# Patient Record
Sex: FEMALE | Race: BLACK OR AFRICAN AMERICAN | NOT HISPANIC OR LATINO | Employment: UNEMPLOYED | URBAN - METROPOLITAN AREA
[De-identification: names, ages, dates, MRNs, and addresses within clinical notes are randomized per-mention and may not be internally consistent; named-entity substitution may affect disease eponyms.]

---

## 2018-10-14 ENCOUNTER — HOSPITAL ENCOUNTER (EMERGENCY)
Facility: OTHER | Age: 38
Discharge: LEFT BEFORE DISCHARGE | End: 2018-10-14
Attending: EMERGENCY MEDICINE
Payer: MEDICAID

## 2018-10-14 VITALS
DIASTOLIC BLOOD PRESSURE: 71 MMHG | TEMPERATURE: 99 F | HEIGHT: 65 IN | SYSTOLIC BLOOD PRESSURE: 100 MMHG | RESPIRATION RATE: 18 BRPM | HEART RATE: 108 BPM | OXYGEN SATURATION: 99 % | WEIGHT: 168 LBS | BODY MASS INDEX: 27.99 KG/M2

## 2018-10-14 DIAGNOSIS — K92.1 BLOODY STOOL: ICD-10-CM

## 2018-10-14 DIAGNOSIS — R07.9 CHEST PAIN: Primary | ICD-10-CM

## 2018-10-14 DIAGNOSIS — R06.02 SHORTNESS OF BREATH: ICD-10-CM

## 2018-10-14 LAB
B-HCG UR QL: NEGATIVE
CTP QC/QA: YES

## 2018-10-14 PROCEDURE — 93010 ELECTROCARDIOGRAM REPORT: CPT | Mod: ,,, | Performed by: INTERNAL MEDICINE

## 2018-10-14 PROCEDURE — 93005 ELECTROCARDIOGRAM TRACING: CPT

## 2018-10-14 PROCEDURE — 81025 URINE PREGNANCY TEST: CPT | Performed by: EMERGENCY MEDICINE

## 2018-10-14 PROCEDURE — 99283 EMERGENCY DEPT VISIT LOW MDM: CPT | Mod: 25

## 2018-10-14 NOTE — ED TRIAGE NOTES
"+ intermittent mid sternal reproducible chest pains described as "pressure" rated 5/10 on pain scale x three years. Pt states pain is radiating to left arm and under left breast. + increased OSb upon rest and exertion. + intermittent dizziness reported. Denies N/V/D, fever or chills. Denies abdominal pains at this time.   "

## 2018-10-14 NOTE — ED NOTES
"Two patient identifiers have been checked and are correct.      Appearance: Pt awake, alert & oriented to person, place & time. Pt in no acute distress at present time. Pt is clean and well groomed with clothes appropriately fastened.   Skin: Skin warm, dry & intact. Color consistent with ethnicity. Mucous membranes moist. No breakdown or brusing noted.   Musculoskeletal: Patient moving all extremities well, no obvious swelling or deformities noted.   Respiratory: Respirations spontaneous, even, and non-labored. Visible chest rise noted. Airway is open and patent. No accessory muscle use noted.   Neurologic: Sensation is intact. Speech is clear and appropriate. Eyes open spontaneously, behavior appropriate to situation, follows commands, facial expression symmetrical, bilateral hand grasp equal and even, purposeful motor response noted.  Cardiac: All peripheral pulses present. No Bilateral lower extremity edema. Cap refill is <3 seconds. + mid sternal reproducible chest pains described as "pressure" rated 6/10 on pains.   Abdomen: Abdomen soft, non-tender to palpation.   : Pt reports no dysuria or hematuria.           "
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
## 2018-10-14 NOTE — ED NOTES
GI cocktail tubed to pharmacy. Unable to return to [facility] internal bin.

## 2020-04-27 ENCOUNTER — HOSPITAL ENCOUNTER (EMERGENCY)
Facility: HOSPITAL | Age: 40
Discharge: HOME | End: 2020-04-27
Attending: INTERNAL MEDICINE
Payer: MEDICAID

## 2020-04-27 VITALS
WEIGHT: 170 LBS | SYSTOLIC BLOOD PRESSURE: 105 MMHG | HEIGHT: 65 IN | OXYGEN SATURATION: 99 % | DIASTOLIC BLOOD PRESSURE: 69 MMHG | TEMPERATURE: 99 F | HEART RATE: 89 BPM | RESPIRATION RATE: 18 BRPM | BODY MASS INDEX: 28.32 KG/M2

## 2020-04-27 DIAGNOSIS — O26.892 ABDOMINAL PAIN DURING PREGNANCY IN SECOND TRIMESTER: Primary | ICD-10-CM

## 2020-04-27 DIAGNOSIS — R10.9 ABDOMINAL PAIN DURING PREGNANCY IN SECOND TRIMESTER: Primary | ICD-10-CM

## 2020-04-27 DIAGNOSIS — Z3A.11 PREGNANCY WITH 11 COMPLETED WEEKS GESTATION: ICD-10-CM

## 2020-04-27 LAB
ALBUMIN SERPL-MCNC: 2.6 G/DL (ref 3.3–5.5)
ALP SERPL-CCNC: 140 U/L (ref 42–141)
B-HCG UR QL: POSITIVE
B-HCG UR QL: POSITIVE
BILIRUB SERPL-MCNC: 0.6 MG/DL (ref 0.2–1.6)
BILIRUBIN, POC UA: NEGATIVE
BLOOD, POC UA: NEGATIVE
BUN SERPL-MCNC: 9 MG/DL (ref 7–22)
CALCIUM SERPL-MCNC: 8.7 MG/DL (ref 8–10.3)
CHLORIDE SERPL-SCNC: 109 MMOL/L (ref 98–108)
CLARITY, POC UA: ABNORMAL
COLOR, POC UA: YELLOW
CREAT SERPL-MCNC: 0.8 MG/DL (ref 0.6–1.2)
CTP QC/QA: YES
CTP QC/QA: YES
GLUCOSE SERPL-MCNC: 90 MG/DL (ref 73–118)
GLUCOSE, POC UA: NEGATIVE
KETONES, POC UA: NEGATIVE
LEUKOCYTE EST, POC UA: NEGATIVE
NITRITE, POC UA: NEGATIVE
PH UR STRIP: 8.5 [PH]
POC ALT (SGPT): 130 U/L (ref 10–47)
POC AST (SGOT): 76 U/L (ref 11–38)
POC BETA-HCG (QUANT): >2000 IU/L
POC PTINR: 0.9 (ref 0.9–1.2)
POC PTWBT: 11.3 SEC (ref 9.7–14.3)
POC TCO2: 26 MMOL/L (ref 18–33)
POTASSIUM BLD-SCNC: 4 MMOL/L (ref 3.6–5.1)
PROTEIN, POC UA: NEGATIVE
PROTEIN, POC: 6.7 G/DL (ref 6.4–8.1)
SAMPLE: NORMAL
SAMPLE: NORMAL
SODIUM BLD-SCNC: 142 MMOL/L (ref 128–145)
SPECIFIC GRAVITY, POC UA: 1.02
UROBILINOGEN, POC UA: 2 E.U./DL

## 2020-04-27 PROCEDURE — 87491 CHLMYD TRACH DNA AMP PROBE: CPT

## 2020-04-27 PROCEDURE — 99284 EMERGENCY DEPT VISIT MOD MDM: CPT | Mod: 25,ER

## 2020-04-27 PROCEDURE — 85025 COMPLETE CBC W/AUTO DIFF WBC: CPT | Mod: ER

## 2020-04-27 PROCEDURE — 63600175 PHARM REV CODE 636 W HCPCS: Mod: ER | Performed by: NURSE PRACTITIONER

## 2020-04-27 PROCEDURE — 85610 PROTHROMBIN TIME: CPT | Mod: ER

## 2020-04-27 PROCEDURE — 96372 THER/PROPH/DIAG INJ SC/IM: CPT | Mod: ER

## 2020-04-27 PROCEDURE — 80053 COMPREHEN METABOLIC PANEL: CPT | Mod: ER

## 2020-04-27 PROCEDURE — 81025 URINE PREGNANCY TEST: CPT | Mod: ER | Performed by: EMERGENCY MEDICINE

## 2020-04-27 PROCEDURE — 81025 URINE PREGNANCY TEST: CPT | Mod: ER | Performed by: INTERNAL MEDICINE

## 2020-04-27 PROCEDURE — 81003 URINALYSIS AUTO W/O SCOPE: CPT | Mod: ER

## 2020-04-27 PROCEDURE — 63700000 PHARM REV CODE 250 ALT 637 W/O HCPCS: Mod: ER | Performed by: NURSE PRACTITIONER

## 2020-04-27 PROCEDURE — 84702 CHORIONIC GONADOTROPIN TEST: CPT | Mod: ER

## 2020-04-27 RX ORDER — AZITHROMYCIN 250 MG/1
1000 TABLET, FILM COATED ORAL
Status: COMPLETED | OUTPATIENT
Start: 2020-04-27 | End: 2020-04-27

## 2020-04-27 RX ORDER — CEFTRIAXONE 250 MG/1
250 INJECTION, POWDER, FOR SOLUTION INTRAMUSCULAR; INTRAVENOUS
Status: COMPLETED | OUTPATIENT
Start: 2020-04-27 | End: 2020-04-27

## 2020-04-27 RX ADMIN — AZITHROMYCIN MONOHYDRATE 1000 MG: 250 TABLET ORAL at 04:04

## 2020-04-27 RX ADMIN — CEFTRIAXONE SODIUM 250 MG: 250 INJECTION, POWDER, FOR SOLUTION INTRAMUSCULAR; INTRAVENOUS at 04:04
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
## 2020-04-27 NOTE — ED NOTES
Patient tolerated ceftriaxone injection well. After 15 minutes of observation patient was discharged.
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
## 2020-04-28 LAB
C TRACH DNA SPEC QL NAA+PROBE: NOT DETECTED
N GONORRHOEA DNA SPEC QL NAA+PROBE: NOT DETECTED

## 2020-05-08 ENCOUNTER — TELEPHONE (OUTPATIENT)
Dept: OBSTETRICS AND GYNECOLOGY | Facility: CLINIC | Age: 40
End: 2020-05-08

## 2020-05-08 DIAGNOSIS — Z36.3 ENCOUNTER FOR ANTENATAL SCREENING FOR MALFORMATION USING ULTRASOUND: Primary | ICD-10-CM

## 2020-06-11 ENCOUNTER — HOSPITAL ENCOUNTER (EMERGENCY)
Facility: HOSPITAL | Age: 40
Discharge: HOME | End: 2020-06-11
Attending: INTERNAL MEDICINE
Payer: MEDICAID

## 2020-06-11 VITALS
TEMPERATURE: 98 F | OXYGEN SATURATION: 100 % | SYSTOLIC BLOOD PRESSURE: 117 MMHG | DIASTOLIC BLOOD PRESSURE: 72 MMHG | BODY MASS INDEX: 31.65 KG/M2 | WEIGHT: 190 LBS | RESPIRATION RATE: 16 BRPM | HEART RATE: 87 BPM | HEIGHT: 65 IN

## 2020-06-11 DIAGNOSIS — Z34.90 PREGNANCY, UNSPECIFIED GESTATIONAL AGE: ICD-10-CM

## 2020-06-11 DIAGNOSIS — R60.0 BILATERAL LOWER EXTREMITY EDEMA: Primary | ICD-10-CM

## 2020-06-11 PROCEDURE — 99282 EMERGENCY DEPT VISIT SF MDM: CPT | Mod: ER
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
## 2020-06-23 ENCOUNTER — HOSPITAL ENCOUNTER (EMERGENCY)
Facility: OTHER | Age: 40
Discharge: HOME | End: 2020-06-23
Attending: EMERGENCY MEDICINE
Payer: MEDICAID

## 2020-06-23 VITALS
SYSTOLIC BLOOD PRESSURE: 130 MMHG | BODY MASS INDEX: 32.49 KG/M2 | WEIGHT: 195 LBS | DIASTOLIC BLOOD PRESSURE: 73 MMHG | HEIGHT: 65 IN | HEART RATE: 62 BPM | TEMPERATURE: 99 F | OXYGEN SATURATION: 98 % | RESPIRATION RATE: 15 BRPM

## 2020-06-23 DIAGNOSIS — R60.9 SWELLING: ICD-10-CM

## 2020-06-23 DIAGNOSIS — R53.1 WEAKNESS: ICD-10-CM

## 2020-06-23 DIAGNOSIS — R60.0 PERIPHERAL EDEMA: Primary | ICD-10-CM

## 2020-06-23 LAB
ALBUMIN SERPL BCP-MCNC: 3.1 G/DL (ref 3.5–5.2)
ALP SERPL-CCNC: 117 U/L (ref 55–135)
ALT SERPL W/O P-5'-P-CCNC: 19 U/L (ref 10–44)
ANION GAP SERPL CALC-SCNC: 10 MMOL/L (ref 8–16)
ANISOCYTOSIS BLD QL SMEAR: SLIGHT
AST SERPL-CCNC: 36 U/L (ref 10–40)
B-HCG UR QL: NEGATIVE
BASOPHILS # BLD AUTO: ABNORMAL K/UL (ref 0–0.2)
BASOPHILS NFR BLD: 0 % (ref 0–1.9)
BILIRUB SERPL-MCNC: 0.2 MG/DL (ref 0.1–1)
BUN SERPL-MCNC: 9 MG/DL (ref 6–20)
CALCIUM SERPL-MCNC: 9.2 MG/DL (ref 8.7–10.5)
CHLORIDE SERPL-SCNC: 108 MMOL/L (ref 95–110)
CO2 SERPL-SCNC: 22 MMOL/L (ref 23–29)
CREAT SERPL-MCNC: 0.9 MG/DL (ref 0.5–1.4)
CTP QC/QA: YES
DIFFERENTIAL METHOD: ABNORMAL
EOSINOPHIL # BLD AUTO: ABNORMAL K/UL (ref 0–0.5)
EOSINOPHIL NFR BLD: 6 % (ref 0–8)
ERYTHROCYTE [DISTWIDTH] IN BLOOD BY AUTOMATED COUNT: 14.6 % (ref 11.5–14.5)
EST. GFR  (AFRICAN AMERICAN): >60 ML/MIN/1.73 M^2
EST. GFR  (NON AFRICAN AMERICAN): >60 ML/MIN/1.73 M^2
GLUCOSE SERPL-MCNC: 95 MG/DL (ref 70–110)
HCT VFR BLD AUTO: 33.3 % (ref 37–48.5)
HGB BLD-MCNC: 10.5 G/DL (ref 12–16)
HYPOCHROMIA BLD QL SMEAR: ABNORMAL
IMM GRANULOCYTES # BLD AUTO: ABNORMAL K/UL (ref 0–0.04)
IMM GRANULOCYTES NFR BLD AUTO: ABNORMAL % (ref 0–0.5)
LYMPHOCYTES # BLD AUTO: ABNORMAL K/UL (ref 1–4.8)
LYMPHOCYTES NFR BLD: 44 % (ref 18–48)
MCH RBC QN AUTO: 27.9 PG (ref 27–31)
MCHC RBC AUTO-ENTMCNC: 31.5 G/DL (ref 32–36)
MCV RBC AUTO: 89 FL (ref 82–98)
MONOCYTES # BLD AUTO: ABNORMAL K/UL (ref 0.3–1)
MONOCYTES NFR BLD: 5 % (ref 4–15)
NEUTROPHILS NFR BLD: 45 % (ref 38–73)
NRBC BLD-RTO: 0 /100 WBC
PLATELET # BLD AUTO: 422 K/UL (ref 150–350)
PLATELET BLD QL SMEAR: ABNORMAL
PMV BLD AUTO: 8.7 FL (ref 9.2–12.9)
POLYCHROMASIA BLD QL SMEAR: ABNORMAL
POTASSIUM SERPL-SCNC: 4.3 MMOL/L (ref 3.5–5.1)
PROT SERPL-MCNC: 8.4 G/DL (ref 6–8.4)
RBC # BLD AUTO: 3.76 M/UL (ref 4–5.4)
SODIUM SERPL-SCNC: 140 MMOL/L (ref 136–145)
WBC # BLD AUTO: 6.74 K/UL (ref 3.9–12.7)

## 2020-06-23 PROCEDURE — 85027 COMPLETE CBC AUTOMATED: CPT

## 2020-06-23 PROCEDURE — 93010 EKG 12-LEAD: ICD-10-PCS | Mod: ,,, | Performed by: INTERNAL MEDICINE

## 2020-06-23 PROCEDURE — 93005 ELECTROCARDIOGRAM TRACING: CPT

## 2020-06-23 PROCEDURE — 93010 ELECTROCARDIOGRAM REPORT: CPT | Mod: ,,, | Performed by: INTERNAL MEDICINE

## 2020-06-23 PROCEDURE — 99285 EMERGENCY DEPT VISIT HI MDM: CPT | Mod: 25

## 2020-06-23 PROCEDURE — 80053 COMPREHEN METABOLIC PANEL: CPT

## 2020-06-23 PROCEDURE — 81025 URINE PREGNANCY TEST: CPT | Performed by: EMERGENCY MEDICINE

## 2020-06-23 PROCEDURE — 85007 BL SMEAR W/DIFF WBC COUNT: CPT
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
## 2020-06-24 NOTE — ED TRIAGE NOTES
"Pt reports to ED with c/o BLE swelling since Thursday, worsening in the last 2 days. Pt reports darker color change in the past 2 days. Pt states ambulation is painful and her feet are "tight." Pt rates pain 10/10.     Pt currently taking methadone for heroin detox. Pt began treatment May 26th. Pt reports last heroin use today, PTA.   "

## 2021-04-26 ENCOUNTER — PATIENT MESSAGE (OUTPATIENT)
Dept: RESEARCH | Facility: HOSPITAL | Age: 41
End: 2021-04-26

## 2021-08-15 ENCOUNTER — HOSPITAL ENCOUNTER (EMERGENCY)
Facility: OTHER | Age: 41
Discharge: LEFT BEFORE DISCHARGE | End: 2021-08-15
Payer: MEDICAID

## 2021-08-15 VITALS
OXYGEN SATURATION: 97 % | HEART RATE: 110 BPM | DIASTOLIC BLOOD PRESSURE: 94 MMHG | SYSTOLIC BLOOD PRESSURE: 123 MMHG | RESPIRATION RATE: 18 BRPM | TEMPERATURE: 98 F

## 2021-08-15 LAB
CTP QC/QA: YES
SARS-COV-2 RDRP RESP QL NAA+PROBE: NEGATIVE

## 2021-08-15 PROCEDURE — 99900041 HC LEFT WITHOUT BEING SEEN- EMERGENCY

## 2021-08-15 PROCEDURE — U0002 COVID-19 LAB TEST NON-CDC: HCPCS | Performed by: EMERGENCY MEDICINE

## 2022-09-06 ENCOUNTER — PATIENT MESSAGE (OUTPATIENT)
Dept: OBSTETRICS AND GYNECOLOGY | Facility: CLINIC | Age: 42
End: 2022-09-06
Payer: MEDICAID

## 2024-08-22 ENCOUNTER — HOSPITAL ENCOUNTER (OUTPATIENT)
Facility: OTHER | Age: 44
Discharge: HOME | End: 2024-08-23
Attending: EMERGENCY MEDICINE | Admitting: EMERGENCY MEDICINE
Payer: MEDICAID

## 2024-08-22 DIAGNOSIS — T50.905A ADVERSE DRUG INTERACTION WITH PRESCRIPTION MEDICATION: ICD-10-CM

## 2024-08-22 DIAGNOSIS — R00.2 PALPITATIONS: ICD-10-CM

## 2024-08-22 DIAGNOSIS — R11.2 INTRACTABLE NAUSEA AND VOMITING: Primary | ICD-10-CM

## 2024-08-22 LAB
ALBUMIN SERPL BCP-MCNC: 3.5 G/DL (ref 3.5–5.2)
ALP SERPL-CCNC: 108 U/L (ref 55–135)
ALT SERPL W/O P-5'-P-CCNC: 16 U/L (ref 10–44)
ANION GAP SERPL CALC-SCNC: 12 MMOL/L (ref 8–16)
AST SERPL-CCNC: 27 U/L (ref 10–40)
BACTERIA #/AREA URNS HPF: ABNORMAL /HPF
BASOPHILS # BLD AUTO: 0.04 K/UL (ref 0–0.2)
BASOPHILS NFR BLD: 0.4 % (ref 0–1.9)
BILIRUB SERPL-MCNC: 0.7 MG/DL (ref 0.1–1)
BILIRUB UR QL STRIP: NEGATIVE
BUN SERPL-MCNC: 15 MG/DL (ref 6–20)
CALCIUM SERPL-MCNC: 9.5 MG/DL (ref 8.7–10.5)
CHLORIDE SERPL-SCNC: 104 MMOL/L (ref 95–110)
CLARITY UR: CLEAR
CO2 SERPL-SCNC: 22 MMOL/L (ref 23–29)
COLOR UR: YELLOW
CREAT SERPL-MCNC: 0.9 MG/DL (ref 0.5–1.4)
DIFFERENTIAL METHOD BLD: ABNORMAL
EOSINOPHIL # BLD AUTO: 0 K/UL (ref 0–0.5)
EOSINOPHIL NFR BLD: 0.2 % (ref 0–8)
ERYTHROCYTE [DISTWIDTH] IN BLOOD BY AUTOMATED COUNT: 14.3 % (ref 11.5–14.5)
EST. GFR  (NO RACE VARIABLE): >60 ML/MIN/1.73 M^2
GLUCOSE SERPL-MCNC: 98 MG/DL (ref 70–110)
GLUCOSE UR QL STRIP: NEGATIVE
HCT VFR BLD AUTO: 37.2 % (ref 37–48.5)
HGB BLD-MCNC: 11.9 G/DL (ref 12–16)
HGB UR QL STRIP: ABNORMAL
HYALINE CASTS #/AREA URNS LPF: 0 /LPF
IMM GRANULOCYTES # BLD AUTO: 0.02 K/UL (ref 0–0.04)
IMM GRANULOCYTES NFR BLD AUTO: 0.2 % (ref 0–0.5)
KETONES UR QL STRIP: ABNORMAL
LEUKOCYTE ESTERASE UR QL STRIP: NEGATIVE
LIPASE SERPL-CCNC: 11 U/L (ref 4–60)
LYMPHOCYTES # BLD AUTO: 1.7 K/UL (ref 1–4.8)
LYMPHOCYTES NFR BLD: 18.1 % (ref 18–48)
MCH RBC QN AUTO: 27.2 PG (ref 27–31)
MCHC RBC AUTO-ENTMCNC: 32 G/DL (ref 32–36)
MCV RBC AUTO: 85 FL (ref 82–98)
MICROSCOPIC COMMENT: ABNORMAL
MONOCYTES # BLD AUTO: 0.5 K/UL (ref 0.3–1)
MONOCYTES NFR BLD: 5.3 % (ref 4–15)
NEUTROPHILS # BLD AUTO: 7.1 K/UL (ref 1.8–7.7)
NEUTROPHILS NFR BLD: 75.8 % (ref 38–73)
NITRITE UR QL STRIP: NEGATIVE
NRBC BLD-RTO: 0 /100 WBC
PH UR STRIP: 6 [PH] (ref 5–8)
PLATELET # BLD AUTO: 349 K/UL (ref 150–450)
PMV BLD AUTO: 8.9 FL (ref 9.2–12.9)
POTASSIUM SERPL-SCNC: 4 MMOL/L (ref 3.5–5.1)
PROT SERPL-MCNC: 9.4 G/DL (ref 6–8.4)
PROT UR QL STRIP: ABNORMAL
RBC # BLD AUTO: 4.38 M/UL (ref 4–5.4)
RBC #/AREA URNS HPF: 6 /HPF (ref 0–4)
SODIUM SERPL-SCNC: 138 MMOL/L (ref 136–145)
SP GR UR STRIP: >1.03 (ref 1–1.03)
SQUAMOUS #/AREA URNS HPF: 4 /HPF
TROPONIN I SERPL DL<=0.01 NG/ML-MCNC: 0.01 NG/ML (ref 0–0.03)
URN SPEC COLLECT METH UR: ABNORMAL
UROBILINOGEN UR STRIP-ACNC: ABNORMAL EU/DL
WBC # BLD AUTO: 9.36 K/UL (ref 3.9–12.7)
WBC #/AREA URNS HPF: 3 /HPF (ref 0–5)

## 2024-08-22 PROCEDURE — 63600175 PHARM REV CODE 636 W HCPCS: Performed by: NURSE PRACTITIONER

## 2024-08-22 PROCEDURE — 80053 COMPREHEN METABOLIC PANEL: CPT | Performed by: NURSE PRACTITIONER

## 2024-08-22 PROCEDURE — 36415 COLL VENOUS BLD VENIPUNCTURE: CPT | Performed by: NURSE PRACTITIONER

## 2024-08-22 PROCEDURE — 93005 ELECTROCARDIOGRAM TRACING: CPT

## 2024-08-22 PROCEDURE — 84484 ASSAY OF TROPONIN QUANT: CPT | Performed by: NURSE PRACTITIONER

## 2024-08-22 PROCEDURE — 96374 THER/PROPH/DIAG INJ IV PUSH: CPT

## 2024-08-22 PROCEDURE — 81000 URINALYSIS NONAUTO W/SCOPE: CPT | Performed by: NURSE PRACTITIONER

## 2024-08-22 PROCEDURE — 96361 HYDRATE IV INFUSION ADD-ON: CPT

## 2024-08-22 PROCEDURE — 85025 COMPLETE CBC W/AUTO DIFF WBC: CPT | Performed by: NURSE PRACTITIONER

## 2024-08-22 PROCEDURE — 83690 ASSAY OF LIPASE: CPT | Performed by: NURSE PRACTITIONER

## 2024-08-22 PROCEDURE — 99285 EMERGENCY DEPT VISIT HI MDM: CPT | Mod: 25

## 2024-08-22 PROCEDURE — 93010 ELECTROCARDIOGRAM REPORT: CPT | Mod: ,,, | Performed by: INTERNAL MEDICINE

## 2024-08-22 PROCEDURE — 25000003 PHARM REV CODE 250: Performed by: NURSE PRACTITIONER

## 2024-08-22 PROCEDURE — 96376 TX/PRO/DX INJ SAME DRUG ADON: CPT

## 2024-08-22 PROCEDURE — G0378 HOSPITAL OBSERVATION PER HR: HCPCS

## 2024-08-22 PROCEDURE — 96375 TX/PRO/DX INJ NEW DRUG ADDON: CPT

## 2024-08-22 RX ORDER — ALUMINUM HYDROXIDE, MAGNESIUM HYDROXIDE, AND SIMETHICONE 1200; 120; 1200 MG/30ML; MG/30ML; MG/30ML
30 SUSPENSION ORAL ONCE
Status: DISCONTINUED | OUTPATIENT
Start: 2024-08-22 | End: 2024-08-23 | Stop reason: HOSPADM

## 2024-08-22 RX ORDER — METOCLOPRAMIDE HYDROCHLORIDE 5 MG/ML
10 INJECTION INTRAMUSCULAR; INTRAVENOUS EVERY 6 HOURS PRN
Status: DISCONTINUED | OUTPATIENT
Start: 2024-08-22 | End: 2024-08-23 | Stop reason: HOSPADM

## 2024-08-22 RX ORDER — LIDOCAINE HYDROCHLORIDE 20 MG/ML
15 SOLUTION OROPHARYNGEAL ONCE
Status: DISCONTINUED | OUTPATIENT
Start: 2024-08-22 | End: 2024-08-23 | Stop reason: HOSPADM

## 2024-08-22 RX ORDER — METOCLOPRAMIDE HYDROCHLORIDE 5 MG/ML
10 INJECTION INTRAMUSCULAR; INTRAVENOUS
Status: COMPLETED | OUTPATIENT
Start: 2024-08-22 | End: 2024-08-22

## 2024-08-22 RX ORDER — FAMOTIDINE 10 MG/ML
20 INJECTION INTRAVENOUS
Status: COMPLETED | OUTPATIENT
Start: 2024-08-22 | End: 2024-08-22

## 2024-08-22 RX ORDER — SODIUM CHLORIDE, SODIUM LACTATE, POTASSIUM CHLORIDE, CALCIUM CHLORIDE 600; 310; 30; 20 MG/100ML; MG/100ML; MG/100ML; MG/100ML
INJECTION, SOLUTION INTRAVENOUS CONTINUOUS
Status: DISCONTINUED | OUTPATIENT
Start: 2024-08-22 | End: 2024-08-23 | Stop reason: HOSPADM

## 2024-08-22 RX ORDER — LIDOCAINE HYDROCHLORIDE 20 MG/ML
15 SOLUTION OROPHARYNGEAL ONCE
Status: DISCONTINUED | OUTPATIENT
Start: 2024-08-22 | End: 2024-08-22

## 2024-08-22 RX ORDER — ALUMINUM HYDROXIDE, MAGNESIUM HYDROXIDE, AND SIMETHICONE 1200; 120; 1200 MG/30ML; MG/30ML; MG/30ML
30 SUSPENSION ORAL ONCE
Status: DISCONTINUED | OUTPATIENT
Start: 2024-08-22 | End: 2024-08-22

## 2024-08-22 RX ORDER — GABAPENTIN 400 MG/1
800 CAPSULE ORAL
Status: DISCONTINUED | OUTPATIENT
Start: 2024-08-22 | End: 2024-08-22

## 2024-08-22 RX ORDER — DROPERIDOL 2.5 MG/ML
0.62 INJECTION, SOLUTION INTRAMUSCULAR; INTRAVENOUS EVERY 6 HOURS PRN
Status: DISCONTINUED | OUTPATIENT
Start: 2024-08-22 | End: 2024-08-23 | Stop reason: HOSPADM

## 2024-08-22 RX ORDER — SODIUM CHLORIDE 0.9 % (FLUSH) 0.9 %
10 SYRINGE (ML) INJECTION
Status: DISCONTINUED | OUTPATIENT
Start: 2024-08-22 | End: 2024-08-23 | Stop reason: HOSPADM

## 2024-08-22 RX ORDER — FAMOTIDINE 10 MG/ML
20 INJECTION INTRAVENOUS DAILY
Status: DISCONTINUED | OUTPATIENT
Start: 2024-08-23 | End: 2024-08-23 | Stop reason: HOSPADM

## 2024-08-22 RX ORDER — TALC
6 POWDER (GRAM) TOPICAL NIGHTLY PRN
Status: DISCONTINUED | OUTPATIENT
Start: 2024-08-22 | End: 2024-08-23 | Stop reason: HOSPADM

## 2024-08-22 RX ORDER — GABAPENTIN 400 MG/1
800 CAPSULE ORAL
Status: COMPLETED | OUTPATIENT
Start: 2024-08-22 | End: 2024-08-22

## 2024-08-22 RX ORDER — GABAPENTIN 400 MG/1
400 CAPSULE ORAL
Status: COMPLETED | OUTPATIENT
Start: 2024-08-22 | End: 2024-08-22

## 2024-08-22 RX ADMIN — FAMOTIDINE 20 MG: 10 INJECTION, SOLUTION INTRAVENOUS at 09:08

## 2024-08-22 RX ADMIN — GABAPENTIN 800 MG: 400 CAPSULE ORAL at 08:08

## 2024-08-22 RX ADMIN — ALUMINUM HYDROXIDE, MAGNESIUM HYDROXIDE, AND SIMETHICONE 30 ML: 1200; 120; 1200 SUSPENSION ORAL at 09:08

## 2024-08-22 RX ADMIN — GABAPENTIN 400 MG: 400 CAPSULE ORAL at 11:08

## 2024-08-22 RX ADMIN — SODIUM CHLORIDE, POTASSIUM CHLORIDE, SODIUM LACTATE AND CALCIUM CHLORIDE: 600; 310; 30; 20 INJECTION, SOLUTION INTRAVENOUS at 10:08

## 2024-08-22 RX ADMIN — SODIUM CHLORIDE, POTASSIUM CHLORIDE, SODIUM LACTATE AND CALCIUM CHLORIDE 1000 ML: 600; 310; 30; 20 INJECTION, SOLUTION INTRAVENOUS at 07:08

## 2024-08-22 RX ADMIN — LIDOCAINE HYDROCHLORIDE 15 ML: 20 SOLUTION ORAL at 09:08

## 2024-08-22 RX ADMIN — METOCLOPRAMIDE 10 MG: 5 INJECTION, SOLUTION INTRAMUSCULAR; INTRAVENOUS at 10:08

## 2024-08-22 RX ADMIN — METOCLOPRAMIDE 10 MG: 5 INJECTION, SOLUTION INTRAMUSCULAR; INTRAVENOUS at 07:08

## 2024-08-22 NOTE — ED TRIAGE NOTES
"Leslei Gann, a 44 y.o. female presents to the ED w/ complaint of Emesis. Reports "taking a double dose of Ozempic at 5:00pm on 8/21/24. Constant n/v since taking the double dose." Endorses "chest cramping and palpitations"     Triage note:  Chief Complaint   Patient presents with    Emesis     Constant vomiting and abdominal pain with palpitations since taking double dose of ozempic yesterday     Review of patient's allergies indicates:  No Known Allergies  Past Medical History:   Diagnosis Date    Bilateral ovarian cysts     Helicobacter pylori (H. pylori)     Migraine headache     Polycystic ovary syndrome     Seizures         APPEARANCE: awake and alert in NAD.  SKIN: warm, dry and intact. No breakdown or bruising.  MUSCULOSKELETAL: Patient moving all extremities spontaneously, no obvious swelling or deformities noted. Ambulates independently.  RESPIRATORY: Denies shortness of breath.Respirations unlabored.   CARDIAC: Reports chest cramping/palpitations, 2+ distal pulses; no peripheral edema  ABDOMEN: S/ND/NT, Reports n/v, abd pain.   : voids spontaneously, denies difficulty  Neurologic: AAO x 4; follows commands equal strength in all extremities; denies numbness/tingling. Denies dizziness, MARCELA.    "

## 2024-08-23 VITALS
TEMPERATURE: 99 F | RESPIRATION RATE: 16 BRPM | OXYGEN SATURATION: 100 % | DIASTOLIC BLOOD PRESSURE: 84 MMHG | HEIGHT: 65 IN | HEART RATE: 80 BPM | BODY MASS INDEX: 41.65 KG/M2 | SYSTOLIC BLOOD PRESSURE: 145 MMHG | WEIGHT: 250 LBS

## 2024-08-23 PROBLEM — T50.905A MEDICATION ADVERSE EFFECT: Status: ACTIVE | Noted: 2024-08-23

## 2024-08-23 PROBLEM — R11.2 INTRACTABLE NAUSEA AND VOMITING: Status: ACTIVE | Noted: 2024-08-23

## 2024-08-23 LAB
ALBUMIN SERPL BCP-MCNC: 3.2 G/DL (ref 3.5–5.2)
ALP SERPL-CCNC: 97 U/L (ref 55–135)
ALT SERPL W/O P-5'-P-CCNC: 13 U/L (ref 10–44)
ANION GAP SERPL CALC-SCNC: 11 MMOL/L (ref 8–16)
AST SERPL-CCNC: 21 U/L (ref 10–40)
B-HCG UR QL: NEGATIVE
BASOPHILS # BLD AUTO: 0.04 K/UL (ref 0–0.2)
BASOPHILS NFR BLD: 0.5 % (ref 0–1.9)
BILIRUB SERPL-MCNC: 0.6 MG/DL (ref 0.1–1)
BUN SERPL-MCNC: 14 MG/DL (ref 6–20)
CALCIUM SERPL-MCNC: 9 MG/DL (ref 8.7–10.5)
CHLORIDE SERPL-SCNC: 107 MMOL/L (ref 95–110)
CO2 SERPL-SCNC: 20 MMOL/L (ref 23–29)
CREAT SERPL-MCNC: 0.8 MG/DL (ref 0.5–1.4)
CTP QC/QA: YES
DIFFERENTIAL METHOD BLD: ABNORMAL
EOSINOPHIL # BLD AUTO: 0 K/UL (ref 0–0.5)
EOSINOPHIL NFR BLD: 0.3 % (ref 0–8)
ERYTHROCYTE [DISTWIDTH] IN BLOOD BY AUTOMATED COUNT: 14.3 % (ref 11.5–14.5)
EST. GFR  (NO RACE VARIABLE): >60 ML/MIN/1.73 M^2
GLUCOSE SERPL-MCNC: 81 MG/DL (ref 70–110)
HCT VFR BLD AUTO: 34.9 % (ref 37–48.5)
HGB BLD-MCNC: 11.2 G/DL (ref 12–16)
IMM GRANULOCYTES # BLD AUTO: 0.02 K/UL (ref 0–0.04)
IMM GRANULOCYTES NFR BLD AUTO: 0.3 % (ref 0–0.5)
LYMPHOCYTES # BLD AUTO: 3.4 K/UL (ref 1–4.8)
LYMPHOCYTES NFR BLD: 42.7 % (ref 18–48)
MCH RBC QN AUTO: 27.3 PG (ref 27–31)
MCHC RBC AUTO-ENTMCNC: 32.1 G/DL (ref 32–36)
MCV RBC AUTO: 85 FL (ref 82–98)
MONOCYTES # BLD AUTO: 0.7 K/UL (ref 0.3–1)
MONOCYTES NFR BLD: 9.3 % (ref 4–15)
NEUTROPHILS # BLD AUTO: 3.8 K/UL (ref 1.8–7.7)
NEUTROPHILS NFR BLD: 46.9 % (ref 38–73)
NRBC BLD-RTO: 0 /100 WBC
OHS QRS DURATION: 88 MS
OHS QTC CALCULATION: 420 MS
PLATELET # BLD AUTO: 280 K/UL (ref 150–450)
PMV BLD AUTO: 9.1 FL (ref 9.2–12.9)
POTASSIUM SERPL-SCNC: 3.8 MMOL/L (ref 3.5–5.1)
PROT SERPL-MCNC: 8.2 G/DL (ref 6–8.4)
RBC # BLD AUTO: 4.11 M/UL (ref 4–5.4)
SODIUM SERPL-SCNC: 138 MMOL/L (ref 136–145)
WBC # BLD AUTO: 7.99 K/UL (ref 3.9–12.7)

## 2024-08-23 PROCEDURE — 85025 COMPLETE CBC W/AUTO DIFF WBC: CPT | Performed by: NURSE PRACTITIONER

## 2024-08-23 PROCEDURE — 25000003 PHARM REV CODE 250: Performed by: NURSE PRACTITIONER

## 2024-08-23 PROCEDURE — 96361 HYDRATE IV INFUSION ADD-ON: CPT

## 2024-08-23 PROCEDURE — G0378 HOSPITAL OBSERVATION PER HR: HCPCS

## 2024-08-23 PROCEDURE — 25000003 PHARM REV CODE 250: Performed by: EMERGENCY MEDICINE

## 2024-08-23 PROCEDURE — 81025 URINE PREGNANCY TEST: CPT | Performed by: NURSE PRACTITIONER

## 2024-08-23 PROCEDURE — 96376 TX/PRO/DX INJ SAME DRUG ADON: CPT

## 2024-08-23 PROCEDURE — 80053 COMPREHEN METABOLIC PANEL: CPT | Performed by: NURSE PRACTITIONER

## 2024-08-23 PROCEDURE — 25000003 PHARM REV CODE 250: Performed by: INTERNAL MEDICINE

## 2024-08-23 PROCEDURE — 36415 COLL VENOUS BLD VENIPUNCTURE: CPT | Performed by: NURSE PRACTITIONER

## 2024-08-23 RX ORDER — GABAPENTIN 400 MG/1
800 CAPSULE ORAL ONCE
Status: COMPLETED | OUTPATIENT
Start: 2024-08-23 | End: 2024-08-23

## 2024-08-23 RX ORDER — METOCLOPRAMIDE 10 MG/1
10 TABLET ORAL EVERY 6 HOURS
Qty: 30 TABLET | Refills: 0 | Status: SHIPPED | OUTPATIENT
Start: 2024-08-23

## 2024-08-23 RX ORDER — DICYCLOMINE HYDROCHLORIDE 20 MG/1
20 TABLET ORAL 2 TIMES DAILY
Qty: 20 TABLET | Refills: 0 | Status: SHIPPED | OUTPATIENT
Start: 2024-08-23 | End: 2024-09-22

## 2024-08-23 RX ORDER — FAMOTIDINE 20 MG/1
20 TABLET, FILM COATED ORAL 2 TIMES DAILY
COMMUNITY

## 2024-08-23 RX ORDER — GABAPENTIN 400 MG/1
400 CAPSULE ORAL
Status: COMPLETED | OUTPATIENT
Start: 2024-08-23 | End: 2024-08-23

## 2024-08-23 RX ORDER — GABAPENTIN 800 MG/1
800 TABLET ORAL 3 TIMES DAILY
COMMUNITY

## 2024-08-23 RX ADMIN — FAMOTIDINE 20 MG: 10 INJECTION, SOLUTION INTRAVENOUS at 10:08

## 2024-08-23 RX ADMIN — GABAPENTIN 400 MG: 400 CAPSULE ORAL at 06:08

## 2024-08-23 RX ADMIN — GABAPENTIN 800 MG: 400 CAPSULE ORAL at 10:08
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
## 2024-08-23 NOTE — ED NOTES
"Pt states 'I want to be off the fluids, so I can get some rest, I had some downstairs my stomach feels full." Educated pt on the need for the ongoing fluids in regards to her symptoms, pt verbalizes "I will be fine." No other complaints at this time.   "
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
## 2024-08-23 NOTE — ED NOTES
"Pt c/o pain being a 7/10. States "my back and legs are in pain, I take gabapentin every 4-6 hours, 800mg." Md made aware.   "

## 2024-08-23 NOTE — ED NOTES
Pt educated on why IV fluids are necessary. Pt refusing IV hydration. Pt tolerating oral fluids and prefers to hydrate orally. No complaints of nausea or vomiting at this time.

## 2024-08-23 NOTE — ED NOTES
Patient actively vomiting, emesis bag provided. Provider notified, no new orders at this time. Will continue to monitor.

## 2024-08-23 NOTE — ED NOTES
Pt arrives to unit via wheelchair with PCT Ben, aixa, resp even and unlabored, nad noted, personal belongings at bedside. Informed pt of ongoing care, hospital policy and procedure. Instructed pt to use call light for all questions and concerns, pt has no complaints at this time. Pt asks about medication, informed pt will continue with medication administration, once symptoms have subsided.  Pt verbalizes understanding.

## 2024-08-23 NOTE — DISCHARGE SUMMARY
ED Observation Unit  Discharge Summary        History of Present Illness:    Leslie Gann is a 44 y.o. female presenting to the emergency department reporting intractable nausea vomiting with upper abdominal pain after she took 2 mg Ozempic injection yesterday.  States she was never use this medication before so she took a dose of her mother's.  Reports she has been unable to tolerate any oral intake since.  Past medical history includes H pylori, migraines, seizures and PCOS.     Observation Course:    IV fluids, antiemetics, PO challenge- pt feeling andrzej and no longer has nausea    Consultants:    N/a    Final Diagnosis:  Generalized abdominal pain, medication adverse affect    Discharge Condition: Good    Disposition: Home or Self Care     Time spent on the discharge of the patient including review of hospital course with the patient. reviewing discharge medications and arranging follow-up care 35 minutes.  Patient was seen and examined on the date of discharge and determined to be suitable for discharge.    Follow Up:  No future appointments.  Follow up with PCP

## 2024-08-23 NOTE — ED NOTES
Patient reports left sided chest pain that radiates to left side, frontal headache 10/10, and mild relief of nausea. Provider notified.
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
## 2025-05-17 ENCOUNTER — HOSPITAL ENCOUNTER (EMERGENCY)
Facility: OTHER | Age: 45
Discharge: HOME | End: 2025-05-18
Attending: EMERGENCY MEDICINE
Payer: MEDICAID

## 2025-05-17 DIAGNOSIS — B02.9 HERPES ZOSTER WITHOUT COMPLICATION: Primary | ICD-10-CM

## 2025-05-17 PROCEDURE — 99284 EMERGENCY DEPT VISIT MOD MDM: CPT

## 2025-05-18 VITALS
SYSTOLIC BLOOD PRESSURE: 150 MMHG | HEIGHT: 65 IN | BODY MASS INDEX: 35.82 KG/M2 | WEIGHT: 215 LBS | RESPIRATION RATE: 20 BRPM | HEART RATE: 110 BPM | DIASTOLIC BLOOD PRESSURE: 90 MMHG | TEMPERATURE: 99 F | OXYGEN SATURATION: 100 %

## 2025-05-18 PROCEDURE — 25000003 PHARM REV CODE 250: Performed by: EMERGENCY MEDICINE

## 2025-05-18 PROCEDURE — 63600175 PHARM REV CODE 636 W HCPCS: Performed by: EMERGENCY MEDICINE

## 2025-05-18 RX ORDER — IBUPROFEN 600 MG/1
600 TABLET, FILM COATED ORAL
Status: COMPLETED | OUTPATIENT
Start: 2025-05-18 | End: 2025-05-18

## 2025-05-18 RX ORDER — IBUPROFEN 600 MG/1
600 TABLET, FILM COATED ORAL EVERY 6 HOURS PRN
Qty: 20 TABLET | Refills: 0 | Status: SHIPPED | OUTPATIENT
Start: 2025-05-18

## 2025-05-18 RX ORDER — HYDROCODONE BITARTRATE AND ACETAMINOPHEN 5; 325 MG/1; MG/1
1 TABLET ORAL EVERY 4 HOURS PRN
Qty: 8 TABLET | Refills: 0 | Status: SHIPPED | OUTPATIENT
Start: 2025-05-18 | End: 2025-05-21

## 2025-05-18 RX ORDER — PREDNISONE 20 MG/1
60 TABLET ORAL DAILY
Qty: 12 TABLET | Refills: 0 | Status: SHIPPED | OUTPATIENT
Start: 2025-05-18 | End: 2025-05-22

## 2025-05-18 RX ORDER — PREDNISONE 20 MG/1
60 TABLET ORAL
Status: COMPLETED | OUTPATIENT
Start: 2025-05-18 | End: 2025-05-18

## 2025-05-18 RX ADMIN — PREDNISONE 60 MG: 20 TABLET ORAL at 12:05

## 2025-05-18 RX ADMIN — IBUPROFEN 600 MG: 600 TABLET, FILM COATED ORAL at 12:05
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
## 2025-05-18 NOTE — ED TRIAGE NOTES
Leslie Gann, an 45 y.o. female presents to the ED via POV  [id number] p/w/d ambulated into the ED at her own accord without issue  C/O a localized rash located underneath the L breast X1 week  Topical ointment utilized but still no relief           (-)cp, sob, abd pain, dizziness, weakness or NVD  (-)neuro deficits

## 2025-05-19 ENCOUNTER — TELEPHONE (OUTPATIENT)
Facility: OTHER | Age: 45
End: 2025-05-19
Payer: MEDICAID

## 2025-05-20 NOTE — PROGRESS NOTES
Patient seen in the ED on 5/17/2025 for shingles.  Patient's call escalated to post ED text tracker.  Patient outreached on two separate occasions but unable to reach.  Encounter closed at this time.

## 2025-05-21 ENCOUNTER — HOSPITAL ENCOUNTER (EMERGENCY)
Facility: OTHER | Age: 45
Discharge: HOME | End: 2025-05-21
Attending: EMERGENCY MEDICINE
Payer: MEDICAID

## 2025-05-21 VITALS
WEIGHT: 210 LBS | HEART RATE: 65 BPM | OXYGEN SATURATION: 100 % | HEIGHT: 65 IN | BODY MASS INDEX: 34.99 KG/M2 | SYSTOLIC BLOOD PRESSURE: 121 MMHG | DIASTOLIC BLOOD PRESSURE: 70 MMHG | RESPIRATION RATE: 18 BRPM | TEMPERATURE: 99 F

## 2025-05-21 DIAGNOSIS — B02.9 HERPES ZOSTER WITHOUT COMPLICATION: Primary | ICD-10-CM

## 2025-05-21 DIAGNOSIS — R07.9 CHEST PAIN: ICD-10-CM

## 2025-05-21 LAB
B-HCG UR QL: NEGATIVE
CTP QC/QA: YES
OHS QRS DURATION: 80 MS
OHS QTC CALCULATION: 418 MS

## 2025-05-21 PROCEDURE — 99284 EMERGENCY DEPT VISIT MOD MDM: CPT | Mod: 25

## 2025-05-21 PROCEDURE — 25000003 PHARM REV CODE 250: Performed by: EMERGENCY MEDICINE

## 2025-05-21 PROCEDURE — 93005 ELECTROCARDIOGRAM TRACING: CPT

## 2025-05-21 PROCEDURE — 63600175 PHARM REV CODE 636 W HCPCS: Performed by: EMERGENCY MEDICINE

## 2025-05-21 PROCEDURE — 96372 THER/PROPH/DIAG INJ SC/IM: CPT | Performed by: EMERGENCY MEDICINE

## 2025-05-21 PROCEDURE — 93010 ELECTROCARDIOGRAM REPORT: CPT | Mod: ,,, | Performed by: INTERNAL MEDICINE

## 2025-05-21 PROCEDURE — 81025 URINE PREGNANCY TEST: CPT | Performed by: EMERGENCY MEDICINE

## 2025-05-21 RX ORDER — GABAPENTIN 300 MG/1
300 CAPSULE ORAL ONCE
Status: DISCONTINUED | OUTPATIENT
Start: 2025-05-21 | End: 2025-05-21 | Stop reason: HOSPADM

## 2025-05-21 RX ORDER — LIDOCAINE AND PRILOCAINE 25; 25 MG/G; MG/G
CREAM TOPICAL
Qty: 30 G | Refills: 0 | Status: SHIPPED | OUTPATIENT
Start: 2025-05-21

## 2025-05-21 RX ORDER — VALACYCLOVIR HYDROCHLORIDE 500 MG/1
1000 TABLET, FILM COATED ORAL ONCE
Status: COMPLETED | OUTPATIENT
Start: 2025-05-21 | End: 2025-05-21

## 2025-05-21 RX ORDER — LIDOCAINE AND PRILOCAINE 25; 25 MG/G; MG/G
CREAM TOPICAL
Status: COMPLETED | OUTPATIENT
Start: 2025-05-21 | End: 2025-05-21

## 2025-05-21 RX ORDER — HYDROCODONE BITARTRATE AND ACETAMINOPHEN 10; 325 MG/1; MG/1
1 TABLET ORAL EVERY 6 HOURS PRN
Qty: 12 TABLET | Refills: 0 | Status: SHIPPED | OUTPATIENT
Start: 2025-05-21

## 2025-05-21 RX ORDER — VALACYCLOVIR HYDROCHLORIDE 1 G/1
1000 TABLET, FILM COATED ORAL 3 TIMES DAILY
Qty: 21 TABLET | Refills: 0 | Status: SHIPPED | OUTPATIENT
Start: 2025-05-21 | End: 2025-05-28

## 2025-05-21 RX ORDER — HYDROCODONE BITARTRATE AND ACETAMINOPHEN 10; 325 MG/1; MG/1
1 TABLET ORAL
Refills: 0 | Status: COMPLETED | OUTPATIENT
Start: 2025-05-21 | End: 2025-05-21

## 2025-05-21 RX ORDER — GABAPENTIN 300 MG/1
300 CAPSULE ORAL ONCE
Status: DISCONTINUED | OUTPATIENT
Start: 2025-05-21 | End: 2025-05-21

## 2025-05-21 RX ORDER — VALACYCLOVIR HYDROCHLORIDE 500 MG/1
1000 TABLET, FILM COATED ORAL ONCE
Status: DISCONTINUED | OUTPATIENT
Start: 2025-05-21 | End: 2025-05-21

## 2025-05-21 RX ORDER — HYDROMORPHONE HYDROCHLORIDE 1 MG/ML
1 INJECTION, SOLUTION INTRAMUSCULAR; INTRAVENOUS; SUBCUTANEOUS
Refills: 0 | Status: COMPLETED | OUTPATIENT
Start: 2025-05-21 | End: 2025-05-21

## 2025-05-21 RX ORDER — LIDOCAINE 50 MG/G
1 PATCH TOPICAL
Status: DISCONTINUED | OUTPATIENT
Start: 2025-05-21 | End: 2025-05-21

## 2025-05-21 RX ADMIN — VALACYCLOVIR HYDROCHLORIDE 1000 MG: 500 TABLET, FILM COATED ORAL at 09:05

## 2025-05-21 RX ADMIN — LIDOCAINE AND PRILOCAINE: 25; 25 CREAM TOPICAL at 08:05

## 2025-05-21 RX ADMIN — HYDROCODONE BITARTRATE AND ACETAMINOPHEN 1 TABLET: 10; 325 TABLET ORAL at 10:05

## 2025-05-21 RX ADMIN — HYDROMORPHONE HYDROCHLORIDE 1 MG: 1 INJECTION, SOLUTION INTRAMUSCULAR; INTRAVENOUS; SUBCUTANEOUS at 07:05
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
## 2025-06-13 ENCOUNTER — HOSPITAL ENCOUNTER (INPATIENT)
Facility: OTHER | Age: 45
LOS: 6 days | Discharge: HOME | DRG: 872 | End: 2025-06-20
Admitting: HOSPITALIST
Payer: COMMERCIAL

## 2025-06-13 DIAGNOSIS — L02.415 ABSCESS OF RIGHT LOWER EXTREMITY: ICD-10-CM

## 2025-06-13 DIAGNOSIS — M00.062 STAPHYLOCOCCAL ARTHRITIS OF LEFT KNEE: ICD-10-CM

## 2025-06-13 DIAGNOSIS — A41.9 SEPSIS DUE TO CELLULITIS: ICD-10-CM

## 2025-06-13 DIAGNOSIS — R78.81 BACTEREMIA DUE TO METHICILLIN RESISTANT STAPHYLOCOCCUS AUREUS: Primary | ICD-10-CM

## 2025-06-13 DIAGNOSIS — L03.90 SEPSIS DUE TO CELLULITIS: ICD-10-CM

## 2025-06-13 DIAGNOSIS — L03.116 CELLULITIS OF LEFT LOWER EXTREMITY: ICD-10-CM

## 2025-06-13 DIAGNOSIS — B95.62 BACTEREMIA DUE TO METHICILLIN RESISTANT STAPHYLOCOCCUS AUREUS: Primary | ICD-10-CM

## 2025-06-13 DIAGNOSIS — A41.9 SEPSIS, DUE TO UNSPECIFIED ORGANISM, UNSPECIFIED WHETHER ACUTE ORGAN DYSFUNCTION PRESENT: ICD-10-CM

## 2025-06-13 DIAGNOSIS — Z13.6 SCREENING FOR CARDIOVASCULAR CONDITION: ICD-10-CM

## 2025-06-13 DIAGNOSIS — A41.01: ICD-10-CM

## 2025-06-13 DIAGNOSIS — R65.20: ICD-10-CM

## 2025-06-13 DIAGNOSIS — R78.81 BACTEREMIA: ICD-10-CM

## 2025-06-13 DIAGNOSIS — M00.9 PYOGENIC ARTHRITIS OF RIGHT KNEE JOINT, DUE TO UNSPECIFIED ORGANISM: ICD-10-CM

## 2025-06-13 LAB
ABSOLUTE EOSINOPHIL (OHS): 0.02 K/UL
ABSOLUTE MONOCYTE (OHS): 0.78 K/UL (ref 0.3–1)
ABSOLUTE NEUTROPHIL COUNT (OHS): 4.9 K/UL (ref 1.8–7.7)
ALBUMIN SERPL BCP-MCNC: 3 G/DL (ref 3.5–5.2)
ALP SERPL-CCNC: 157 UNIT/L (ref 40–150)
ALT SERPL W/O P-5'-P-CCNC: 21 UNIT/L (ref 10–44)
AMPHET UR QL SCN: NEGATIVE
ANION GAP (OHS): 14 MMOL/L (ref 8–16)
AST SERPL-CCNC: 28 UNIT/L (ref 11–45)
B-HCG UR QL: NEGATIVE
BARBITURATE SCN PRESENT UR: NEGATIVE
BASOPHILS # BLD AUTO: 0.04 K/UL
BASOPHILS NFR BLD AUTO: 0.5 %
BENZODIAZ UR QL SCN: NEGATIVE
BILIRUB SERPL-MCNC: 0.3 MG/DL (ref 0.1–1)
BILIRUB UR QL STRIP.AUTO: NEGATIVE
BUN SERPL-MCNC: 9 MG/DL (ref 6–20)
CALCIUM SERPL-MCNC: 9 MG/DL (ref 8.7–10.5)
CANNABINOIDS UR QL SCN: ABNORMAL
CHLORIDE SERPL-SCNC: 105 MMOL/L (ref 95–110)
CLARITY UR: CLEAR
CO2 SERPL-SCNC: 17 MMOL/L (ref 23–29)
COCAINE UR QL SCN: ABNORMAL
COLOR UR AUTO: YELLOW
CREAT SERPL-MCNC: 0.8 MG/DL (ref 0.5–1.4)
CREAT UR-MCNC: 67.4 MG/DL (ref 15–325)
CRP SERPL-MCNC: 131.1 MG/L
CTP QC/QA: YES
ERYTHROCYTE [DISTWIDTH] IN BLOOD BY AUTOMATED COUNT: 15.5 % (ref 11.5–14.5)
GFR SERPLBLD CREATININE-BSD FMLA CKD-EPI: >60 ML/MIN/1.73/M2
GLUCOSE SERPL-MCNC: 88 MG/DL (ref 70–110)
GLUCOSE UR QL STRIP: NEGATIVE
HCG INTACT+B SERPL-ACNC: <1.2 MIU/ML
HCT VFR BLD AUTO: 39.9 % (ref 37–48.5)
HGB BLD-MCNC: 12.4 GM/DL (ref 12–16)
HGB UR QL STRIP: NEGATIVE
HOLD SPECIMEN: NORMAL
IMM GRANULOCYTES # BLD AUTO: 0.02 K/UL (ref 0–0.04)
IMM GRANULOCYTES NFR BLD AUTO: 0.2 % (ref 0–0.5)
KETONES UR QL STRIP: NEGATIVE
LACTATE SERPL-SCNC: 0.9 MMOL/L (ref 0.5–2.2)
LDH SERPL L TO P-CCNC: 0.88 MMOL/L (ref 0.5–2.2)
LEUKOCYTE ESTERASE UR QL STRIP: NEGATIVE
LYMPHOCYTES # BLD AUTO: 2.45 K/UL (ref 1–4.8)
MAGNESIUM SERPL-MCNC: 2.1 MG/DL (ref 1.6–2.6)
MCH RBC QN AUTO: 26.9 PG (ref 27–31)
MCHC RBC AUTO-ENTMCNC: 31.1 G/DL (ref 32–36)
MCV RBC AUTO: 87 FL (ref 82–98)
METHADONE UR QL SCN: NEGATIVE
NITRITE UR QL STRIP: NEGATIVE
NUCLEATED RBC (/100WBC) (OHS): 0 /100 WBC
OPIATES UR QL SCN: ABNORMAL
PCP UR QL: NEGATIVE
PH UR STRIP: 8 [PH]
PLATELET # BLD AUTO: 378 K/UL (ref 150–450)
PMV BLD AUTO: 8.7 FL (ref 9.2–12.9)
POTASSIUM SERPL-SCNC: 3.9 MMOL/L (ref 3.5–5.1)
PROCALCITONIN SERPL-MCNC: 0.09 NG/ML
PROT SERPL-MCNC: 9.6 GM/DL (ref 6–8.4)
PROT UR QL STRIP: NEGATIVE
RBC # BLD AUTO: 4.61 M/UL (ref 4–5.4)
RELATIVE EOSINOPHIL (OHS): 0.2 %
RELATIVE LYMPHOCYTE (OHS): 29.8 % (ref 18–48)
RELATIVE MONOCYTE (OHS): 9.5 % (ref 4–15)
RELATIVE NEUTROPHIL (OHS): 59.8 % (ref 38–73)
SAMPLE: NORMAL
SODIUM SERPL-SCNC: 136 MMOL/L (ref 136–145)
SP GR UR STRIP: 1.01
UROBILINOGEN UR STRIP-ACNC: NEGATIVE EU/DL
WBC # BLD AUTO: 8.21 K/UL (ref 3.9–12.7)

## 2025-06-13 PROCEDURE — 63600175 PHARM REV CODE 636 W HCPCS: Performed by: NURSE PRACTITIONER

## 2025-06-13 PROCEDURE — 90715 TDAP VACCINE 7 YRS/> IM: CPT | Performed by: NURSE PRACTITIONER

## 2025-06-13 PROCEDURE — 87040 BLOOD CULTURE FOR BACTERIA: CPT | Performed by: NURSE PRACTITIONER

## 2025-06-13 PROCEDURE — 81025 URINE PREGNANCY TEST: CPT | Performed by: NURSE PRACTITIONER

## 2025-06-13 PROCEDURE — 96375 TX/PRO/DX INJ NEW DRUG ADDON: CPT

## 2025-06-13 PROCEDURE — 80053 COMPREHEN METABOLIC PANEL: CPT | Performed by: NURSE PRACTITIONER

## 2025-06-13 PROCEDURE — 80307 DRUG TEST PRSMV CHEM ANLYZR: CPT | Performed by: NURSE PRACTITIONER

## 2025-06-13 PROCEDURE — 25000003 PHARM REV CODE 250: Performed by: NURSE PRACTITIONER

## 2025-06-13 PROCEDURE — 90471 IMMUNIZATION ADMIN: CPT | Performed by: NURSE PRACTITIONER

## 2025-06-13 PROCEDURE — 93010 ELECTROCARDIOGRAM REPORT: CPT | Mod: ,,, | Performed by: INTERNAL MEDICINE

## 2025-06-13 PROCEDURE — 85025 COMPLETE CBC W/AUTO DIFF WBC: CPT | Performed by: NURSE PRACTITIONER

## 2025-06-13 PROCEDURE — 81003 URINALYSIS AUTO W/O SCOPE: CPT | Performed by: NURSE PRACTITIONER

## 2025-06-13 PROCEDURE — 84145 PROCALCITONIN (PCT): CPT | Performed by: NURSE PRACTITIONER

## 2025-06-13 PROCEDURE — 36415 COLL VENOUS BLD VENIPUNCTURE: CPT | Performed by: NURSE PRACTITIONER

## 2025-06-13 PROCEDURE — 25500020 PHARM REV CODE 255

## 2025-06-13 PROCEDURE — 86140 C-REACTIVE PROTEIN: CPT

## 2025-06-13 PROCEDURE — 99285 EMERGENCY DEPT VISIT HI MDM: CPT | Mod: 25

## 2025-06-13 PROCEDURE — 63600175 PHARM REV CODE 636 W HCPCS: Mod: JZ,TB | Performed by: NURSE PRACTITIONER

## 2025-06-13 PROCEDURE — 96365 THER/PROPH/DIAG IV INF INIT: CPT

## 2025-06-13 PROCEDURE — 84702 CHORIONIC GONADOTROPIN TEST: CPT | Performed by: NURSE PRACTITIONER

## 2025-06-13 PROCEDURE — 93005 ELECTROCARDIOGRAM TRACING: CPT

## 2025-06-13 PROCEDURE — 96372 THER/PROPH/DIAG INJ SC/IM: CPT | Mod: 59 | Performed by: NURSE PRACTITIONER

## 2025-06-13 PROCEDURE — 87150 DNA/RNA AMPLIFIED PROBE: CPT | Performed by: NURSE PRACTITIONER

## 2025-06-13 PROCEDURE — 83605 ASSAY OF LACTIC ACID: CPT | Performed by: NURSE PRACTITIONER

## 2025-06-13 PROCEDURE — 83735 ASSAY OF MAGNESIUM: CPT | Performed by: NURSE PRACTITIONER

## 2025-06-13 PROCEDURE — 96367 TX/PROPH/DG ADDL SEQ IV INF: CPT

## 2025-06-13 PROCEDURE — 96366 THER/PROPH/DIAG IV INF ADDON: CPT

## 2025-06-13 PROCEDURE — 94761 N-INVAS EAR/PLS OXIMETRY MLT: CPT

## 2025-06-13 RX ORDER — ACETAMINOPHEN 500 MG
1000 TABLET ORAL
Status: COMPLETED | OUTPATIENT
Start: 2025-06-13 | End: 2025-06-13

## 2025-06-13 RX ORDER — VANCOMYCIN 2 GRAM/500 ML IN 0.9 % SODIUM CHLORIDE INTRAVENOUS
2000 ONCE
Status: COMPLETED | OUTPATIENT
Start: 2025-06-13 | End: 2025-06-13

## 2025-06-13 RX ORDER — DIPHENOXYLATE HYDROCHLORIDE AND ATROPINE SULFATE 2.5; .025 MG/1; MG/1
1 TABLET ORAL
Status: COMPLETED | OUTPATIENT
Start: 2025-06-13 | End: 2025-06-14

## 2025-06-13 RX ORDER — IBUPROFEN 600 MG/1
600 TABLET, FILM COATED ORAL
Status: COMPLETED | OUTPATIENT
Start: 2025-06-13 | End: 2025-06-13

## 2025-06-13 RX ORDER — MORPHINE SULFATE 4 MG/ML
4 INJECTION, SOLUTION INTRAMUSCULAR; INTRAVENOUS
Refills: 0 | Status: COMPLETED | OUTPATIENT
Start: 2025-06-13 | End: 2025-06-13

## 2025-06-13 RX ORDER — KETOROLAC TROMETHAMINE 30 MG/ML
15 INJECTION, SOLUTION INTRAMUSCULAR; INTRAVENOUS
Status: COMPLETED | OUTPATIENT
Start: 2025-06-13 | End: 2025-06-13

## 2025-06-13 RX ORDER — ONDANSETRON HYDROCHLORIDE 2 MG/ML
4 INJECTION, SOLUTION INTRAVENOUS
Status: COMPLETED | OUTPATIENT
Start: 2025-06-13 | End: 2025-06-13

## 2025-06-13 RX ADMIN — KETOROLAC TROMETHAMINE 15 MG: 30 INJECTION, SOLUTION INTRAMUSCULAR; INTRAVENOUS at 07:06

## 2025-06-13 RX ADMIN — ONDANSETRON 4 MG: 2 INJECTION INTRAMUSCULAR; INTRAVENOUS at 06:06

## 2025-06-13 RX ADMIN — SODIUM CHLORIDE 1000 ML: 900 INJECTION, SOLUTION INTRAVENOUS at 06:06

## 2025-06-13 RX ADMIN — PIPERACILLIN AND TAZOBACTAM 4.5 G: 4; .5 INJECTION, POWDER, LYOPHILIZED, FOR SOLUTION INTRAVENOUS at 07:06

## 2025-06-13 RX ADMIN — ACETAMINOPHEN 1000 MG: 500 TABLET, FILM COATED ORAL at 07:06

## 2025-06-13 RX ADMIN — CLOSTRIDIUM TETANI TOXOID ANTIGEN (FORMALDEHYDE INACTIVATED), CORYNEBACTERIUM DIPHTHERIAE TOXOID ANTIGEN (FORMALDEHYDE INACTIVATED), BORDETELLA PERTUSSIS TOXOID ANTIGEN (GLUTARALDEHYDE INACTIVATED), BORDETELLA PERTUSSIS FILAMENTOUS HEMAGGLUTININ ANTIGEN (FORMALDEHYDE INACTIVATED), BORDETELLA PERTUSSIS PERTACTIN ANTIGEN, AND BORDETELLA PERTUSSIS FIMBRIAE 2/3 ANTIGEN 0.5 ML: 5; 2; 2.5; 5; 3; 5 INJECTION, SUSPENSION INTRAMUSCULAR at 07:06

## 2025-06-13 RX ADMIN — IBUPROFEN 600 MG: 600 TABLET ORAL at 08:06

## 2025-06-13 RX ADMIN — IOHEXOL 100 ML: 350 INJECTION, SOLUTION INTRAVENOUS at 11:06

## 2025-06-13 RX ADMIN — MORPHINE SULFATE 4 MG: 4 INJECTION INTRAVENOUS at 06:06

## 2025-06-13 RX ADMIN — SODIUM CHLORIDE 2000 MG: 9 INJECTION, SOLUTION INTRAVENOUS at 08:06
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
## 2025-06-13 NOTE — FIRST PROVIDER EVALUATION
Emergency Department TeleTriage Encounter Note      CHIEF COMPLAINT    Chief Complaint   Patient presents with    Abscess     Patient reports an abscess on the left shin. She is not sure if it was an insect bite or ingrown hair. Left shin is red and warm to touch. AAOx3 NADN       VITAL SIGNS   Initial Vitals [06/13/25 1648]   BP Pulse Resp Temp SpO2   (!) 145/96 (!) 127 16 100 °F (37.8 °C) 98 %      MAP       --            ALLERGIES    Review of patient's allergies indicates:  No Known Allergies    PROVIDER TRIAGE NOTE  Patient presents with complaint of multiple abscesses. Denies DM.       ORDERS  Labs Reviewed - No data to display    ED Orders (720h ago, onward)      None              Virtual Visit Note: The provider triage portion of this emergency department evaluation and documentation was performed via [employer], a HIPAA-compliant telemedicine application, in concert with a tele-presenter in the room. A face to face patient evaluation with one of my colleagues will occur once the patient is placed in an emergency department room.      DISCLAIMER: This note was prepared with [employer] voice recognition transcription software. Garbled syntax, mangled pronouns, and other bizarre constructions may be attributed to that software system.

## 2025-06-14 PROBLEM — L02.415 ABSCESS OF RIGHT LOWER EXTREMITY: Status: ACTIVE | Noted: 2025-06-14

## 2025-06-14 PROBLEM — R19.7 DIARRHEA: Status: ACTIVE | Noted: 2025-06-14

## 2025-06-14 PROBLEM — L03.116 CELLULITIS OF LEFT LOWER EXTREMITY: Status: ACTIVE | Noted: 2025-06-14

## 2025-06-14 PROCEDURE — 25000003 PHARM REV CODE 250: Performed by: EMERGENCY MEDICINE

## 2025-06-14 PROCEDURE — 94761 N-INVAS EAR/PLS OXIMETRY MLT: CPT

## 2025-06-14 PROCEDURE — 27000207 HC ISOLATION

## 2025-06-14 PROCEDURE — 3E0234Z INTRODUCTION OF SERUM, TOXOID AND VACCINE INTO MUSCLE, PERCUTANEOUS APPROACH: ICD-10-PCS | Performed by: HOSPITALIST

## 2025-06-14 PROCEDURE — 63600175 PHARM REV CODE 636 W HCPCS: Performed by: EMERGENCY MEDICINE

## 2025-06-14 PROCEDURE — 21400001 HC TELEMETRY ROOM

## 2025-06-14 PROCEDURE — 99223 1ST HOSP IP/OBS HIGH 75: CPT | Mod: ,,, | Performed by: SPECIALIST

## 2025-06-14 PROCEDURE — 63600175 PHARM REV CODE 636 W HCPCS: Performed by: HOSPITALIST

## 2025-06-14 PROCEDURE — 25000003 PHARM REV CODE 250: Performed by: NURSE PRACTITIONER

## 2025-06-14 PROCEDURE — 25000003 PHARM REV CODE 250: Performed by: HOSPITALIST

## 2025-06-14 PROCEDURE — 63600175 PHARM REV CODE 636 W HCPCS: Performed by: NURSE PRACTITIONER

## 2025-06-14 RX ORDER — VALACYCLOVIR HYDROCHLORIDE 500 MG/1
1000 TABLET, FILM COATED ORAL 2 TIMES DAILY
Status: DISCONTINUED | OUTPATIENT
Start: 2025-06-14 | End: 2025-06-20 | Stop reason: HOSPADM

## 2025-06-14 RX ORDER — IBUPROFEN 200 MG
24 TABLET ORAL
Status: DISCONTINUED | OUTPATIENT
Start: 2025-06-14 | End: 2025-06-14

## 2025-06-14 RX ORDER — NALOXONE HCL 0.4 MG/ML
0.02 VIAL (ML) INJECTION
Status: DISCONTINUED | OUTPATIENT
Start: 2025-06-14 | End: 2025-06-20 | Stop reason: HOSPADM

## 2025-06-14 RX ORDER — BUPRENORPHINE HYDROCHLORIDE AND NALOXONE HYDROCHLORIDE DIHYDRATE 8; 2 MG/1; MG/1
8 TABLET SUBLINGUAL
Status: COMPLETED | OUTPATIENT
Start: 2025-06-14 | End: 2025-06-14

## 2025-06-14 RX ORDER — IBUPROFEN 200 MG
16 TABLET ORAL
Status: DISCONTINUED | OUTPATIENT
Start: 2025-06-14 | End: 2025-06-14

## 2025-06-14 RX ORDER — SODIUM CHLORIDE 0.9 % (FLUSH) 0.9 %
10 SYRINGE (ML) INJECTION EVERY 8 HOURS PRN
Status: DISCONTINUED | OUTPATIENT
Start: 2025-06-14 | End: 2025-06-14

## 2025-06-14 RX ORDER — ACETAMINOPHEN 500 MG
1000 TABLET ORAL EVERY 8 HOURS PRN
Status: DISCONTINUED | OUTPATIENT
Start: 2025-06-14 | End: 2025-06-20 | Stop reason: HOSPADM

## 2025-06-14 RX ORDER — MORPHINE SULFATE 4 MG/ML
4 INJECTION, SOLUTION INTRAMUSCULAR; INTRAVENOUS EVERY 4 HOURS PRN
Refills: 0 | Status: DISCONTINUED | OUTPATIENT
Start: 2025-06-14 | End: 2025-06-15

## 2025-06-14 RX ORDER — GABAPENTIN 300 MG/1
300 CAPSULE ORAL 3 TIMES DAILY
Status: DISCONTINUED | OUTPATIENT
Start: 2025-06-14 | End: 2025-06-18

## 2025-06-14 RX ORDER — OXYCODONE HYDROCHLORIDE 10 MG/1
10 TABLET ORAL EVERY 4 HOURS PRN
Refills: 0 | Status: DISCONTINUED | OUTPATIENT
Start: 2025-06-14 | End: 2025-06-20 | Stop reason: HOSPADM

## 2025-06-14 RX ORDER — LIDOCAINE 50 MG/G
1 PATCH TOPICAL DAILY
Status: DISCONTINUED | OUTPATIENT
Start: 2025-06-14 | End: 2025-06-20 | Stop reason: HOSPADM

## 2025-06-14 RX ORDER — GLUCAGON 1 MG
1 KIT INJECTION
Status: DISCONTINUED | OUTPATIENT
Start: 2025-06-14 | End: 2025-06-14

## 2025-06-14 RX ORDER — ONDANSETRON HYDROCHLORIDE 2 MG/ML
4 INJECTION, SOLUTION INTRAVENOUS EVERY 8 HOURS PRN
Status: DISCONTINUED | OUTPATIENT
Start: 2025-06-14 | End: 2025-06-20 | Stop reason: HOSPADM

## 2025-06-14 RX ORDER — SODIUM CHLORIDE, SODIUM LACTATE, POTASSIUM CHLORIDE, CALCIUM CHLORIDE 600; 310; 30; 20 MG/100ML; MG/100ML; MG/100ML; MG/100ML
INJECTION, SOLUTION INTRAVENOUS CONTINUOUS
Status: DISCONTINUED | OUTPATIENT
Start: 2025-06-14 | End: 2025-06-17

## 2025-06-14 RX ORDER — ACETAMINOPHEN 325 MG/1
650 TABLET ORAL EVERY 4 HOURS PRN
Status: DISCONTINUED | OUTPATIENT
Start: 2025-06-14 | End: 2025-06-14

## 2025-06-14 RX ADMIN — GABAPENTIN 300 MG: 300 CAPSULE ORAL at 08:06

## 2025-06-14 RX ADMIN — PIPERACILLIN SODIUM AND TAZOBACTAM SODIUM 4.5 G: 4; .5 INJECTION, POWDER, FOR SOLUTION INTRAVENOUS at 08:06

## 2025-06-14 RX ADMIN — OXYCODONE HYDROCHLORIDE 10 MG: 10 TABLET ORAL at 08:06

## 2025-06-14 RX ADMIN — PIPERACILLIN SODIUM AND TAZOBACTAM SODIUM 4.5 G: 4; .5 INJECTION, POWDER, FOR SOLUTION INTRAVENOUS at 02:06

## 2025-06-14 RX ADMIN — OXYCODONE HYDROCHLORIDE 10 MG: 10 TABLET ORAL at 03:06

## 2025-06-14 RX ADMIN — MORPHINE SULFATE 4 MG: 4 INJECTION INTRAVENOUS at 10:06

## 2025-06-14 RX ADMIN — GABAPENTIN 300 MG: 300 CAPSULE ORAL at 10:06

## 2025-06-14 RX ADMIN — ACETAMINOPHEN 1000 MG: 500 TABLET, FILM COATED ORAL at 09:06

## 2025-06-14 RX ADMIN — BUPRENORPHINE HYDROCHLORIDE AND NALOXONE HYDROCHLORIDE DIHYDRATE 8 MG: 8; 2 TABLET SUBLINGUAL at 01:06

## 2025-06-14 RX ADMIN — GABAPENTIN 300 MG: 300 CAPSULE ORAL at 02:06

## 2025-06-14 RX ADMIN — ACETAMINOPHEN 1000 MG: 500 TABLET, FILM COATED ORAL at 01:06

## 2025-06-14 RX ADMIN — DIPHENOXYLATE HYDROCHLORIDE AND ATROPINE SULFATE 1 TABLET: 2.5; .025 TABLET ORAL at 12:06

## 2025-06-14 RX ADMIN — VALACYCLOVIR HYDROCHLORIDE 1000 MG: 500 TABLET, FILM COATED ORAL at 08:06

## 2025-06-14 RX ADMIN — PIPERACILLIN SODIUM AND TAZOBACTAM SODIUM 4.5 G: 4; .5 INJECTION, POWDER, FOR SOLUTION INTRAVENOUS at 11:06

## 2025-06-14 RX ADMIN — GABAPENTIN 300 MG: 300 CAPSULE ORAL at 03:06

## 2025-06-14 RX ADMIN — VANCOMYCIN HYDROCHLORIDE 1750 MG: 1.75 INJECTION, POWDER, LYOPHILIZED, FOR SOLUTION INTRAVENOUS at 11:06

## 2025-06-14 RX ADMIN — SODIUM CHLORIDE, POTASSIUM CHLORIDE, SODIUM LACTATE AND CALCIUM CHLORIDE: 600; 310; 30; 20 INJECTION, SOLUTION INTRAVENOUS at 03:06

## 2025-06-14 RX ADMIN — OXYCODONE HYDROCHLORIDE 10 MG: 10 TABLET ORAL at 10:06

## 2025-06-14 RX ADMIN — VANCOMYCIN HYDROCHLORIDE 1750 MG: 1.75 INJECTION, POWDER, LYOPHILIZED, FOR SOLUTION INTRAVENOUS at 10:06

## 2025-06-14 RX ADMIN — MORPHINE SULFATE 4 MG: 4 INJECTION INTRAVENOUS at 06:06

## 2025-06-14 NOTE — PROGRESS NOTES
Received consult today for knee effusion.  The notes suggest no concern for septic arthritis.  Will see in AM and discuss aspiration if needed.

## 2025-06-14 NOTE — ASSESSMENT & PLAN NOTE
CT- 1.8 cm low density nonenhancing lesion medial to the right femoral medial condyle. Findings may represent small subcutaneous abscess from dermal injury or insect bite. Focal hematoma is felt to be much less likely. This is not felt to be tumor.  Areas of subcutaneous edema in both lower extremities and scattered areas of subcutaneous air. Findings raise the question of injections versus trauma.    Vanc/Zosyn  IVF  Morphine  Consider General Surgery consult
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
## 2025-06-14 NOTE — HPI
"Per Rakesh Morrell, MARIN:    "The patient is a 45 y.o. female with medical history of IV drug abuse (last injected 1 hour prior to coming to the ED), recent shingles infection, and leg wounds who presents with bilateral leg pain with associated redness and erythema.  Patient states she does "inject dope and cocaine and has multiple abscesses due to injection sites.  Patient states increased pain in her right knee with difficulty ambulating.  Patient endorsing fever and chills.  No nausea or vomiting.  CT positive for small abscess to right femoral medial condyle and various areas of subq air likely due to injection sites.  She will be admitted for further management of her right knee abscess."  "
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
## 2025-06-14 NOTE — PROGRESS NOTES
"Pharmacokinetic Initial Assessment: IV Vancomycin    Assessment/Plan:    Initiate intravenous vancomycin with loading dose of 2000 mg once followed by a maintenance dose of vancomycin 1750mg IV every 12 hours  Desired empiric serum trough concentration is 10 to 20 mcg/mL  Draw vancomycin trough level 60 min prior to fourth dose on 6/15 at approximately 0800  Pharmacy will continue to follow and monitor vancomycin.      Please contact pharmacy at extension [phone] with any questions regarding this assessment.     Thank you for the consult,   Mikaela Sheffield       Patient brief summary:  Leslie Gann is a 45 y.o. female initiated on antimicrobial therapy with IV Vancomycin for treatment of suspected skin & soft tissue infection    Drug Allergies:   Review of patient's allergies indicates:  No Known Allergies    Actual Body Weight:   97.5 kg    Renal Function:   Estimated Creatinine Clearance: 101.5 mL/min (based on SCr of 0.8 mg/dL).,     Dialysis Method (if applicable):  N/A    CBC (last 72 hours):  Recent Labs   Lab Result Units 06/13/25  1746   WBC K/uL 8.21   HGB gm/dL 12.4   HCT % 39.9   Platelet Count K/uL 378   Lymph % % 29.8   Mono % % 9.5   Eos % % 0.2   Basophil % % 0.5       Metabolic Panel (last 72 hours):  Recent Labs   Lab Result Units 06/13/25  1746 06/13/25  1908   Sodium mmol/L 136  --    Potassium mmol/L 3.9  --    Chloride mmol/L 105  --    CO2 mmol/L 17*  --    Glucose mg/dL 88  --    Glucose, UA   --  Negative   BUN mg/dL 9  --    Creatinine mg/dL 0.8  --    Urine Creatinine mg/dL  --  67.4   Albumin g/dL 3.0*  --    Bilirubin Total mg/dL 0.3  --    ALP unit/L 157*  --    AST unit/L 28  --    ALT unit/L 21  --    Magnesium  mg/dL 2.1  --        Drug levels (last 3 results):  No results for input(s): "VANCOMYCINRA", "VANCORANDOM", "VANCOMYCINPE", "VANCOPEAK", "VANCOMYCINTR", "VANCOTROUGH" in the last 72 hours.    Microbiologic Results:  Microbiology Results (last 7 days)       Procedure " Component Value Units Date/Time    Stool culture [[id number]]     Order Status: Sent Specimen: Stool     Clostridium difficile EIA [[id number]]     Order Status: Sent Specimen: Stool     Blood culture x two cultures. Draw prior to antibiotics. [[id number]] Collected: 06/13/25 1834    Order Status: Sent Specimen: Blood from Peripheral, Upper Arm, Right Updated: 06/13/25 1838    Blood culture x two cultures. Draw prior to antibiotics. [[id number]] Collected: 06/13/25 1748    Order Status: Sent Specimen: Blood from Peripheral, Upper Arm, Left Updated: 06/13/25 1802

## 2025-06-14 NOTE — PROGRESS NOTES
Notified on call provider Porsche FUNES of patient refusal of any further lab sticks. Phlebotomy had 2 unsuccessful attempts trying to obtain labs from patient once she made it to the floor. No new orders given. Education provided to patient. Will continue to monitor closely.

## 2025-06-15 ENCOUNTER — ANESTHESIA EVENT (OUTPATIENT)
Dept: SURGERY | Facility: OTHER | Age: 45
End: 2025-06-15
Payer: COMMERCIAL

## 2025-06-15 ENCOUNTER — ANESTHESIA (OUTPATIENT)
Dept: SURGERY | Facility: OTHER | Age: 45
End: 2025-06-15
Payer: MEDICAID

## 2025-06-15 PROBLEM — B95.62 BACTEREMIA DUE TO METHICILLIN RESISTANT STAPHYLOCOCCUS AUREUS: Status: ACTIVE | Noted: 2025-06-15

## 2025-06-15 PROBLEM — M00.062 STAPHYLOCOCCAL ARTHRITIS OF LEFT KNEE: Status: ACTIVE | Noted: 2025-06-14

## 2025-06-15 PROBLEM — L02.415 ABSCESS OF RIGHT LOWER EXTREMITY: Status: RESOLVED | Noted: 2025-06-14 | Resolved: 2025-06-15

## 2025-06-15 PROBLEM — F19.10: Status: ACTIVE | Noted: 2025-06-15

## 2025-06-15 PROBLEM — A49.02 METHICILLIN RESISTANT STAPHYLOCOCCUS AUREUS INFECTION: Status: ACTIVE | Noted: 2025-06-15

## 2025-06-15 PROBLEM — R78.81 BACTEREMIA DUE TO METHICILLIN RESISTANT STAPHYLOCOCCUS AUREUS: Status: ACTIVE | Noted: 2025-06-15

## 2025-06-15 PROBLEM — F11.90 OPIOID USE DISORDER: Status: ACTIVE | Noted: 2025-06-15

## 2025-06-15 PROBLEM — A41.9 SEPSIS: Status: ACTIVE | Noted: 2025-06-15

## 2025-06-15 LAB
ABSOLUTE EOSINOPHIL (OHS): 0.27 K/UL
ABSOLUTE MONOCYTE (OHS): 1.06 K/UL (ref 0.3–1)
ABSOLUTE NEUTROPHIL COUNT (OHS): 3.33 K/UL (ref 1.8–7.7)
ANION GAP (OHS): 9 MMOL/L (ref 8–16)
APPEARANCE FLD: NORMAL
BASOPHILS # BLD AUTO: 0.03 K/UL
BASOPHILS NFR BLD AUTO: 0.3 %
BUN SERPL-MCNC: 8 MG/DL (ref 6–20)
CALCIUM SERPL-MCNC: 8.8 MG/DL (ref 8.7–10.5)
CHLORIDE SERPL-SCNC: 109 MMOL/L (ref 95–110)
CO2 SERPL-SCNC: 22 MMOL/L (ref 23–29)
COLOR FLD: YELLOW
CREAT SERPL-MCNC: 0.9 MG/DL (ref 0.5–1.4)
ERYTHROCYTE [DISTWIDTH] IN BLOOD BY AUTOMATED COUNT: 15.5 % (ref 11.5–14.5)
GFR SERPLBLD CREATININE-BSD FMLA CKD-EPI: >60 ML/MIN/1.73/M2
GLUCOSE SERPL-MCNC: 116 MG/DL (ref 70–110)
HCT VFR BLD AUTO: 29.9 % (ref 37–48.5)
HGB BLD-MCNC: 9.7 GM/DL (ref 12–16)
IMM GRANULOCYTES # BLD AUTO: 0.02 K/UL (ref 0–0.04)
IMM GRANULOCYTES NFR BLD AUTO: 0.2 % (ref 0–0.5)
LYMPHOCYTES # BLD AUTO: 4 K/UL (ref 1–4.8)
LYMPHOCYTES NFR FLD MANUAL: 2 %
MAGNESIUM SERPL-MCNC: 2.1 MG/DL (ref 1.6–2.6)
MCH RBC QN AUTO: 27.2 PG (ref 27–31)
MCHC RBC AUTO-ENTMCNC: 32.4 G/DL (ref 32–36)
MCV RBC AUTO: 84 FL (ref 82–98)
MONOS+MACROS NFR FLD MANUAL: 3 %
MRSA ID BY PCR (OHS): POSITIVE
NEUTROPHILS NFR FLD MANUAL: 95 %
NUCLEATED RBC (/100WBC) (OHS): 0 /100 WBC
PHOSPHATE SERPL-MCNC: 3.2 MG/DL (ref 2.7–4.5)
PLATELET # BLD AUTO: 367 K/UL (ref 150–450)
PMV BLD AUTO: 8.6 FL (ref 9.2–12.9)
POTASSIUM SERPL-SCNC: 4.1 MMOL/L (ref 3.5–5.1)
RBC # BLD AUTO: 3.56 M/UL (ref 4–5.4)
RELATIVE EOSINOPHIL (OHS): 3.1 %
RELATIVE LYMPHOCYTE (OHS): 45.9 % (ref 18–48)
RELATIVE MONOCYTE (OHS): 12.2 % (ref 4–15)
RELATIVE NEUTROPHIL (OHS): 38.3 % (ref 38–73)
SODIUM SERPL-SCNC: 140 MMOL/L (ref 136–145)
STAPH AUREUS ID BY PCR (OHS): POSITIVE
WBC # BLD AUTO: 8.71 K/UL (ref 3.9–12.7)
WBC # FLD: NORMAL /CU MM

## 2025-06-15 PROCEDURE — 89051 BODY FLUID CELL COUNT: CPT | Performed by: HOSPITALIST

## 2025-06-15 PROCEDURE — 25000003 PHARM REV CODE 250: Performed by: HOSPITALIST

## 2025-06-15 PROCEDURE — 3E1U38Z IRRIGATION OF JOINTS USING IRRIGATING SUBSTANCE, PERCUTANEOUS APPROACH: ICD-10-PCS | Performed by: ORTHOPAEDIC SURGERY

## 2025-06-15 PROCEDURE — 87075 CULTR BACTERIA EXCEPT BLOOD: CPT | Performed by: HOSPITALIST

## 2025-06-15 PROCEDURE — 94761 N-INVAS EAR/PLS OXIMETRY MLT: CPT

## 2025-06-15 PROCEDURE — 37000009 HC ANESTHESIA EA ADD 15 MINS: Performed by: ORTHOPAEDIC SURGERY

## 2025-06-15 PROCEDURE — 83735 ASSAY OF MAGNESIUM: CPT | Performed by: HOSPITALIST

## 2025-06-15 PROCEDURE — 63600175 PHARM REV CODE 636 W HCPCS: Performed by: STUDENT IN AN ORGANIZED HEALTH CARE EDUCATION/TRAINING PROGRAM

## 2025-06-15 PROCEDURE — 87186 SC STD MICRODIL/AGAR DIL: CPT | Performed by: HOSPITALIST

## 2025-06-15 PROCEDURE — C1729 CATH, DRAINAGE: HCPCS | Performed by: ORTHOPAEDIC SURGERY

## 2025-06-15 PROCEDURE — D9220A PRA ANESTHESIA: Mod: CRNA,,, | Performed by: STUDENT IN AN ORGANIZED HEALTH CARE EDUCATION/TRAINING PROGRAM

## 2025-06-15 PROCEDURE — D9220A PRA ANESTHESIA: Mod: ANES,,, | Performed by: ANESTHESIOLOGY

## 2025-06-15 PROCEDURE — 37000008 HC ANESTHESIA 1ST 15 MINUTES: Performed by: ORTHOPAEDIC SURGERY

## 2025-06-15 PROCEDURE — 71000033 HC RECOVERY, INTIAL HOUR: Performed by: ORTHOPAEDIC SURGERY

## 2025-06-15 PROCEDURE — 87205 SMEAR GRAM STAIN: CPT | Performed by: ORTHOPAEDIC SURGERY

## 2025-06-15 PROCEDURE — 36000707: Performed by: ORTHOPAEDIC SURGERY

## 2025-06-15 PROCEDURE — 21400001 HC TELEMETRY ROOM

## 2025-06-15 PROCEDURE — 84100 ASSAY OF PHOSPHORUS: CPT | Performed by: HOSPITALIST

## 2025-06-15 PROCEDURE — 27201423 OPTIME MED/SURG SUP & DEVICES STERILE SUPPLY: Performed by: ORTHOPAEDIC SURGERY

## 2025-06-15 PROCEDURE — 36000706: Performed by: ORTHOPAEDIC SURGERY

## 2025-06-15 PROCEDURE — 80048 BASIC METABOLIC PNL TOTAL CA: CPT | Performed by: HOSPITALIST

## 2025-06-15 PROCEDURE — 27000207 HC ISOLATION

## 2025-06-15 PROCEDURE — 63600175 PHARM REV CODE 636 W HCPCS: Performed by: HOSPITALIST

## 2025-06-15 PROCEDURE — C1751 CATH, INF, PER/CENT/MIDLINE: HCPCS

## 2025-06-15 PROCEDURE — 36410 VNPNXR 3YR/> PHY/QHP DX/THER: CPT

## 2025-06-15 PROCEDURE — 25000003 PHARM REV CODE 250: Performed by: NURSE PRACTITIONER

## 2025-06-15 PROCEDURE — 63600175 PHARM REV CODE 636 W HCPCS: Performed by: NURSE PRACTITIONER

## 2025-06-15 PROCEDURE — 36415 COLL VENOUS BLD VENIPUNCTURE: CPT | Performed by: HOSPITALIST

## 2025-06-15 PROCEDURE — 25000003 PHARM REV CODE 250: Performed by: STUDENT IN AN ORGANIZED HEALTH CARE EDUCATION/TRAINING PROGRAM

## 2025-06-15 PROCEDURE — 85025 COMPLETE CBC W/AUTO DIFF WBC: CPT | Performed by: HOSPITALIST

## 2025-06-15 PROCEDURE — 0S9D3ZZ DRAINAGE OF LEFT KNEE JOINT, PERCUTANEOUS APPROACH: ICD-10-PCS | Performed by: ORTHOPAEDIC SURGERY

## 2025-06-15 RX ORDER — PROPOFOL 10 MG/ML
INJECTION, EMULSION INTRAVENOUS
Status: DISCONTINUED | OUTPATIENT
Start: 2025-06-15 | End: 2025-06-15

## 2025-06-15 RX ORDER — GLUCAGON 1 MG
1 KIT INJECTION
Status: DISCONTINUED | OUTPATIENT
Start: 2025-06-15 | End: 2025-06-16

## 2025-06-15 RX ORDER — SODIUM CHLORIDE 0.9 % (FLUSH) 0.9 %
3 SYRINGE (ML) INJECTION
Status: DISCONTINUED | OUTPATIENT
Start: 2025-06-15 | End: 2025-06-18

## 2025-06-15 RX ORDER — FENTANYL CITRATE 50 UG/ML
INJECTION, SOLUTION INTRAMUSCULAR; INTRAVENOUS
Status: DISCONTINUED | OUTPATIENT
Start: 2025-06-15 | End: 2025-06-15

## 2025-06-15 RX ORDER — HYDROMORPHONE HYDROCHLORIDE 2 MG/ML
INJECTION, SOLUTION INTRAMUSCULAR; INTRAVENOUS; SUBCUTANEOUS
Status: DISCONTINUED | OUTPATIENT
Start: 2025-06-15 | End: 2025-06-15

## 2025-06-15 RX ORDER — MORPHINE SULFATE 2 MG/ML
6 INJECTION, SOLUTION INTRAMUSCULAR; INTRAVENOUS EVERY 4 HOURS PRN
Status: DISCONTINUED | OUTPATIENT
Start: 2025-06-15 | End: 2025-06-20 | Stop reason: HOSPADM

## 2025-06-15 RX ORDER — KETOROLAC TROMETHAMINE 30 MG/ML
INJECTION, SOLUTION INTRAMUSCULAR; INTRAVENOUS
Status: DISCONTINUED | OUTPATIENT
Start: 2025-06-15 | End: 2025-06-15

## 2025-06-15 RX ORDER — MORPHINE SULFATE 2 MG/ML
6 INJECTION, SOLUTION INTRAMUSCULAR; INTRAVENOUS ONCE
Status: COMPLETED | OUTPATIENT
Start: 2025-06-15 | End: 2025-06-15

## 2025-06-15 RX ORDER — PROCHLORPERAZINE EDISYLATE 5 MG/ML
5 INJECTION INTRAMUSCULAR; INTRAVENOUS EVERY 30 MIN PRN
Status: DISCONTINUED | OUTPATIENT
Start: 2025-06-15 | End: 2025-06-18

## 2025-06-15 RX ORDER — LIDOCAINE HYDROCHLORIDE 20 MG/ML
INJECTION INTRAVENOUS
Status: DISCONTINUED | OUTPATIENT
Start: 2025-06-15 | End: 2025-06-15

## 2025-06-15 RX ORDER — DEXAMETHASONE SODIUM PHOSPHATE 4 MG/ML
INJECTION, SOLUTION INTRA-ARTICULAR; INTRALESIONAL; INTRAMUSCULAR; INTRAVENOUS; SOFT TISSUE
Status: DISCONTINUED | OUTPATIENT
Start: 2025-06-15 | End: 2025-06-15

## 2025-06-15 RX ORDER — ONDANSETRON HYDROCHLORIDE 2 MG/ML
INJECTION, SOLUTION INTRAVENOUS
Status: DISCONTINUED | OUTPATIENT
Start: 2025-06-15 | End: 2025-06-15

## 2025-06-15 RX ORDER — CEFAZOLIN SODIUM 1 G/3ML
INJECTION, POWDER, FOR SOLUTION INTRAMUSCULAR; INTRAVENOUS
Status: DISCONTINUED | OUTPATIENT
Start: 2025-06-15 | End: 2025-06-15

## 2025-06-15 RX ORDER — SODIUM CHLORIDE 0.9 % (FLUSH) 0.9 %
10 SYRINGE (ML) INJECTION EVERY 12 HOURS PRN
Status: DISCONTINUED | OUTPATIENT
Start: 2025-06-15 | End: 2025-06-20 | Stop reason: HOSPADM

## 2025-06-15 RX ORDER — HYDROMORPHONE HYDROCHLORIDE 2 MG/ML
0.4 INJECTION, SOLUTION INTRAMUSCULAR; INTRAVENOUS; SUBCUTANEOUS EVERY 5 MIN PRN
Status: DISCONTINUED | OUTPATIENT
Start: 2025-06-15 | End: 2025-06-16

## 2025-06-15 RX ORDER — MUPIROCIN 20 MG/G
OINTMENT TOPICAL 2 TIMES DAILY
Status: DISPENSED | OUTPATIENT
Start: 2025-06-15 | End: 2025-06-20

## 2025-06-15 RX ORDER — OXYCODONE HYDROCHLORIDE 5 MG/1
5 TABLET ORAL
Status: DISCONTINUED | OUTPATIENT
Start: 2025-06-15 | End: 2025-06-16

## 2025-06-15 RX ADMIN — CEFAZOLIN 2 G: 330 INJECTION, POWDER, FOR SOLUTION INTRAMUSCULAR; INTRAVENOUS at 03:06

## 2025-06-15 RX ADMIN — MORPHINE SULFATE 6 MG: 2 INJECTION, SOLUTION INTRAMUSCULAR; INTRAVENOUS at 11:06

## 2025-06-15 RX ADMIN — VALACYCLOVIR HYDROCHLORIDE 1000 MG: 500 TABLET, FILM COATED ORAL at 08:06

## 2025-06-15 RX ADMIN — FENTANYL CITRATE 100 MCG: 50 INJECTION, SOLUTION INTRAMUSCULAR; INTRAVENOUS at 03:06

## 2025-06-15 RX ADMIN — HYDROMORPHONE HYDROCHLORIDE 0.4 MG: 2 INJECTION INTRAMUSCULAR; INTRAVENOUS; SUBCUTANEOUS at 03:06

## 2025-06-15 RX ADMIN — ONDANSETRON HYDROCHLORIDE 4 MG: 2 INJECTION INTRAMUSCULAR; INTRAVENOUS at 03:06

## 2025-06-15 RX ADMIN — GABAPENTIN 300 MG: 300 CAPSULE ORAL at 08:06

## 2025-06-15 RX ADMIN — MORPHINE SULFATE 6 MG: 2 INJECTION, SOLUTION INTRAMUSCULAR; INTRAVENOUS at 05:06

## 2025-06-15 RX ADMIN — GABAPENTIN 300 MG: 300 CAPSULE ORAL at 09:06

## 2025-06-15 RX ADMIN — LIDOCAINE HYDROCHLORIDE 80 MG: 20 INJECTION, SOLUTION INTRAVENOUS at 03:06

## 2025-06-15 RX ADMIN — PROPOFOL 50 MG: 10 INJECTION, EMULSION INTRAVENOUS at 03:06

## 2025-06-15 RX ADMIN — MUPIROCIN: 20 OINTMENT TOPICAL at 08:06

## 2025-06-15 RX ADMIN — VALACYCLOVIR HYDROCHLORIDE 1000 MG: 500 TABLET, FILM COATED ORAL at 09:06

## 2025-06-15 RX ADMIN — DEXAMETHASONE SODIUM PHOSPHATE 8 MG: 4 INJECTION, SOLUTION INTRAMUSCULAR; INTRAVENOUS at 03:06

## 2025-06-15 RX ADMIN — VANCOMYCIN HYDROCHLORIDE 1750 MG: 1.75 INJECTION, POWDER, LYOPHILIZED, FOR SOLUTION INTRAVENOUS at 11:06

## 2025-06-15 RX ADMIN — MORPHINE SULFATE 6 MG: 2 INJECTION, SOLUTION INTRAMUSCULAR; INTRAVENOUS at 07:06

## 2025-06-15 RX ADMIN — OXYCODONE HYDROCHLORIDE 10 MG: 10 TABLET ORAL at 02:06

## 2025-06-15 RX ADMIN — MORPHINE SULFATE 4 MG: 4 INJECTION INTRAVENOUS at 02:06

## 2025-06-15 RX ADMIN — HYDROMORPHONE HYDROCHLORIDE 0.5 MG: 2 INJECTION INTRAMUSCULAR; INTRAVENOUS; SUBCUTANEOUS at 04:06

## 2025-06-15 RX ADMIN — ACETAMINOPHEN 1000 MG: 500 TABLET, FILM COATED ORAL at 04:06

## 2025-06-15 RX ADMIN — MUPIROCIN: 20 OINTMENT TOPICAL at 09:06

## 2025-06-15 RX ADMIN — SODIUM CHLORIDE: 0.9 INJECTION, SOLUTION INTRAVENOUS at 03:06

## 2025-06-15 RX ADMIN — PROPOFOL 200 MG: 10 INJECTION, EMULSION INTRAVENOUS at 03:06

## 2025-06-15 RX ADMIN — MORPHINE SULFATE 4 MG: 4 INJECTION INTRAVENOUS at 06:06

## 2025-06-15 RX ADMIN — HYDROMORPHONE HYDROCHLORIDE 0.6 MG: 2 INJECTION INTRAMUSCULAR; INTRAVENOUS; SUBCUTANEOUS at 03:06

## 2025-06-15 RX ADMIN — MORPHINE SULFATE 6 MG: 2 INJECTION, SOLUTION INTRAMUSCULAR; INTRAVENOUS at 02:06

## 2025-06-15 RX ADMIN — ACETAMINOPHEN 1000 MG: 500 TABLET, FILM COATED ORAL at 12:06

## 2025-06-15 RX ADMIN — KETOROLAC TROMETHAMINE 30 MG: 30 INJECTION, SOLUTION INTRAMUSCULAR; INTRAVENOUS at 03:06

## 2025-06-15 NOTE — CONSULTS
"  "The patient is a 45 y.o. female with medical history of IV drug abuse (last injected 1 hour prior to coming to the ED on day of admission), recent shingles infection, and leg wounds who presents with bilateral leg pain with associated redness and erythema.  Patient states she does "inject dope and cocaine and has multiple abscesses due to injection sites.  Patient states increased pain in her right knee with difficulty ambulating.  Patient endorsing fever and chills.  No nausea or vomiting.  CT positive for small abscess to right femoral medial condyle and various areas of subq air likely due to injection sites.  She will be admitted for further management of her right knee abscess."     Overview/Hospital Course:  Patient is a 45 year woman with and intravenous drug use presented with diffuse body pain involving multiple areas of redness erythema along with left knee pain.     Patient with evidence of sepsis secondary to skin soft tissue infection and also now concern for left knee infection as left knee more painful and swollen.  Blood cultures positive for Staphylococcus aureus (PCR testing consistent with MRSA).     NPO.  Adjusted pain medication for better pain control.  Orthopedic surgery consulted for evaluation aspiration and joint/washout.     Interval History:  Patient continuous spikes fevers.  Left knee more swollen/tender.    --    Patient has swollen and painful left knee.  Fevers.  +bacteremia    Vitals:    06/15/25 0359 06/15/25 0500 06/15/25 0621 06/15/25 0741   BP: 126/80      BP Location: Left arm      Patient Position: Lying      Pulse: 102      Resp: 16  17 18   Temp: 100.3 °F (37.9 °C)      TempSrc: Oral      SpO2: 98% 98%     Weight:       Height:            LLE - +effusion to the left knee; painful ROM; +warmth      A/p  likely septic left knee.   I steriley prepped and aspirated about 60cc of cloudy fluid from the left knee.  Will send for analysis.  Keep NPO.  Will try to do I&D later " today.  Written informed consent obtained from patient.

## 2025-06-15 NOTE — TRANSFER OF CARE
"Anesthesia Transfer of Care Note    Patient: Leslie Gann    Procedure(s) Performed: Procedure(s) (LRB):  IRRIGATION AND DEBRIDEMENT left knee (Left)    Patient location: PACU    Anesthesia Type: general    Transport from OR: Transported from OR on room air with adequate spontaneous ventilation    Post pain: adequate analgesia    Post assessment: no apparent anesthetic complications and tolerated procedure well    Post vital signs: stable    Level of consciousness: awake and alert    Nausea/Vomiting: no nausea/vomiting    Complications: none    Transfer of care protocol was followed      Last vitals: Visit Vitals  BP (!) 165/91 (BP Location: Left forearm, Patient Position: Lying)   Pulse 98   Temp 36.5 °C (97.7 °F) (Skin)   Resp 18   Ht 5' 5" (1.651 m)   Wt 97.5 kg (214 lb 15.2 oz)   LMP  (LMP Unknown)   SpO2 100%   Breastfeeding No   BMI 35.77 kg/m²     "

## 2025-06-15 NOTE — ASSESSMENT & PLAN NOTE
Possibly related to opioid withdrawal.  No diarrhea here since admission.  Discontinue C. diff testing if no diarrhea for 48 hours.

## 2025-06-15 NOTE — OP NOTE
[facility] Operative Note    Leslie Gann    06/15/2025    Preoperative diagnosis:  septic arthritis left knee    Postoperative diagnosis:  Same    Procedure:  Irrigation and debridement/arthrotomy of the left knee (CPT 99348)    Surgeon: Juan Alberto Ascencio     Assistants: None    Anesthesia: General endotracheal anesthesia    Estimated blood loss:  minimal    Complications:  None    History:  45 year old female with history of MRSA bacteremia, pain and effusion to the left knee.  Aspirate showed cloudy fluid.  Has pain and swelling with fevers.  Therefore, we have recommended I&D of the left knee.  Risks, options, benefits of surgery were explained to the patient.  She stated understanding and wishes to proceed  The risks include:  bleeding, infection, blood clot, injury to nerve or blood vessel, pain, need for further surgery.    Operative Course:  The patient was identified in preop holding area.  The left knee was marked as correct.  The patient was taken to the operating room, after adequate anesthesia, antibiotics were infused.  The left leg was sterilely prepped and draped in usual sterile fashion.  Made a small longitudinal incision just medial to the kneecap.  We performed a medial parapatellar arthrotomy.  We evacuated abundant fluid.  This was sent for cultures.  At this point I irrigated the knee with 6 L of normal saline.  I then closed the wound with number 1 Vicryl, skin with 2-0 Vicryl and staples.  A sterile bandage was applied.    The patient was awakened and taken to the recovery room stable condition.  Instrument, needle, sponge counts were correct.  There were no complications.

## 2025-06-15 NOTE — SUBJECTIVE & OBJECTIVE
Interval History:  Patient continuous spikes fevers.  Left knee more swollen/tender.    Review of Systems   Constitutional:  Positive for fever. Negative for chills.   Respiratory:  Negative for shortness of breath.    Cardiovascular:  Negative for chest pain.   Gastrointestinal:  Negative for abdominal pain, nausea and vomiting.   Genitourinary:  Negative for dysuria and frequency.   Musculoskeletal:  Positive for joint swelling.     Objective:     Vital Signs (Most Recent):  Temp: 100.3 °F (37.9 °C) (06/15/25 0359)  Pulse: 102 (06/15/25 0359)  Resp: 17 (06/15/25 0621)  BP: 126/80 (06/15/25 0359)  SpO2: 98 % (06/15/25 0500) Vital Signs (24h Range):  Temp:  [98 °F (36.7 °C)-100.6 °F (38.1 °C)] 100.3 °F (37.9 °C)  Pulse:  [] 102  Resp:  [16-19] 17  SpO2:  [96 %-100 %] 98 %  BP: (120-163)/() 126/80     Weight: 97.5 kg (214 lb 15.2 oz)  Body mass index is 35.77 kg/m².    Intake/Output Summary (Last 24 hours) at 6/15/2025 0724  Last data filed at 6/15/2025 0359  Gross per 24 hour   Intake 400 ml   Output 700 ml   Net -300 ml         Physical Exam  Constitutional:       General: She is not in acute distress.     Appearance: She is ill-appearing.   HENT:      Head: Atraumatic.   Eyes:      Conjunctiva/sclera: Conjunctivae normal.   Cardiovascular:      Rate and Rhythm: Normal rate and regular rhythm.      Heart sounds: Normal heart sounds. No murmur heard.  Pulmonary:      Effort: Pulmonary effort is normal.      Breath sounds: Normal breath sounds. No wheezing.   Abdominal:      General: Bowel sounds are normal. There is no distension.      Palpations: Abdomen is soft.      Tenderness: There is no abdominal tenderness.   Musculoskeletal:         General: Swelling and tenderness present. No deformity. Normal range of motion.      Cervical back: Neck supple.      Comments: Left knee more tender and swollen today, decreased range of motion due to pain   Skin:     Comments: Multiple small areas of induration on  her skin   Neurological:      Mental Status: She is alert and oriented to person, place, and time.               Significant Labs: All pertinent labs within the past 24 hours have been reviewed.    Significant Imaging: I have reviewed all pertinent imaging results/findings within the past 24 hours.

## 2025-06-15 NOTE — ANESTHESIA POSTPROCEDURE EVALUATION
Anesthesia Post Evaluation    Patient: Leslie Gann    Procedure(s) Performed: Procedure(s) (LRB):  IRRIGATION AND DEBRIDEMENT left knee (Left)    Final Anesthesia Type: general      Patient location during evaluation: PACU  Patient participation: Yes- Able to Participate  Level of consciousness: awake and alert  Post-procedure vital signs: reviewed and stable  Pain management: adequate  Airway patency: patent    PONV status at discharge: No PONV  Anesthetic complications: no      Cardiovascular status: blood pressure returned to baseline  Respiratory status: unassisted  Hydration status: euvolemic  Follow-up not needed.              Vitals Value Taken Time   /91 06/15/25 16:10   Temp 36.5 °C (97.7 °F) 06/15/25 16:10   Pulse 98 06/15/25 16:10   Resp 18 06/15/25 16:10   SpO2 100 % 06/15/25 16:10         No case tracking events are documented in the log.      Pain/Louie Score: Pain Rating Prior to Med Admin: 10 (6/15/2025  2:33 PM)  Pain Rating Post Med Admin: 7 (6/15/2025  3:03 PM)

## 2025-06-15 NOTE — ANESTHESIA PREPROCEDURE EVALUATION
06/15/2025  Leslie Gann is a 45 y.o., female.      Pre-op Assessment    I have reviewed the Patient Summary Reports.     I have reviewed the Nursing Notes. I have reviewed the NPO Status.   I have reviewed the Medications.     Review of Systems  Anesthesia Hx:  No problems with previous Anesthesia             Denies Family Hx of Anesthesia complications.    Denies Personal Hx of Anesthesia complications.                    Social:  Non-Smoker Cocaine and herione abuse IV        Hematology/Oncology:    Oncology Normal    -- Anemia:                                  EENT/Dental:  EENT/Dental Normal           Cardiovascular:  Cardiovascular Normal Exercise tolerance: good                                             Pulmonary:  Pulmonary Normal                       Renal/:  Renal/ Normal                 Musculoskeletal:  Musculoskeletal Normal                Neurological:      Headaches Seizures                                Endocrine:  Endocrine Normal            Dermatological:  Skin Normal    Psych:  Psychiatric Normal                       Anesthesia Plan  Type of Anesthesia, risks & benefits discussed:    Anesthesia Type: Gen Supraglottic Airway  Intra-op Monitoring Plan: Standard ASA Monitors  Post Op Pain Control Plan: multimodal analgesia  Induction:  IV  Airway Plan: Video and Direct  Informed Consent: Informed consent signed with the Patient and all parties understand the risks and agree with anesthesia plan.  All questions answered.   ASA Score: 3 Emergent  Day of Surgery Review of History & Physical: H&P Update referred to the surgeon/provider.    Ready For Surgery From Anesthesia Perspective.     .

## 2025-06-15 NOTE — HOSPITAL COURSE
Patient is a 45 year woman with intravenous drug use admitted to the hospital with sepsis secondary to skin soft tissue infections and septic arthritis of her left knee with MRSA bacteremia.  Patient underwent washout of the left knee in the operating room performed by Dr. Juan Alberto Ascencio on 6/15/2025.  Patient cleared her bacteremia.  Transesophageal echocardiogram negative for endocarditis.  MRSA bacteremia was treated with intravenous vancomycin.  Infectious Disease consulted and recommended patient complete treatment with intravenous dalbavancin.  Patient received a dose of dalbavancin in the hospital today followed by additional outpatient doses to be administered at an outpatient infusion center every 2 weeks for 2 additional infusions.  Repeat labs ordered along with close outpatient follow up with [facility] with Dr. Nancy Dalton and Orthopedic Surgery clinic with Dr. Juan Alberto Ascencio advised.  Patient states she is committed to abstaining further intravenous drug use.  Substance abuse treatment recommended.  Referral for addiction Psychiatry placed for substance abuse treatment.  Midline discontinued prior to discharge.     88M CLL (not on treatment), AAA s/p repair, R kidney removal 2/2 kidney CA  p/w weakness, fatigue and fall x 4 days found to have UTI

## 2025-06-15 NOTE — ASSESSMENT & PLAN NOTE
Patient persistent fevers and tachycardia consistent with sepsis with evidence skin soft tissue infection related to intravenous drug use and also with worsening swelling and pain of her left knee concerning for septic arthritis.  Blood cultures positive for Gram-positive cocci resembling Staphylococcus aureus.  PCR testing consistent with MRSA.  Continue vancomycin.  NPO pending evaluation for surgical washout by Orthopedic Surgery.  Morning labs not done as ordered.  Spoke with nursing to ensure orders are followed.

## 2025-06-15 NOTE — PROGRESS NOTES
"[facility] Surg [address] Medicine  Progress Note    Patient Name: Leslie Gann  MRN: [id number]  Patient Class: IP- Inpatient   Admission Date: 6/13/2025  Length of Stay: 1 days  Attending Physician: Trey Sexton MD  Primary Care Provider: Danny Lucio MD        Subjective     Principal Problem:Staphylococcal arthritis of left knee        HPI:  Per Rakesh Morrell, NP:    "The patient is a 45 y.o. female with medical history of IV drug abuse (last injected 1 hour prior to coming to the ED), recent shingles infection, and leg wounds who presents with bilateral leg pain with associated redness and erythema.  Patient states she does "inject dope and cocaine and has multiple abscesses due to injection sites.  Patient states increased pain in her right knee with difficulty ambulating.  Patient endorsing fever and chills.  No nausea or vomiting.  CT positive for small abscess to right femoral medial condyle and various areas of subq air likely due to injection sites.  She will be admitted for further management of her right knee abscess."    Overview/Hospital Course:  Patient is a 45 year woman with and intravenous drug use presented with diffuse body pain involving multiple areas of redness erythema along with left knee pain.    Patient with evidence of sepsis secondary to skin soft tissue infection and also now concern for left knee infection as left knee more painful and swollen.  Blood cultures positive for Staphylococcus aureus (PCR testing consistent with MRSA).    NPO.  Adjusted pain medication for better pain control.  Orthopedic surgery consulted for evaluation aspiration and joint/washout.    Interval History:  Patient continuous spikes fevers.  Left knee more swollen/tender.    Review of Systems   Constitutional:  Positive for fever. Negative for chills.   Respiratory:  Negative for shortness of breath.    Cardiovascular:  Negative for chest pain.   Gastrointestinal:  " Negative for abdominal pain, nausea and vomiting.   Genitourinary:  Negative for dysuria and frequency.   Musculoskeletal:  Positive for joint swelling.     Objective:     Vital Signs (Most Recent):  Temp: 100.3 °F (37.9 °C) (06/15/25 0359)  Pulse: 102 (06/15/25 0359)  Resp: 17 (06/15/25 0621)  BP: 126/80 (06/15/25 0359)  SpO2: 98 % (06/15/25 0500) Vital Signs (24h Range):  Temp:  [98 °F (36.7 °C)-100.6 °F (38.1 °C)] 100.3 °F (37.9 °C)  Pulse:  [] 102  Resp:  [16-19] 17  SpO2:  [96 %-100 %] 98 %  BP: (120-163)/() 126/80     Weight: 97.5 kg (214 lb 15.2 oz)  Body mass index is 35.77 kg/m².    Intake/Output Summary (Last 24 hours) at 6/15/2025 0724  Last data filed at 6/15/2025 0359  Gross per 24 hour   Intake 400 ml   Output 700 ml   Net -300 ml         Physical Exam  Constitutional:       General: She is not in acute distress.     Appearance: She is ill-appearing.   HENT:      Head: Atraumatic.   Eyes:      Conjunctiva/sclera: Conjunctivae normal.   Cardiovascular:      Rate and Rhythm: Normal rate and regular rhythm.      Heart sounds: Normal heart sounds. No murmur heard.  Pulmonary:      Effort: Pulmonary effort is normal.      Breath sounds: Normal breath sounds. No wheezing.   Abdominal:      General: Bowel sounds are normal. There is no distension.      Palpations: Abdomen is soft.      Tenderness: There is no abdominal tenderness.   Musculoskeletal:         General: Swelling and tenderness present. No deformity. Normal range of motion.      Cervical back: Neck supple.      Comments: Left knee more tender and swollen today, decreased range of motion due to pain   Skin:     Comments: Multiple small areas of induration on her skin   Neurological:      Mental Status: She is alert and oriented to person, place, and time.               Significant Labs: All pertinent labs within the past 24 hours have been reviewed.    Significant Imaging: I have reviewed all pertinent imaging results/findings within  the past 24 hours.      Assessment & Plan  Staphylococcal arthritis of left knee  Sepsis  Methicillin resistant Staphylococcus aureus infection  Bacteremia due to methicillin resistant Staphylococcus aureus  Intravenous drug abuse, continuous  Opioid use disorder  Patient persistent fevers and tachycardia consistent with sepsis with evidence skin soft tissue infection related to intravenous drug use and also with worsening swelling and pain of her left knee concerning for septic arthritis.  Blood cultures positive for Gram-positive cocci resembling Staphylococcus aureus.  PCR testing consistent with MRSA.  Continue vancomycin.  NPO pending evaluation for surgical washout by Orthopedic Surgery.  Morning labs not done as ordered.  Spoke with nursing to ensure orders are followed.  Diarrhea  Possibly related to opioid withdrawal.  No diarrhea here since admission.  Discontinue C. diff testing if no diarrhea for 48 hours.  DVT prophylaxis.  Embolic Deterrent hose (ADRIAN® hose) and sequential compression devices for now   pending possible surgery.      Trey Sexton MD  Department of Hospital Medicine   [facility] - Med Surg ([address])

## 2025-06-15 NOTE — ANESTHESIA PROCEDURE NOTES
Intubation    Date/Time: 6/15/2025 3:43 PM    Performed by: Karey Johnston CRNA  Authorized by: Callum Aguilar MD    Intubation:     Induction:  Intravenous    Intubated:  Postinduction    Mask Ventilation:  N/a    Attempts:  1    Attempted By:  CRNA    Difficult Airway Encountered?: No      Complications:  None    Airway Device:  Supraglottic airway/LMA    Airway Device Size:  4.0    Style/Cuff Inflation:  Cuffed (inflated to minimal occlusive pressure)    Secured at:  The lips    Placement Verified By:  Capnometry    Complicating Factors:  None    Findings Post-Intubation:  BS equal bilateral and atraumatic/condition of teeth unchanged

## 2025-06-16 PROBLEM — E66.9 OBESITY: Status: ACTIVE | Noted: 2025-06-16

## 2025-06-16 PROBLEM — D64.9 ANEMIA: Status: ACTIVE | Noted: 2025-06-16

## 2025-06-16 LAB
ABSOLUTE EOSINOPHIL (OHS): 0 K/UL
ABSOLUTE MONOCYTE (OHS): 0.28 K/UL (ref 0.3–1)
ABSOLUTE NEUTROPHIL COUNT (OHS): 5.28 K/UL (ref 1.8–7.7)
ANION GAP (OHS): 10 MMOL/L (ref 8–16)
AORTIC SIZE INDEX (SOV): 1.6 CM/M2
AORTIC SIZE INDEX: 1.4 CM/M2
AORTIC VALVE MEAN VELOCITY: 1.24 M/S
ASCENDING AORTA: 2.9 CM
AV INDEX (PROSTH): 0.82
AV LVOT MEAN GRADIENT: 4 MMHG
AV LVOT PEAK GRADIENT: 8 MMHG
AV MEAN GRADIENT: 7 MMHG
AV PEAK GRADIENT: 13 MMHG
AV VALVE AREA BY VELOCITY RATIO: 2.4 CM²
AV VALVE AREA: 2.6 CM²
AV VELOCITY RATIO: 0.78
BASOPHILS # BLD AUTO: 0.01 K/UL
BASOPHILS NFR BLD AUTO: 0.1 %
BSA FOR ECHO PROCEDURE: 2.11 M2
BUN SERPL-MCNC: 16 MG/DL (ref 6–20)
CALCIUM SERPL-MCNC: 9.3 MG/DL (ref 8.7–10.5)
CHLORIDE SERPL-SCNC: 109 MMOL/L (ref 95–110)
CO2 SERPL-SCNC: 18 MMOL/L (ref 23–29)
CREAT SERPL-MCNC: 1 MG/DL (ref 0.5–1.4)
CV ECHO LV RWT: 0.34 CM
DOP CALC AO PEAK VEL: 1.8 M/S
DOP CALC AO VTI: 32.1 CM
DOP CALC LVOT AREA: 3.1 CM2
DOP CALC LVOT DIAMETER: 2 CM
DOP CALC LVOT PEAK VEL: 1.4 M/S
DOP CALC LVOT STROKE VOLUME: 82.9 CM3
DOP CALC RVOT PEAK VEL: 0.7 M/S
DOP CALCLVOT PEAK VEL VTI: 26.4 CM
E WAVE DECELERATION TIME: 182 MSEC
E/A RATIO: 1.39
E/E' RATIO: 9 M/S
ECHO LV POSTERIOR WALL: 0.8 CM (ref 0.6–1.1)
EJECTION FRACTION: 65 %
ERYTHROCYTE [DISTWIDTH] IN BLOOD BY AUTOMATED COUNT: 15.5 % (ref 11.5–14.5)
FRACTIONAL SHORTENING: 42.6 % (ref 28–44)
GFR SERPLBLD CREATININE-BSD FMLA CKD-EPI: >60 ML/MIN/1.73/M2
GLOBAL LONGITUIDAL STRAIN: 18.2 %
GLUCOSE SERPL-MCNC: 161 MG/DL (ref 70–110)
HCT VFR BLD AUTO: 33.4 % (ref 37–48.5)
HGB BLD-MCNC: 10.4 GM/DL (ref 12–16)
IMM GRANULOCYTES # BLD AUTO: 0.02 K/UL (ref 0–0.04)
IMM GRANULOCYTES NFR BLD AUTO: 0.3 % (ref 0–0.5)
INTERVENTRICULAR SEPTUM: 0.9 CM (ref 0.6–1.1)
IVC DIAMETER: 1.5 CM
LA MAJOR: 4.8 CM
LA MINOR: 5.6 CM
LA WIDTH: 3.5 CM
LEFT ATRIUM SIZE: 3.2 CM
LEFT ATRIUM VOLUME INDEX MOD: 25 ML/M2
LEFT ATRIUM VOLUME INDEX: 24 ML/M2
LEFT ATRIUM VOLUME MOD: 51 ML
LEFT ATRIUM VOLUME: 49 CM3
LEFT INTERNAL DIMENSION IN SYSTOLE: 2.7 CM (ref 2.1–4)
LEFT VENTRICLE MASS INDEX: 64.9 G/M2
LEFT VENTRICULAR INTERNAL DIMENSION IN DIASTOLE: 4.7 CM (ref 3.5–6)
LEFT VENTRICULAR MASS: 132.3 G
LV LATERAL E/E' RATIO: 6.9 M/S
LV SEPTAL E/E' RATIO: 13.9 M/S
LVED V (TEICH): 100 ML
LVES V (TEICH): 26 ML
LYMPHOCYTES # BLD AUTO: 2.04 K/UL (ref 1–4.8)
MAGNESIUM SERPL-MCNC: 2.3 MG/DL (ref 1.6–2.6)
MCH RBC QN AUTO: 26.6 PG (ref 27–31)
MCHC RBC AUTO-ENTMCNC: 31.1 G/DL (ref 32–36)
MCV RBC AUTO: 85 FL (ref 82–98)
MV PEAK A VEL: 0.8 M/S
MV PEAK E VEL: 1.11 M/S
NUCLEATED RBC (/100WBC) (OHS): 0 /100 WBC
OHS CV RV/LV RATIO: 0.85 CM
OHS QRS DURATION: 78 MS
OHS QTC CALCULATION: 401 MS
PHOSPHATE SERPL-MCNC: 3.7 MG/DL (ref 2.7–4.5)
PISA TR MAX VEL: 2.7 M/S
PLATELET # BLD AUTO: 417 K/UL (ref 150–450)
PMV BLD AUTO: 8.6 FL (ref 9.2–12.9)
POTASSIUM SERPL-SCNC: 4.7 MMOL/L (ref 3.5–5.1)
PV PEAK GRADIENT: 5 MMHG
PV PEAK VELOCITY: 1.07 M/S
RA MAJOR: 4.5 CM
RA PRESSURE ESTIMATED: 3 MMHG
RA WIDTH: 3.8 CM
RBC # BLD AUTO: 3.91 M/UL (ref 4–5.4)
RELATIVE EOSINOPHIL (OHS): 0 %
RELATIVE LYMPHOCYTE (OHS): 26.7 % (ref 18–48)
RELATIVE MONOCYTE (OHS): 3.7 % (ref 4–15)
RELATIVE NEUTROPHIL (OHS): 69.2 % (ref 38–73)
RIGHT VENTRICLAR OUTFLOW TRACT PEAK GRADIENT: 2.2 MMHG
RIGHT VENTRICLE DIASTOLIC BASEL DIMENSION: 4 CM
RV TB RVSP: 6 MMHG
SINUS: 3.2 CM
SODIUM SERPL-SCNC: 137 MMOL/L (ref 136–145)
STJ: 2.8 CM
TDI LATERAL: 0.16 M/S
TDI SEPTAL: 0.08 M/S
TDI: 0.12 M/S
TRICUSPID ANNULAR PLANE SYSTOLIC EXCURSION: 1.8 CM
TV REST PULMONARY ARTERY PRESSURE: 32 MMHG
VANCOMYCIN TROUGH SERPL-MCNC: 20.5 UG/ML (ref 10–22)
WBC # BLD AUTO: 7.63 K/UL (ref 3.9–12.7)
Z-SCORE OF LEFT VENTRICULAR DIMENSION IN END DIASTOLE: -2.58
Z-SCORE OF LEFT VENTRICULAR DIMENSION IN END SYSTOLE: -2.55

## 2025-06-16 PROCEDURE — 80202 ASSAY OF VANCOMYCIN: CPT | Performed by: HOSPITALIST

## 2025-06-16 PROCEDURE — 80048 BASIC METABOLIC PNL TOTAL CA: CPT | Performed by: HOSPITALIST

## 2025-06-16 PROCEDURE — 21400001 HC TELEMETRY ROOM

## 2025-06-16 PROCEDURE — 63600175 PHARM REV CODE 636 W HCPCS: Performed by: HOSPITALIST

## 2025-06-16 PROCEDURE — 99231 SBSQ HOSP IP/OBS SF/LOW 25: CPT | Mod: ,,, | Performed by: SPECIALIST

## 2025-06-16 PROCEDURE — 25000003 PHARM REV CODE 250: Performed by: HOSPITALIST

## 2025-06-16 PROCEDURE — 84100 ASSAY OF PHOSPHORUS: CPT | Performed by: HOSPITALIST

## 2025-06-16 PROCEDURE — 83735 ASSAY OF MAGNESIUM: CPT | Performed by: HOSPITALIST

## 2025-06-16 PROCEDURE — 25000003 PHARM REV CODE 250: Performed by: NURSE PRACTITIONER

## 2025-06-16 PROCEDURE — 36415 COLL VENOUS BLD VENIPUNCTURE: CPT | Performed by: HOSPITALIST

## 2025-06-16 PROCEDURE — 27000207 HC ISOLATION

## 2025-06-16 PROCEDURE — 85025 COMPLETE CBC W/AUTO DIFF WBC: CPT | Performed by: HOSPITALIST

## 2025-06-16 PROCEDURE — 87040 BLOOD CULTURE FOR BACTERIA: CPT | Performed by: HOSPITALIST

## 2025-06-16 RX ORDER — FLUCONAZOLE 150 MG/1
150 TABLET ORAL ONCE
Status: COMPLETED | OUTPATIENT
Start: 2025-06-16 | End: 2025-06-16

## 2025-06-16 RX ADMIN — MUPIROCIN: 20 OINTMENT TOPICAL at 09:06

## 2025-06-16 RX ADMIN — MORPHINE SULFATE 6 MG: 2 INJECTION, SOLUTION INTRAMUSCULAR; INTRAVENOUS at 03:06

## 2025-06-16 RX ADMIN — MORPHINE SULFATE 6 MG: 2 INJECTION, SOLUTION INTRAMUSCULAR; INTRAVENOUS at 10:06

## 2025-06-16 RX ADMIN — FLUCONAZOLE 150 MG: 150 TABLET ORAL at 02:06

## 2025-06-16 RX ADMIN — GABAPENTIN 300 MG: 300 CAPSULE ORAL at 02:06

## 2025-06-16 RX ADMIN — GABAPENTIN 300 MG: 300 CAPSULE ORAL at 09:06

## 2025-06-16 RX ADMIN — GABAPENTIN 300 MG: 300 CAPSULE ORAL at 08:06

## 2025-06-16 RX ADMIN — MUPIROCIN: 20 OINTMENT TOPICAL at 08:06

## 2025-06-16 RX ADMIN — SODIUM CHLORIDE, POTASSIUM CHLORIDE, SODIUM LACTATE AND CALCIUM CHLORIDE: 600; 310; 30; 20 INJECTION, SOLUTION INTRAVENOUS at 11:06

## 2025-06-16 RX ADMIN — VANCOMYCIN HYDROCHLORIDE 1500 MG: 1.5 INJECTION, POWDER, LYOPHILIZED, FOR SOLUTION INTRAVENOUS at 02:06

## 2025-06-16 RX ADMIN — MORPHINE SULFATE 6 MG: 2 INJECTION, SOLUTION INTRAMUSCULAR; INTRAVENOUS at 11:06

## 2025-06-16 RX ADMIN — OXYCODONE HYDROCHLORIDE 10 MG: 10 TABLET ORAL at 02:06

## 2025-06-16 RX ADMIN — VALACYCLOVIR HYDROCHLORIDE 1000 MG: 500 TABLET, FILM COATED ORAL at 08:06

## 2025-06-16 RX ADMIN — VALACYCLOVIR HYDROCHLORIDE 1000 MG: 500 TABLET, FILM COATED ORAL at 09:06

## 2025-06-16 RX ADMIN — VANCOMYCIN HYDROCHLORIDE 1500 MG: 1.5 INJECTION, POWDER, LYOPHILIZED, FOR SOLUTION INTRAVENOUS at 01:06

## 2025-06-16 NOTE — PROGRESS NOTES
"[facility] Surg [address] Medicine  Progress Note    Patient Name: Leslie Gann  MRN: [id number]  Patient Class: IP- Inpatient   Admission Date: 6/13/2025  Length of Stay: 2 days  Attending Physician: [place] Iyer MD  Primary Care Provider: Danny Lucio MD        Subjective     Principal Problem:Staphylococcal arthritis of left knee        HPI:  Per Rakesh Morrell, NP:    "The patient is a 45 y.o. female with medical history of IV drug abuse (last injected 1 hour prior to coming to the ED), recent shingles infection, and leg wounds who presents with bilateral leg pain with associated redness and erythema.  Patient states she does "inject dope and cocaine and has multiple abscesses due to injection sites.  Patient states increased pain in her right knee with difficulty ambulating.  Patient endorsing fever and chills.  No nausea or vomiting.  CT positive for small abscess to right femoral medial condyle and various areas of subq air likely due to injection sites.  She will be admitted for further management of her right knee abscess."    Overview/Hospital Course:  Patient is a 45 year woman with and intravenous drug use presented with diffuse body pain involving multiple areas of redness erythema along with left knee pain.    Patient with evidence of sepsis secondary to skin soft tissue infection and also now concern for left knee infection as left knee more painful and swollen.  Blood cultures positive for Staphylococcus aureus (PCR testing consistent with MRSA).    NPO.  Adjusted pain medication for better pain control.  Orthopedic surgery consulted for evaluation aspiration and joint/washout.    Interval History:  No acute events overnight.  Afebrile since 6/14 at 8:13 AM.  States she is feeling much better and eager to go home.  Pain is well managed at this time.  Reports desire to do outpatient substance treatment once discharged.    Objective:     Vital Signs (Most " Recent):  Temp: 98 °F (36.7 °C) (06/16/25 1131)  Pulse: 101 (06/16/25 1131)  Resp: 20 (06/16/25 1131)  BP: 108/65 (06/16/25 1131)  SpO2: 97 % (06/16/25 1131) Vital Signs (24h Range):  Temp:  [97.7 °F (36.5 °C)-98.3 °F (36.8 °C)] 98 °F (36.7 °C)  Pulse:  [] 101  Resp:  [14-20] 20  SpO2:  [95 %-100 %] 97 %  BP: (108-165)/(65-96) 108/65     Weight: 97.5 kg (214 lb 15.2 oz)  Body mass index is 35.77 kg/m².    Intake/Output Summary (Last 24 hours) at 6/16/2025 1311  Last data filed at 6/15/2025 2309  Gross per 24 hour   Intake 1750 ml   Output 1950 ml   Net -200 ml         Physical Exam  Constitutional:       General: She is not in acute distress.     Appearance: She is obese. She is ill-appearing. She is not toxic-appearing.   HENT:      Head: Atraumatic.      Mouth/Throat:      Mouth: Mucous membranes are moist.   Eyes:      Extraocular Movements: Extraocular movements intact.   Cardiovascular:      Rate and Rhythm: Regular rhythm. Tachycardia present.      Heart sounds: Normal heart sounds. No murmur heard.  Pulmonary:      Effort: Pulmonary effort is normal.      Breath sounds: Normal breath sounds. No wheezing.   Abdominal:      General: Bowel sounds are normal. There is no distension.      Palpations: Abdomen is soft.      Tenderness: There is no abdominal tenderness.   Musculoskeletal:         General: Swelling and tenderness present. No deformity. Normal range of motion.      Cervical back: Normal range of motion.      Comments: Left knee in post-surgical dressing   Skin:     Comments: Multiple small areas of induration on her skin   Neurological:      Mental Status: She is alert and oriented to person, place, and time. Mental status is at baseline.   Psychiatric:         Mood and Affect: Mood normal.         Behavior: Behavior normal.               Significant Labs: All pertinent labs within the past 24 hours have been reviewed.    Significant Imaging: I have reviewed all pertinent imaging results/findings  within the past 24 hours.      Assessment & Plan  Staphylococcal arthritis of left knee  Sepsis  Methicillin resistant Staphylococcus aureus infection  Bacteremia due to methicillin resistant Staphylococcus aureus  Intravenous drug abuse, continuous  Opioid use disorder  -Admitted to inpatient status  -Presented with fevers and left knee pain.  Noted history of IVDA  -Blood cultures growing MRSA -   -Source likely related there her IV drug use and septic left knee  -Orthopedic surgery consulted and input appreciated.  She was taken for irrigation and debridement / arthrotomy of her left knee on 6/15.  -She is feeling much better today.  Remains afebrile since 6/14 at 8?23 AM.  -Repeat blood cultures today for clearance.  Check TTE.  -Consult infectious disease.  Unclear if she will be candidate for outpatient iv antibiotics?  Continue vancomycin for now.  -Counseled on importance of complete abstinence from illicit drugs.  UDS positive for cocaine, opiates and THC.  Patient wishes to pursue outpatient treatment upon discharge.  Diarrhea  -Reported on admit, but has had no diarrhea here since admission.    Anemia  -Hb 9.7 on admit and now 10.4  -No evidence of bleeding  -Repeat cbc in AM  Obesity  Body mass index is 35.77 kg/m². Morbid obesity complicates all aspects of disease management from diagnostic modalities to treatment. Weight loss encouraged and health benefits explained to patient.  VTE Risk Mitigation (From admission, onward)           Ordered     IP VTE HIGH RISK PATIENT  Once         06/14/25 0224     Place sequential compression device  Until discontinued         06/14/25 0224     Reason for No Pharmacological VTE Prophylaxis  Once        Question:  Reasons:  Answer:  Risk of Bleeding    06/14/25 0224                    Discharge Planning   LIVIER:      Code Status: Full Code   Medical Readiness for Discharge Date:                            [place] Iyer MD  Department of Hospital Medicine   [facility]  Med Surg ([address])

## 2025-06-16 NOTE — PLAN OF CARE
Infectious Diseases Plan of Care Note    Chart reviewed only. Undergoing TTE, declined evaluation during.     Blood cultures with MRSA from 6/13, underwent I&D of knee with orthopedics due to concern for septic joint. Cultures pending. TTE without IE. Repeat blood cultures are pending from 6/16. Will need at least 6 week of IV antibiotics given septic arthritis.    Will need a CITLALY given evidence of disseminated infection   Continue vancomycin  Will follow blood cultures     Nancy Dalton MD  Infectious Diseases Staff

## 2025-06-16 NOTE — PLAN OF CARE
Patient is eager to go home but requires continued medical care       06/16/25 1629   Rounds   Attendance Provider;Nurse    Discharge Plan A Home with family  (pending PT/OT consult)   Why the patient remains in the hospital Requires continued medical care   Transition of Care Barriers Substance Abuse

## 2025-06-16 NOTE — PLAN OF CARE
Plan of care reviewed with patient.  Patient verbalized understanding and had no further questions.  Patient continues to receive IV antibiotics throughout the shift.  Patient's pain controlled with IV analgesics.  Patient continues to refuse telemetry monitor due to pain.  Patient educated on importance of telemetry monitor.  Patient now resting comfortably in bed locked in lowest position, side rails up x3, and call bell in reach.  Will continue to monitor.       Problem: Adult Inpatient Plan of Care  Goal: Plan of Care Review  Outcome: Ongoing  Goal: Patient-Specific Goal (Individualized)  Outcome: Ongoing  Goal: Absence of Hospital-Acquired Illness or Injury  Outcome: Ongoing  Goal: Optimal Comfort and Wellbeing  Outcome: Ongoing  Goal: Readiness for Transition of Care  Outcome: Ongoing     Problem: Infection  Goal: Absence of Infection Signs and Symptoms  Outcome: Ongoing     Problem: Sepsis/Septic Shock  Goal: Optimal Coping  Outcome: Ongoing  Goal: Absence of Bleeding  Outcome: Ongoing  Goal: Blood Glucose Level Within Targeted Range  Outcome: Ongoing  Goal: Absence of Infection Signs and Symptoms  Outcome: Ongoing  Goal: Optimal Nutrition Intake  Outcome: Ongoing     Problem: Wound  Goal: Optimal Coping  Outcome: Ongoing  Goal: Optimal Functional Ability  Outcome: Ongoing  Goal: Absence of Infection Signs and Symptoms  Outcome: Ongoing  Goal: Improved Oral Intake  Outcome: Ongoing  Goal: Optimal Pain Control and Function  Outcome: Ongoing  Goal: Skin Health and Integrity  Outcome: Ongoing  Goal: Optimal Wound Healing  Outcome: Ongoing     Problem: Fall Injury Risk  Goal: Absence of Fall and Fall-Related Injury  Outcome: Ongoing

## 2025-06-16 NOTE — PROGRESS NOTES
House day 4.  Diagnosis-sepsis, IVDA, cellulitis lower extremities, left knee effusion    Subjective   Feeling better  No fever, chills, diaphoresis    PE   Afebrile  Vital signs stable  Lower extremities-cellulitis with some areas of nodularity, no fluctuance    Impression/plan   No obvious need for drainage or debridement identifiable at this time  We will continue to follow

## 2025-06-16 NOTE — PLAN OF CARE
Case Management Assessment     PCP: Cailin Shirley NP  Pharmacy: at bedside    Patient Arrived From: home  Existing Help at Home: family     Barriers to Discharge: none    Discharge Plan:    A. Home with family   B. Home with family      Patient is AAOx3 and independent at baseline. Patient lives with son and parents who are her natural support. PCP is Adali Shirley. Patient will need a lyft home. CM will continue to follow    [facility] ([address])  Initial Discharge Assessment       Primary Care Provider: Danny Lucio MD    Admission Diagnosis: Screening for cardiovascular condition [Z13.6]  Cellulitis of left lower extremity [L03.116]  Sepsis due to cellulitis [L03.90, A41.9]    Admission Date: 6/13/2025  Expected Discharge Date:     Transition of Care Barriers: (P) Substance Abuse    Payor: [facility] CONNECTIONS / [facility] / Product Type: Commercial /     Extended Emergency Contact Information  Primary Emergency Contact: Antoinette Gann  Address: [address] [place] [address]  Home Phone: [phone]  Relation: Mother    Discharge Plan A: (P) Home with family  Discharge Plan B: (P) Home with family      [id number] - [place], [place] - [address] AT [address] [place] [address]  Phone: [phone] Fax: [phone]    [facility] - [place], [place] - [address] [facility]  [address] [facility] [address]  Phone: [phone] Fax: [phone]      Initial Assessment (most recent)       Adult Discharge Assessment - 06/16/25 1539          Discharge Assessment    Assessment Type Discharge Planning Assessment (P)      Confirmed/corrected address, phone number and insurance Yes (P)      Confirmed Demographics Correct on Facesheet (P)      Source of Information patient (P)      Communicated LIVIER with  patient/caregiver Date not available/Unable to determine (P)      People in Home child(jazz), dependent;parent(s) (P)      Name(s) of People in Home Antoinette Gann (Mother)  [phone] (Home Phone) (P)      Facility Arrived From: home (P)      Do you expect to return to your current living situation? Yes (P)      Do you have help at home or someone to help you manage your care at home? Yes (P)      Who are your caregiver(s) and their phone number(s)? Antoinette Gann (Mother)  [phone] (Home Phone) (P)      Prior to hospitilization cognitive status: Alert/Oriented (P)      Current cognitive status: Alert/Oriented (P)      Walking or Climbing Stairs Difficulty no (P)      Dressing/Bathing Difficulty no (P)      Equipment Currently Used at Home none (P)      Readmission within 30 days? No (P)      Patient currently being followed by outpatient case management? No (P)      Do you currently have service(s) that help you manage your care at home? No (P)      Do you take prescription medications? Yes (P)      Do you have prescription coverage? Yes (P)      Do you have any problems affording any of your prescribed medications? No (P)      Is the patient taking medications as prescribed? yes (P)      Who is going to help you get home at discharge? family or will need a lyft (P)      How do you get to doctors appointments? family or friend will provide;car, drives self (P)      Are you on dialysis? No (P)      Do you take coumadin? No (P)      Discharge Plan A Home with family (P)      Discharge Plan B Home with family (P)      DME Needed Upon Discharge  none (P)      Discharge Plan discussed with: Patient (P)      Transition of Care Barriers Substance Abuse (P)         Physical Activity    On average, how many days per week do you engage in moderate to strenuous exercise (like a brisk walk)? 0 days (P)      On average, how many minutes do you engage in exercise at this level? 0 min (P)         Financial Resource Strain     How hard is it for you to pay for the very basics like food, housing, medical care, and heating? Somewhat hard (P)         Housing Stability    In the last 12 months, was there a time when you were not able to pay the mortgage or rent on time? No (P)      At any time in the past 12 months, were you homeless or living in a shelter (including now)? No (P)         Transportation Needs    In the past 12 months, has lack of transportation kept you from medical appointments or from getting medications? No (P)      In the past 12 months, has lack of transportation kept you from meetings, work, or from getting things needed for daily living? No (P)         Food Insecurity    Within the past 12 months, you worried that your food would run out before you got the money to buy more. Sometimes true (P)      Within the past 12 months, the food you bought just didn't last and you didn't have money to get more. Sometimes true (P)         Stress    Do you feel stress - tense, restless, nervous, or anxious, or unable to sleep at night because your mind is troubled all the time - these days? Very much (P)         Social Isolation    How often do you feel lonely or isolated from those around you?  Sometimes (P)         Alcohol Use    Q1: How often do you have a drink containing alcohol? Never (P)      Q2: How many drinks containing alcohol do you have on a typical day when you are drinking? Patient does not drink (P)      Q3: How often do you have six or more drinks on one occasion? Never (P)         Utilities    In the past 12 months has the electric, gas, oil, or water company threatened to shut off services in your home? Yes (P)         Health Literacy    How often do you need to have someone help you when you read instructions, pamphlets, or other written material from your doctor or pharmacy? Never (P)

## 2025-06-16 NOTE — SUBJECTIVE & OBJECTIVE
Interval History:  No acute events overnight.  Afebrile since 6/14 at 8:13 AM.  States she is feeling much better and eager to go home.  Pain is well managed at this time.  Reports desire to do outpatient substance treatment once discharged.    Objective:     Vital Signs (Most Recent):  Temp: 98 °F (36.7 °C) (06/16/25 1131)  Pulse: 101 (06/16/25 1131)  Resp: 20 (06/16/25 1131)  BP: 108/65 (06/16/25 1131)  SpO2: 97 % (06/16/25 1131) Vital Signs (24h Range):  Temp:  [97.7 °F (36.5 °C)-98.3 °F (36.8 °C)] 98 °F (36.7 °C)  Pulse:  [] 101  Resp:  [14-20] 20  SpO2:  [95 %-100 %] 97 %  BP: (108-165)/(65-96) 108/65     Weight: 97.5 kg (214 lb 15.2 oz)  Body mass index is 35.77 kg/m².    Intake/Output Summary (Last 24 hours) at 6/16/2025 1311  Last data filed at 6/15/2025 2309  Gross per 24 hour   Intake 1750 ml   Output 1950 ml   Net -200 ml         Physical Exam  Constitutional:       General: She is not in acute distress.     Appearance: She is obese. She is ill-appearing. She is not toxic-appearing.   HENT:      Head: Atraumatic.      Mouth/Throat:      Mouth: Mucous membranes are moist.   Eyes:      Extraocular Movements: Extraocular movements intact.   Cardiovascular:      Rate and Rhythm: Regular rhythm. Tachycardia present.      Heart sounds: Normal heart sounds. No murmur heard.  Pulmonary:      Effort: Pulmonary effort is normal.      Breath sounds: Normal breath sounds. No wheezing.   Abdominal:      General: Bowel sounds are normal. There is no distension.      Palpations: Abdomen is soft.      Tenderness: There is no abdominal tenderness.   Musculoskeletal:         General: Swelling and tenderness present. No deformity. Normal range of motion.      Cervical back: Normal range of motion.      Comments: Left knee in post-surgical dressing   Skin:     Comments: Multiple small areas of induration on her skin   Neurological:      Mental Status: She is alert and oriented to person, place, and time. Mental status  is at baseline.   Psychiatric:         Mood and Affect: Mood normal.         Behavior: Behavior normal.               Significant Labs: All pertinent labs within the past 24 hours have been reviewed.    Significant Imaging: I have reviewed all pertinent imaging results/findings within the past 24 hours.

## 2025-06-16 NOTE — ASSESSMENT & PLAN NOTE
Body mass index is 35.77 kg/m². Morbid obesity complicates all aspects of disease management from diagnostic modalities to treatment. Weight loss encouraged and health benefits explained to patient.

## 2025-06-16 NOTE — ASSESSMENT & PLAN NOTE
-Admitted to inpatient status  -Presented with fevers and left knee pain.  Noted history of IVDA  -Blood cultures growing MRSA -   -Source likely related there her IV drug use and septic left knee  -Orthopedic surgery consulted and input appreciated.  She was taken for irrigation and debridement / arthrotomy of her left knee on 6/15.  -She is feeling much better today.  Remains afebrile since 6/14 at 8?23 AM.  -Repeat blood cultures today for clearance.  Check TTE.  -Consult infectious disease.  Unclear if she will be candidate for outpatient iv antibiotics?  Continue vancomycin for now.  -Counseled on importance of complete abstinence from illicit drugs.  UDS positive for cocaine, opiates and THC.  Patient wishes to pursue outpatient treatment upon discharge.

## 2025-06-16 NOTE — PROGRESS NOTES
Pharmacokinetic Assessment Follow Up: IV Vancomycin    Vancomycin serum concentration assessment(s):    The trough level was drawn correctly and can be used to guide therapy at this time. The measurement is within the desired definitive target range of 10 to 20 mcg/mL.    Vancomycin Regimen Plan:    Change regimen to Vancomycin 1500 mg IV every 12 hours with next serum trough concentration measured at 1300 prior to 4th dose on 6/17    Drug levels (last 3 results):  Recent Labs   Lab Result Units 06/16/25  0008   Vancomycin Trough ug/ml 20.5       Pharmacy will continue to follow and monitor vancomycin.    Please contact pharmacy at extension [phone] for questions regarding this assessment.    Thank you for the consult,   Mikaela Sheffield       Patient brief summary:  Leslie Gann is a 45 y.o. female initiated on antimicrobial therapy with IV Vancomycin for treatment of Septic Arthritis     The patient's current regimen is 1750 mg q12h    Drug Allergies:   Review of patient's allergies indicates:  No Known Allergies    Actual Body Weight:   97.5 kg    Renal Function:   Estimated Creatinine Clearance: 90.3 mL/min (based on SCr of 0.9 mg/dL).,     Dialysis Method (if applicable):  N/A    CBC (last 72 hours):  Recent Labs   Lab Result Units 06/13/25  1746 06/15/25  0803   WBC K/uL 8.21 8.71   HGB gm/dL 12.4 9.7*   HCT % 39.9 29.9*   Platelet Count K/uL 378 367   Lymph % % 29.8 45.9   Mono % % 9.5 12.2   Eos % % 0.2 3.1   Basophil % % 0.5 0.3       Metabolic Panel (last 72 hours):  Recent Labs   Lab Result Units 06/13/25  1746 06/13/25  1908 06/15/25  0803   Sodium mmol/L 136  --  140   Potassium mmol/L 3.9  --  4.1   Chloride mmol/L 105  --  109   CO2 mmol/L 17*  --  22*   Glucose mg/dL 88  --  116*   Glucose, UA   --  Negative  --    BUN mg/dL 9  --  8   Creatinine mg/dL 0.8  --  0.9   Urine Creatinine mg/dL  --  67.4  --    Albumin g/dL 3.0*  --   --    Bilirubin Total mg/dL 0.3  --   --    ALP unit/L 157*  --    --    AST unit/L 28  --   --    ALT unit/L 21  --   --    Magnesium  mg/dL 2.1  --  2.1   Phosphorus Level mg/dL  --   --  3.2       Vancomycin Administrations:  vancomycin given in the last 96 hours                     vancomycin 1,750 mg in 0.9% NaCl 500 mL IVPB (admixture device) (mg) 1,750 mg New Bag 06/15/25 1130     1,750 mg New Bag 06/14/25 2348     1,750 mg New Bag  1000    vancomycin 2 g in 0.9% sodium chloride 500 mL IVPB (mg) 2,000 mg New Bag 06/13/25 2008                    Microbiologic Results:  Microbiology Results (last 7 days)       Procedure Component Value Units Date/Time    Aerobic culture [[id number]] Collected: 06/15/25 0822    Order Status: Sent Specimen: Aspirate from Knee, Left Updated: 06/15/25 1812    Culture, Body Fluid (Aerobic) w/ GS [[id number]] Collected: 06/15/25 1555    Order Status: Sent Specimen: Body Fluid from Knee, Left Updated: 06/15/25 1626    Aerobic culture [[id number]] Collected: 06/15/25 1555    Order Status: Sent Specimen: Body Fluid from Knee, Left     Blood culture x two cultures. Draw prior to antibiotics. [[id number]]  (Abnormal) Collected: 06/13/25 1748    Order Status: Completed Specimen: Blood from Peripheral, Upper Arm, Left Updated: 06/15/25 1204     Blood Culture Positive - Aerobic/Pediatric Bottle     GRAM STAIN Gram positive cocci in clusters resembling Staph    Narrative:      Aerobic Bottle Positive     Culture, Body Fluid (Aerobic) w/ GS [[id number]]     Order Status: Sent Specimen: Body Fluid from Knee, Left     Culture, Anaerobe [[id number]] Collected: 06/15/25 0822    Order Status: Sent Specimen: Body Fluid from Knee, Left Updated: 06/15/25 0856    MRSA/SA Rapid ID by PCR from Blood culture [[id number]]  (Abnormal) Collected: 06/13/25 1834    Order Status: Completed Specimen: Blood from Peripheral, Upper Arm, Right Updated: 06/15/25 0513     Staph aureus ID by PCR Positive     MRSA ID by PCR Positive    Blood culture x two cultures. Draw prior to  antibiotics. [[id number]]  (Abnormal) Collected: 06/13/25 1834    Order Status: Completed Specimen: Blood from Peripheral, Upper Arm, Right Updated: 06/15/25 0406     Blood Culture No Growth After 6 Hours     GRAM STAIN Gram positive cocci in clusters resembling Staph     Comment: Aerobic Bottle Positive       Narrative:           Stool culture [[id number]]     Order Status: Canceled Specimen: Stool     Clostridium difficile EIA [[id number]]     Order Status: Canceled Specimen: Stool

## 2025-06-17 LAB
ABSOLUTE EOSINOPHIL (OHS): 0.12 K/UL
ABSOLUTE MONOCYTE (OHS): 0.68 K/UL (ref 0.3–1)
ABSOLUTE NEUTROPHIL COUNT (OHS): 4.46 K/UL (ref 1.8–7.7)
ANION GAP (OHS): 13 MMOL/L (ref 8–16)
BACTERIA BLD CULT: ABNORMAL
BACTERIA BLD CULT: ABNORMAL
BASOPHILS # BLD AUTO: 0.05 K/UL
BASOPHILS NFR BLD AUTO: 0.5 %
BUN SERPL-MCNC: 19 MG/DL (ref 6–20)
CALCIUM SERPL-MCNC: 9.6 MG/DL (ref 8.7–10.5)
CHLORIDE SERPL-SCNC: 108 MMOL/L (ref 95–110)
CO2 SERPL-SCNC: 19 MMOL/L (ref 23–29)
CREAT SERPL-MCNC: 1.2 MG/DL (ref 0.5–1.4)
ERYTHROCYTE [DISTWIDTH] IN BLOOD BY AUTOMATED COUNT: 15.7 % (ref 11.5–14.5)
GFR SERPLBLD CREATININE-BSD FMLA CKD-EPI: 57 ML/MIN/1.73/M2
GLUCOSE SERPL-MCNC: 102 MG/DL (ref 70–110)
GRAM STN SPEC: ABNORMAL
GRAM STN SPEC: ABNORMAL
HCT VFR BLD AUTO: 35.6 % (ref 37–48.5)
HGB BLD-MCNC: 11.2 GM/DL (ref 12–16)
IMM GRANULOCYTES # BLD AUTO: 0.03 K/UL (ref 0–0.04)
IMM GRANULOCYTES NFR BLD AUTO: 0.3 % (ref 0–0.5)
LYMPHOCYTES # BLD AUTO: 4.64 K/UL (ref 1–4.8)
MAGNESIUM SERPL-MCNC: 2.2 MG/DL (ref 1.6–2.6)
MCH RBC QN AUTO: 26.9 PG (ref 27–31)
MCHC RBC AUTO-ENTMCNC: 31.5 G/DL (ref 32–36)
MCV RBC AUTO: 86 FL (ref 82–98)
NUCLEATED RBC (/100WBC) (OHS): 0 /100 WBC
PLATELET # BLD AUTO: 461 K/UL (ref 150–450)
PMV BLD AUTO: 8.8 FL (ref 9.2–12.9)
POTASSIUM SERPL-SCNC: 4.1 MMOL/L (ref 3.5–5.1)
RBC # BLD AUTO: 4.16 M/UL (ref 4–5.4)
RELATIVE EOSINOPHIL (OHS): 1.2 %
RELATIVE LYMPHOCYTE (OHS): 46.5 % (ref 18–48)
RELATIVE MONOCYTE (OHS): 6.8 % (ref 4–15)
RELATIVE NEUTROPHIL (OHS): 44.7 % (ref 38–73)
SODIUM SERPL-SCNC: 140 MMOL/L (ref 136–145)
VANCOMYCIN TROUGH SERPL-MCNC: 27.4 UG/ML (ref 10–22)
WBC # BLD AUTO: 9.98 K/UL (ref 3.9–12.7)

## 2025-06-17 PROCEDURE — 25000003 PHARM REV CODE 250: Performed by: HOSPITALIST

## 2025-06-17 PROCEDURE — 27000207 HC ISOLATION

## 2025-06-17 PROCEDURE — 63600175 PHARM REV CODE 636 W HCPCS: Performed by: ORTHOPAEDIC SURGERY

## 2025-06-17 PROCEDURE — 80048 BASIC METABOLIC PNL TOTAL CA: CPT | Performed by: HOSPITALIST

## 2025-06-17 PROCEDURE — 21400001 HC TELEMETRY ROOM

## 2025-06-17 PROCEDURE — 25000003 PHARM REV CODE 250: Performed by: PHYSICIAN ASSISTANT

## 2025-06-17 PROCEDURE — 25000003 PHARM REV CODE 250: Performed by: NURSE PRACTITIONER

## 2025-06-17 PROCEDURE — 99223 1ST HOSP IP/OBS HIGH 75: CPT | Mod: ,,, | Performed by: INTERNAL MEDICINE

## 2025-06-17 PROCEDURE — 85025 COMPLETE CBC W/AUTO DIFF WBC: CPT | Performed by: HOSPITALIST

## 2025-06-17 PROCEDURE — 36415 COLL VENOUS BLD VENIPUNCTURE: CPT | Performed by: HOSPITALIST

## 2025-06-17 PROCEDURE — 25000003 PHARM REV CODE 250: Performed by: ORTHOPAEDIC SURGERY

## 2025-06-17 PROCEDURE — 63600175 PHARM REV CODE 636 W HCPCS: Performed by: HOSPITALIST

## 2025-06-17 PROCEDURE — 80202 ASSAY OF VANCOMYCIN: CPT | Performed by: HOSPITALIST

## 2025-06-17 PROCEDURE — 83735 ASSAY OF MAGNESIUM: CPT | Performed by: HOSPITALIST

## 2025-06-17 RX ORDER — LOPERAMIDE HYDROCHLORIDE 2 MG/1
2 CAPSULE ORAL 4 TIMES DAILY PRN
Status: DISCONTINUED | OUTPATIENT
Start: 2025-06-17 | End: 2025-06-20 | Stop reason: HOSPADM

## 2025-06-17 RX ORDER — L. ACIDOPHILUS/L.BULGARICUS 100MM CELL
1 GRANULES IN PACKET (EA) ORAL 2 TIMES DAILY
Status: DISCONTINUED | OUTPATIENT
Start: 2025-06-17 | End: 2025-06-20 | Stop reason: HOSPADM

## 2025-06-17 RX ORDER — SIMETHICONE 80 MG
1 TABLET,CHEWABLE ORAL 3 TIMES DAILY PRN
Status: DISCONTINUED | OUTPATIENT
Start: 2025-06-17 | End: 2025-06-20 | Stop reason: HOSPADM

## 2025-06-17 RX ADMIN — GABAPENTIN 300 MG: 300 CAPSULE ORAL at 08:06

## 2025-06-17 RX ADMIN — Medication 1 EACH: at 08:06

## 2025-06-17 RX ADMIN — Medication 1 EACH: at 02:06

## 2025-06-17 RX ADMIN — OXYCODONE HYDROCHLORIDE 10 MG: 10 TABLET ORAL at 03:06

## 2025-06-17 RX ADMIN — MORPHINE SULFATE 6 MG: 2 INJECTION, SOLUTION INTRAMUSCULAR; INTRAVENOUS at 08:06

## 2025-06-17 RX ADMIN — MUPIROCIN: 20 OINTMENT TOPICAL at 08:06

## 2025-06-17 RX ADMIN — SIMETHICONE 80 MG: 80 TABLET, CHEWABLE ORAL at 06:06

## 2025-06-17 RX ADMIN — VALACYCLOVIR HYDROCHLORIDE 1000 MG: 500 TABLET, FILM COATED ORAL at 08:06

## 2025-06-17 RX ADMIN — MORPHINE SULFATE 6 MG: 2 INJECTION, SOLUTION INTRAMUSCULAR; INTRAVENOUS at 07:06

## 2025-06-17 RX ADMIN — VANCOMYCIN HYDROCHLORIDE 1500 MG: 1.5 INJECTION, POWDER, LYOPHILIZED, FOR SOLUTION INTRAVENOUS at 01:06

## 2025-06-17 RX ADMIN — GABAPENTIN 300 MG: 300 CAPSULE ORAL at 02:06

## 2025-06-17 RX ADMIN — OXYCODONE HYDROCHLORIDE 10 MG: 10 TABLET ORAL at 12:06

## 2025-06-17 RX ADMIN — OXYCODONE HYDROCHLORIDE 10 MG: 10 TABLET ORAL at 06:06
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
## 2025-06-17 NOTE — SUBJECTIVE & OBJECTIVE
Interval History:  No acute events overnight.  Remains afebrile since 6/14 at 8:13 AM.  Had episode of loose stool last night.  Pain is well managed at this time.  Reports desire to do outpatient substance treatment once discharged.  All questions answered and patient had no further complaints.      Objective:     Vital Signs (Most Recent):  Temp: 98.1 °F (36.7 °C) (06/17/25 0730)  Pulse: 90 (06/17/25 0730)  Resp: 18 (06/17/25 0730)  BP: 137/87 (06/17/25 0730)  SpO2: 100 % (06/17/25 0730) Vital Signs (24h Range):  Temp:  [97.7 °F (36.5 °C)-98.3 °F (36.8 °C)] 98.1 °F (36.7 °C)  Pulse:  [] 90  Resp:  [14-20] 18  SpO2:  [93 %-100 %] 100 %  BP: (108-157)/(65-91) 137/87     Weight: 97.5 kg (214 lb 15.2 oz)  Body mass index is 35.77 kg/m².    Intake/Output Summary (Last 24 hours) at 6/17/2025 1124  Last data filed at 6/17/2025 0324  Gross per 24 hour   Intake 1411.58 ml   Output 0 ml   Net 1411.58 ml         Physical Exam  Constitutional:       General: She is not in acute distress.     Appearance: She is obese. She is not ill-appearing or toxic-appearing.   HENT:      Head: Atraumatic.      Mouth/Throat:      Mouth: Mucous membranes are moist.   Eyes:      Extraocular Movements: Extraocular movements intact.   Cardiovascular:      Rate and Rhythm: Regular rhythm. Tachycardia present.      Heart sounds: Normal heart sounds. No murmur heard.  Pulmonary:      Effort: Pulmonary effort is normal.      Breath sounds: Normal breath sounds. No wheezing.   Abdominal:      General: Bowel sounds are normal. There is no distension.      Palpations: Abdomen is soft.      Tenderness: There is no abdominal tenderness.   Musculoskeletal:         General: Swelling and tenderness present. No deformity. Normal range of motion.      Cervical back: Normal range of motion.      Comments: Left knee in post-surgical dressing   Skin:     Comments: Multiple small areas of induration on her skin   Neurological:      Mental Status: She is  alert and oriented to person, place, and time. Mental status is at baseline.   Psychiatric:         Mood and Affect: Mood normal.         Behavior: Behavior normal.               Significant Labs: All pertinent labs within the past 24 hours have been reviewed.    Significant Imaging: I have reviewed all pertinent imaging results/findings within the past 24 hours.

## 2025-06-17 NOTE — PROGRESS NOTES
"[facility] Surg [address] Medicine  Progress Note    Patient Name: Leslie Gann  MRN: [id number]  Patient Class: IP- Inpatient   Admission Date: 6/13/2025  Length of Stay: 3 days  Attending Physician: North Iyer MD  Primary Care Provider: Cailin Shirley FNP-C        Subjective     Principal Problem:Staphylococcal arthritis of left knee        HPI:  Per Rakesh Morrell, NP:    "The patient is a 45 y.o. female with medical history of IV drug abuse (last injected 1 hour prior to coming to the ED), recent shingles infection, and leg wounds who presents with bilateral leg pain with associated redness and erythema.  Patient states she does "inject dope and cocaine and has multiple abscesses due to injection sites.  Patient states increased pain in her right knee with difficulty ambulating.  Patient endorsing fever and chills.  No nausea or vomiting.  CT positive for small abscess to right femoral medial condyle and various areas of subq air likely due to injection sites.  She will be admitted for further management of her right knee abscess."    Overview/Hospital Course:  Patient is a 45 year woman with and intravenous drug use presented with diffuse body pain involving multiple areas of redness erythema along with left knee pain.    Patient with evidence of sepsis secondary to skin soft tissue infection and also now concern for left knee infection as left knee more painful and swollen.  Blood cultures positive for Staphylococcus aureus (PCR testing consistent with MRSA).    NPO.  Adjusted pain medication for better pain control.  Orthopedic surgery consulted for evaluation aspiration and joint/washout.    Interval History:  No acute events overnight.  Remains afebrile since 6/14 at 8:13 AM.  Had episode of loose stool last night.  Pain is well managed at this time.  Reports desire to do outpatient substance treatment once discharged.  All questions answered and patient had no " further complaints.      Objective:     Vital Signs (Most Recent):  Temp: 98.1 °F (36.7 °C) (06/17/25 0730)  Pulse: 90 (06/17/25 0730)  Resp: 18 (06/17/25 0730)  BP: 137/87 (06/17/25 0730)  SpO2: 100 % (06/17/25 0730) Vital Signs (24h Range):  Temp:  [97.7 °F (36.5 °C)-98.3 °F (36.8 °C)] 98.1 °F (36.7 °C)  Pulse:  [] 90  Resp:  [14-20] 18  SpO2:  [93 %-100 %] 100 %  BP: (108-157)/(65-91) 137/87     Weight: 97.5 kg (214 lb 15.2 oz)  Body mass index is 35.77 kg/m².    Intake/Output Summary (Last 24 hours) at 6/17/2025 1124  Last data filed at 6/17/2025 0324  Gross per 24 hour   Intake 1411.58 ml   Output 0 ml   Net 1411.58 ml         Physical Exam  Constitutional:       General: She is not in acute distress.     Appearance: She is obese. She is not ill-appearing or toxic-appearing.   HENT:      Head: Atraumatic.      Mouth/Throat:      Mouth: Mucous membranes are moist.   Eyes:      Extraocular Movements: Extraocular movements intact.   Cardiovascular:      Rate and Rhythm: Regular rhythm. Tachycardia present.      Heart sounds: Normal heart sounds. No murmur heard.  Pulmonary:      Effort: Pulmonary effort is normal.      Breath sounds: Normal breath sounds. No wheezing.   Abdominal:      General: Bowel sounds are normal. There is no distension.      Palpations: Abdomen is soft.      Tenderness: There is no abdominal tenderness.   Musculoskeletal:         General: Swelling and tenderness present. No deformity. Normal range of motion.      Cervical back: Normal range of motion.      Comments: Left knee in post-surgical dressing   Skin:     Comments: Multiple small areas of induration on her skin   Neurological:      Mental Status: She is alert and oriented to person, place, and time. Mental status is at baseline.   Psychiatric:         Mood and Affect: Mood normal.         Behavior: Behavior normal.               Significant Labs: All pertinent labs within the past 24 hours have been reviewed.    Significant  Imaging: I have reviewed all pertinent imaging results/findings within the past 24 hours.        Assessment & Plan  Staphylococcal arthritis of left knee  Sepsis  Methicillin resistant Staphylococcus aureus infection  Bacteremia due to methicillin resistant Staphylococcus aureus  Intravenous drug abuse, continuous  Opioid use disorder  -Admitted to inpatient status  -Presented with fevers and left knee pain.  Noted history of IVDA  -Blood cultures growing MRSA -   -Source likely related there her IV drug use and septic left knee  -Orthopedic surgery consulted and input appreciated.  She was taken for irrigation and debridement / arthrotomy of her left knee on 6/15.  -Repeat blood cultures 6/16 are negative thus far  -TTE without evidence of valvular vegetations  -ID consulted and input appreciated.  Discussed care with Dr. Dalton today.  She recommends ICTLALY and will consult cardiology.  -She is feeling much better today.  Remains afebrile since 6/14 at 8:23 AM.  -Continue vancomycin for now.  Unclear if she will be candidate for outpatient iv antibiotics?  -Counseled on importance of complete abstinence from illicit drugs.  UDS positive for cocaine, opiates and THC.  Patient wishes to pursue outpatient treatment upon discharge.  Diarrhea  -Reported on admit  -Noted 1 episode last night  -Start probiotic and prn imodium  Anemia  -Hb 9.7 on admit and now 11.2  -No evidence of bleeding  -Repeat cbc in AM  Obesity  Body mass index is 35.77 kg/m². Morbid obesity complicates all aspects of disease management from diagnostic modalities to treatment. Weight loss encouraged and health benefits explained to patient.  VTE Risk Mitigation (From admission, onward)           Ordered     IP VTE HIGH RISK PATIENT  Once         06/14/25 0224     Place sequential compression device  Until discontinued         06/14/25 0224     Reason for No Pharmacological VTE Prophylaxis  Once        Question:  Reasons:  Answer:  Risk of Bleeding     06/14/25 0224                    Discharge Planning   LIVIER: 6/19/2025     Code Status: Full Code   Medical Readiness for Discharge Date:   Discharge Plan A: Home with family (pending PT/OT consult)                        North Iyer MD  Department of Hospital Medicine   [facility] ([address])

## 2025-06-17 NOTE — ASSESSMENT & PLAN NOTE
-Admitted to inpatient status  -Presented with fevers and left knee pain.  Noted history of IVDA  -Blood cultures growing MRSA -   -Source likely related there her IV drug use and septic left knee  -Orthopedic surgery consulted and input appreciated.  She was taken for irrigation and debridement / arthrotomy of her left knee on 6/15.  -Repeat blood cultures 6/16 are negative thus far  -TTE without evidence of valvular vegetations  -ID consulted and input appreciated.  Discussed care with Dr. Dalton today.  She recommends CITLALY and will consult cardiology.  -She is feeling much better today.  Remains afebrile since 6/14 at 8:23 AM.  -Continue vancomycin for now.  Unclear if she will be candidate for outpatient iv antibiotics?  -Counseled on importance of complete abstinence from illicit drugs.  UDS positive for cocaine, opiates and THC.  Patient wishes to pursue outpatient treatment upon discharge.

## 2025-06-17 NOTE — PROGRESS NOTES
Pharmacokinetic Assessment Follow Up: IV Vancomycin    Vancomycin serum concentration assessment(s):    The trough level was drawn correctly and can be used to guide therapy at this time. The measurement is above the desired definitive target range of 10 to 20 mcg/mL.    Vancomycin Regimen Plan:    Discontinue the scheduled vancomycin regimen and re-dose when the random level is less than 20 mcg/mL, next level to be drawn at 0100 on 6/18/24.    Drug levels (last 3 results):  Recent Labs   Lab Result Units 06/16/25  0008 06/17/25  1250   Vancomycin Trough ug/ml 20.5 27.4*       Pharmacy will continue to follow and monitor vancomycin.    Please contact pharmacy at extension [phone] for questions regarding this assessment.    Thank you for the consult,   Antoinette Spivey       Patient brief summary:  Leslie Gann is a 45 y.o. female initiated on antimicrobial therapy with IV Vancomycin for treatment of skin & soft tissue infection    The patient's current regimen is vancomycin IV 1500 mg q12h which resulted in a supratherapeutic trough, see changes above    Drug Allergies:   Review of patient's allergies indicates:  No Known Allergies    Actual Body Weight:   97.5 kg    Renal Function:   Estimated Creatinine Clearance: 67.7 mL/min (based on SCr of 1.2 mg/dL).,     Dialysis Method (if applicable):  N/A    CBC (last 72 hours):  Recent Labs   Lab Result Units 06/15/25  0803 06/16/25  0429 06/17/25  0454   WBC K/uL 8.71 7.63 9.98   HGB gm/dL 9.7* 10.4* 11.2*   HCT % 29.9* 33.4* 35.6*   Platelet Count K/uL 367 417 461*   Lymph % % 45.9 26.7 46.5   Mono % % 12.2 3.7* 6.8   Eos % % 3.1 0.0 1.2   Basophil % % 0.3 0.1 0.5       Metabolic Panel (last 72 hours):  Recent Labs   Lab Result Units 06/15/25  0803 06/16/25  0429 06/17/25  0454   Sodium mmol/L 140 137 140   Potassium mmol/L 4.1 4.7 4.1   Chloride mmol/L 109 109 108   CO2 mmol/L 22* 18* 19*   Glucose mg/dL 116* 161* 102   BUN mg/dL 8 16 19   Creatinine mg/dL 0.9 1.0  1.2   Magnesium  mg/dL 2.1 2.3 2.2   Phosphorus Level mg/dL 3.2 3.7  --        Vancomycin Administrations:  vancomycin given in the last 96 hours                     vancomycin 1,500 mg in 0.9% NaCl 250 mL IVPB (admixture device) (mg) 1,500 mg New Bag 06/17/25 0142     1,500 mg New Bag 06/16/25 1424     1,500 mg New Bag  0109    vancomycin 1,750 mg in 0.9% NaCl 500 mL IVPB (admixture device) (mg) 1,750 mg New Bag 06/15/25 1130     1,750 mg New Bag 06/14/25 2348     1,750 mg New Bag  1000    vancomycin 2 g in 0.9% sodium chloride 500 mL IVPB (mg) 2,000 mg New Bag 06/13/25 2008                    Microbiologic Results:  Microbiology Results (last 7 days)       Procedure Component Value Units Date/Time    Blood culture [[id number]]  (Normal) Collected: 06/16/25 0726    Order Status: Completed Specimen: Blood Updated: 06/17/25 1301     Blood Culture No Growth After 24 Hours    Blood culture [[id number]]  (Normal) Collected: 06/16/25 0726    Order Status: Completed Specimen: Blood Updated: 06/17/25 1202     Blood Culture No Growth After 24 Hours    Blood culture x two cultures. Draw prior to antibiotics. [[id number]]  (Abnormal) Collected: 06/13/25 1748    Order Status: Completed Specimen: Blood from Peripheral, Upper Arm, Left Updated: 06/17/25 1110     Blood Culture Methicillin resistant Staphylococcus aureus     GRAM STAIN Gram positive cocci in clusters resembling Staph     Comment: Aerobic Bottle Positive        Narrative:        For susceptibility see accession number  [id number]      Blood culture x two cultures. Draw prior to antibiotics. [[id number]]  (Abnormal)  (Susceptibility) Collected: 06/13/25 1834    Order Status: Completed Specimen: Blood from Peripheral, Upper Arm, Right Updated: 06/17/25 1106     Blood Culture Methicillin resistant Staphylococcus aureus     GRAM STAIN Gram positive cocci in clusters resembling Staph     Comment: Aerobic Bottle Positive       Narrative:         ID Consult Strongly  Recommended    Culture, Anaerobe [[id number]] Collected: 06/15/25 0822    Order Status: Completed Specimen: Body Fluid from Knee, Left Updated: 06/17/25 0733     Anaerobe Culture Culture In Progress    Aerobic culture [[id number]] Collected: 06/15/25 0822    Order Status: Completed Specimen: Aspirate from Knee, Left Updated: 06/17/25 0733     CULTURE, AEROBIC No Growth To Date    Culture, Body Fluid (Aerobic) w/ GS [[id number]] Collected: 06/15/25 1555    Order Status: Completed Specimen: Body Fluid from Knee, Left Updated: 06/17/25 0733     CULTURE, FLUID No Growth To Date     GRAM STAIN No WBCs      No organisms seen    Aerobic culture [[id number]] Collected: 06/15/25 1555    Order Status: Sent Specimen: Body Fluid from Knee, Left     Culture, Body Fluid (Aerobic) w/ GS [[id number]]     Order Status: Sent Specimen: Body Fluid from Knee, Left     MRSA/SA Rapid ID by PCR from Blood culture [[id number]]  (Abnormal) Collected: 06/13/25 1834    Order Status: Completed Specimen: Blood from Peripheral, Upper Arm, Right Updated: 06/15/25 0513     Staph aureus ID by PCR Positive     MRSA ID by PCR Positive    Stool culture [[id number]]     Order Status: Canceled Specimen: Stool     Clostridium difficile EIA [[id number]]     Order Status: Canceled Specimen: Stool

## 2025-06-17 NOTE — PROGRESS NOTES
Sitting up in chair.  Resting comfortably.  Appropriate pain in left knee    Vitals:    06/17/25 0720 06/17/25 0730 06/17/25 1230 06/17/25 1252   BP:  137/87  133/75   BP Location:  Left arm  Left arm   Patient Position:  Lying  Sitting   Pulse:  90  108   Resp: 16 18 16 18   Temp:  98.1 °F (36.7 °C)  98.2 °F (36.8 °C)   TempSrc:  Oral  Oral   SpO2:  100%  95%   Weight:       Height:            LLE - incision clean and dry; no drainage; no erythema; appropraite swelling      A/p  s/p I&D left knee   Pt/ot  Antibiotics  Staples out in 2 weeks

## 2025-06-18 LAB
ABSOLUTE EOSINOPHIL (OHS): 0.15 K/UL
ABSOLUTE MONOCYTE (OHS): 0.72 K/UL (ref 0.3–1)
ABSOLUTE NEUTROPHIL COUNT (OHS): 4.07 K/UL (ref 1.8–7.7)
ANION GAP (OHS): 13 MMOL/L (ref 8–16)
BASOPHILS # BLD AUTO: 0.04 K/UL
BASOPHILS NFR BLD AUTO: 0.4 %
BSA FOR ECHO PROCEDURE: 2.11 M2
BUN SERPL-MCNC: 15 MG/DL (ref 6–20)
CALCIUM SERPL-MCNC: 9 MG/DL (ref 8.7–10.5)
CHLORIDE SERPL-SCNC: 105 MMOL/L (ref 95–110)
CO2 SERPL-SCNC: 17 MMOL/L (ref 23–29)
CREAT SERPL-MCNC: 0.9 MG/DL (ref 0.5–1.4)
EJECTION FRACTION: 65 %
ERYTHROCYTE [DISTWIDTH] IN BLOOD BY AUTOMATED COUNT: 15.6 % (ref 11.5–14.5)
GFR SERPLBLD CREATININE-BSD FMLA CKD-EPI: >60 ML/MIN/1.73/M2
GLUCOSE SERPL-MCNC: 89 MG/DL (ref 70–110)
HCT VFR BLD AUTO: 30.8 % (ref 37–48.5)
HGB BLD-MCNC: 9.7 GM/DL (ref 12–16)
IMM GRANULOCYTES # BLD AUTO: 0.03 K/UL (ref 0–0.04)
IMM GRANULOCYTES NFR BLD AUTO: 0.3 % (ref 0–0.5)
LYMPHOCYTES # BLD AUTO: 4 K/UL (ref 1–4.8)
MAGNESIUM SERPL-MCNC: 1.8 MG/DL (ref 1.6–2.6)
MCH RBC QN AUTO: 27.1 PG (ref 27–31)
MCHC RBC AUTO-ENTMCNC: 31.5 G/DL (ref 32–36)
MCV RBC AUTO: 86 FL (ref 82–98)
NUCLEATED RBC (/100WBC) (OHS): 0 /100 WBC
PLATELET # BLD AUTO: ABNORMAL 10*3/UL
PLATELET BLD QL SMEAR: ABNORMAL
PMV BLD AUTO: ABNORMAL FL
POTASSIUM SERPL-SCNC: 4.9 MMOL/L (ref 3.5–5.1)
RBC # BLD AUTO: 3.58 M/UL (ref 4–5.4)
RELATIVE EOSINOPHIL (OHS): 1.7 %
RELATIVE LYMPHOCYTE (OHS): 44.4 % (ref 18–48)
RELATIVE MONOCYTE (OHS): 8 % (ref 4–15)
RELATIVE NEUTROPHIL (OHS): 45.2 % (ref 38–73)
SODIUM SERPL-SCNC: 135 MMOL/L (ref 136–145)
VANCOMYCIN SERPL-MCNC: 14.8 UG/ML (ref ?–80)
WBC # BLD AUTO: 9.01 K/UL (ref 3.9–12.7)

## 2025-06-18 PROCEDURE — 25000003 PHARM REV CODE 250: Performed by: INTERNAL MEDICINE

## 2025-06-18 PROCEDURE — 80202 ASSAY OF VANCOMYCIN: CPT | Performed by: HOSPITALIST

## 2025-06-18 PROCEDURE — 27000207 HC ISOLATION

## 2025-06-18 PROCEDURE — 63600175 PHARM REV CODE 636 W HCPCS: Performed by: INTERNAL MEDICINE

## 2025-06-18 PROCEDURE — 83735 ASSAY OF MAGNESIUM: CPT | Performed by: ORTHOPAEDIC SURGERY

## 2025-06-18 PROCEDURE — 82310 ASSAY OF CALCIUM: CPT | Performed by: ORTHOPAEDIC SURGERY

## 2025-06-18 PROCEDURE — 97166 OT EVAL MOD COMPLEX 45 MIN: CPT

## 2025-06-18 PROCEDURE — 97535 SELF CARE MNGMENT TRAINING: CPT

## 2025-06-18 PROCEDURE — 99152 MOD SED SAME PHYS/QHP 5/>YRS: CPT | Performed by: INTERNAL MEDICINE

## 2025-06-18 PROCEDURE — 25000003 PHARM REV CODE 250: Performed by: ORTHOPAEDIC SURGERY

## 2025-06-18 PROCEDURE — 99223 1ST HOSP IP/OBS HIGH 75: CPT | Mod: ,,, | Performed by: STUDENT IN AN ORGANIZED HEALTH CARE EDUCATION/TRAINING PROGRAM

## 2025-06-18 PROCEDURE — 97162 PT EVAL MOD COMPLEX 30 MIN: CPT

## 2025-06-18 PROCEDURE — 85025 COMPLETE CBC W/AUTO DIFF WBC: CPT | Performed by: ORTHOPAEDIC SURGERY

## 2025-06-18 PROCEDURE — 99233 SBSQ HOSP IP/OBS HIGH 50: CPT | Mod: ,,, | Performed by: INTERNAL MEDICINE

## 2025-06-18 PROCEDURE — G0545 PR VISIT INHERENT TO INPT OR OBS CARE, INFECTIOUS DISEASE: HCPCS | Mod: ,,, | Performed by: STUDENT IN AN ORGANIZED HEALTH CARE EDUCATION/TRAINING PROGRAM

## 2025-06-18 PROCEDURE — 36415 COLL VENOUS BLD VENIPUNCTURE: CPT | Performed by: HOSPITALIST

## 2025-06-18 PROCEDURE — 63600175 PHARM REV CODE 636 W HCPCS: Performed by: HOSPITALIST

## 2025-06-18 PROCEDURE — 94761 N-INVAS EAR/PLS OXIMETRY MLT: CPT

## 2025-06-18 PROCEDURE — 27000221 HC OXYGEN, UP TO 24 HOURS

## 2025-06-18 PROCEDURE — 36415 COLL VENOUS BLD VENIPUNCTURE: CPT | Performed by: ORTHOPAEDIC SURGERY

## 2025-06-18 PROCEDURE — 25000003 PHARM REV CODE 250: Performed by: HOSPITALIST

## 2025-06-18 PROCEDURE — 21400001 HC TELEMETRY ROOM

## 2025-06-18 PROCEDURE — 99153 MOD SED SAME PHYS/QHP EA: CPT | Performed by: INTERNAL MEDICINE

## 2025-06-18 RX ORDER — GABAPENTIN 300 MG/1
600 CAPSULE ORAL 3 TIMES DAILY
Status: DISCONTINUED | OUTPATIENT
Start: 2025-06-18 | End: 2025-06-20 | Stop reason: HOSPADM

## 2025-06-18 RX ORDER — MIDAZOLAM HYDROCHLORIDE 1 MG/ML
INJECTION INTRAMUSCULAR; INTRAVENOUS
Status: DISCONTINUED | OUTPATIENT
Start: 2025-06-18 | End: 2025-06-18

## 2025-06-18 RX ORDER — FENTANYL CITRATE 50 UG/ML
INJECTION, SOLUTION INTRAMUSCULAR; INTRAVENOUS
Status: DISCONTINUED | OUTPATIENT
Start: 2025-06-18 | End: 2025-06-18

## 2025-06-18 RX ADMIN — ACETAMINOPHEN 1000 MG: 500 TABLET, FILM COATED ORAL at 08:06

## 2025-06-18 RX ADMIN — GABAPENTIN 300 MG: 300 CAPSULE ORAL at 10:06

## 2025-06-18 RX ADMIN — VANCOMYCIN HYDROCHLORIDE 1000 MG: 1 INJECTION, POWDER, LYOPHILIZED, FOR SOLUTION INTRAVENOUS at 04:06

## 2025-06-18 RX ADMIN — GABAPENTIN 600 MG: 300 CAPSULE ORAL at 03:06

## 2025-06-18 RX ADMIN — OXYCODONE HYDROCHLORIDE 10 MG: 10 TABLET ORAL at 09:06

## 2025-06-18 RX ADMIN — VALACYCLOVIR HYDROCHLORIDE 1000 MG: 500 TABLET, FILM COATED ORAL at 10:06

## 2025-06-18 RX ADMIN — Medication 1 EACH: at 08:06

## 2025-06-18 RX ADMIN — OXYCODONE HYDROCHLORIDE 10 MG: 10 TABLET ORAL at 04:06

## 2025-06-18 RX ADMIN — GABAPENTIN 600 MG: 300 CAPSULE ORAL at 08:06

## 2025-06-18 RX ADMIN — ACETAMINOPHEN 1000 MG: 500 TABLET, FILM COATED ORAL at 10:06

## 2025-06-18 RX ADMIN — VALACYCLOVIR HYDROCHLORIDE 1000 MG: 500 TABLET, FILM COATED ORAL at 08:06

## 2025-06-18 RX ADMIN — VANCOMYCIN HYDROCHLORIDE 1000 MG: 1 INJECTION, POWDER, LYOPHILIZED, FOR SOLUTION INTRAVENOUS at 03:06

## 2025-06-18 NOTE — PLAN OF CARE
Problem: Adult Inpatient Plan of Care  Goal: Plan of Care Review  Outcome: Progressing  Goal: Patient-Specific Goal (Individualized)  Outcome: Progressing  Goal: Absence of Hospital-Acquired Illness or Injury  Outcome: Progressing  Goal: Optimal Comfort and Wellbeing  Outcome: Progressing  Goal: Readiness for Transition of Care  Outcome: Progressing     Problem: Infection  Goal: Absence of Infection Signs and Symptoms  Outcome: Progressing     Problem: Infection  Goal: Absence of Infection Signs and Symptoms  Outcome: Progressing

## 2025-06-18 NOTE — BRIEF OP NOTE
6/18/2025: [facility]: CITLALY: No vegetations seen. All valves unremarkable.    Deandre Reynolds M.D.

## 2025-06-18 NOTE — PROGRESS NOTES
"[facility] Surg [address] Medicine  Progress Note    Patient Name: Leslie Gann  MRN: [id number]  Patient Class: IP- Inpatient   Admission Date: 6/13/2025  Length of Stay: 4 days  Attending Physician: North Iyer MD  Primary Care Provider: Cailin Shirley FNP-C        Subjective     Principal Problem:Staphylococcal arthritis of left knee        HPI:  Per Rakesh Morrell, NP:    "The patient is a 45 y.o. female with medical history of IV drug abuse (last injected 1 hour prior to coming to the ED), recent shingles infection, and leg wounds who presents with bilateral leg pain with associated redness and erythema.  Patient states she does "inject dope and cocaine and has multiple abscesses due to injection sites.  Patient states increased pain in her right knee with difficulty ambulating.  Patient endorsing fever and chills.  No nausea or vomiting.  CT positive for small abscess to right femoral medial condyle and various areas of subq air likely due to injection sites.  She will be admitted for further management of her right knee abscess."    Overview/Hospital Course:  Patient is a 45 year woman with and intravenous drug use presented with diffuse body pain involving multiple areas of redness erythema along with left knee pain.    Patient with evidence of sepsis secondary to skin soft tissue infection and also now concern for left knee infection as left knee more painful and swollen.  Blood cultures positive for Staphylococcus aureus (PCR testing consistent with MRSA).    NPO.  Adjusted pain medication for better pain control.  Orthopedic surgery consulted for evaluation aspiration and joint/washout.    Interval History:  No acute events overnight.  Seen after CITLALY this morning.  In good spirits and hopeful to be able to go home soon.  Discussed that I'm not sure if she will be a candidate for home IV antibiotics - await input from ID.  Pain is well managed at this time.  I have " not witnessed any drug seeking behavior during her hospitalization.  Reports desire to do outpatient substance treatment once discharged.  All questions answered and patient had no further complaints.    Objective:     Vital Signs (Most Recent):  Temp: 99.3 °F (37.4 °C) (06/18/25 0755)  Pulse: 100 (06/18/25 0857)  Resp: 18 (06/18/25 0755)  BP: 124/74 (06/18/25 0857)  SpO2: 100 % (06/18/25 0857) Vital Signs (24h Range):  Temp:  [98.1 °F (36.7 °C)-99.3 °F (37.4 °C)] 99.3 °F (37.4 °C)  Pulse:  [] 100  Resp:  [16-20] 18  SpO2:  [93 %-100 %] 100 %  BP: (112-166)/(72-94) 124/74     Weight: 97.1 kg (214 lb)  Body mass index is 35.61 kg/m².    Intake/Output Summary (Last 24 hours) at 6/18/2025 1007  Last data filed at 6/18/2025 0639  Gross per 24 hour   Intake 180 ml   Output 1 ml   Net 179 ml         Physical Exam  Constitutional:       General: She is not in acute distress.     Appearance: She is obese. She is not ill-appearing or toxic-appearing.   HENT:      Head: Atraumatic.      Mouth/Throat:      Mouth: Mucous membranes are moist.   Eyes:      Extraocular Movements: Extraocular movements intact.   Cardiovascular:      Rate and Rhythm: Regular rhythm. Tachycardia present.      Heart sounds: Normal heart sounds. No murmur heard.  Pulmonary:      Effort: Pulmonary effort is normal.      Breath sounds: Normal breath sounds. No wheezing.   Abdominal:      General: Bowel sounds are normal. There is no distension.      Palpations: Abdomen is soft.      Tenderness: There is no abdominal tenderness.   Musculoskeletal:         General: Swelling and tenderness present. No deformity. Normal range of motion.      Cervical back: Normal range of motion.      Comments: Left knee in post-surgical dressing   Skin:     Comments: Multiple small areas of induration on her skin   Neurological:      Mental Status: She is alert and oriented to person, place, and time. Mental status is at baseline.   Psychiatric:         Mood and  Affect: Mood normal.         Behavior: Behavior normal.               Significant Labs: All pertinent labs within the past 24 hours have been reviewed.    Significant Imaging: I have reviewed all pertinent imaging results/findings within the past 24 hours.      Assessment & Plan  Staphylococcal arthritis of left knee  Sepsis  Methicillin resistant Staphylococcus aureus infection  Bacteremia due to methicillin resistant Staphylococcus aureus  Intravenous drug abuse, continuous  Opioid use disorder  -Admitted to inpatient status  -Presented with fevers and left knee pain.  Noted history of IVDA  -Blood cultures growing MRSA -   -Source likely related there her IV drug use and septic left knee  -Orthopedic surgery consulted and input appreciated.  She was taken for irrigation and debridement / arthrotomy of her left knee on 6/15.  -Repeat blood cultures 6/16 are negative thus far  -TTE and CITLALY without evidence of valvular vegetations  -ID consulted and input appreciated.  Discussed care with Dr. Dalton today.    -She is feeling well and eager to go home.  Remains afebrile since 6/14 at 8:23 AM.  -Continue vancomycin for now.  Unclear if she will be candidate for outpatient iv antibiotics?  Await input from ID for final antibiotic recommendations.  -Counseled on importance of complete abstinence from illicit drugs.  UDS positive for cocaine, opiates and THC.  Patient wishes to pursue outpatient treatment upon discharge.  Diarrhea  -Reported on admit  -Noted 1 episode overnight 6/16-6/17  -Started probiotic and prn imodium  Anemia  -Hb 9.7 on admit and now 9.7  -No evidence of bleeding  -Repeat cbc in AM  Obesity  Body mass index is 35.61 kg/m². Morbid obesity complicates all aspects of disease management from diagnostic modalities to treatment. Weight loss encouraged and health benefits explained to patient.  VTE Risk Mitigation (From admission, onward)           Ordered     IP VTE HIGH RISK PATIENT  Once          06/14/25 0224     Place sequential compression device  Until discontinued         06/14/25 0224     Reason for No Pharmacological VTE Prophylaxis  Once        Question:  Reasons:  Answer:  Risk of Bleeding    06/14/25 0224                    Discharge Planning   LIVIER: 6/19/2025     Code Status: Full Code   Medical Readiness for Discharge Date:   Discharge Plan A: Home with family (pending PT/OT consult)            North Iyer MD  Department of Hospital Medicine   [employer] - [facility] Surg ([address]

## 2025-06-18 NOTE — HPI
Leslie Gnan is a 45 year old woman who injects drugs who was admitted 6/13 due to bilateral leg pain. She injects cocaine and heroin into her legs and has skin wounds at injection sites. She reports she developed worsening L knee pain prompting admission. She had fevers prior to admission, now resolved. She denies any other joint pain. Blood cultures grew MRSA. TTE negative for IE. Underwent arthrocentesis with orthopedics, cell count 36K with 95 segs. Cultures with staph aureus. Underwent debridement with orthopedics on 6/16. Underwent CITLALY, negative for IE. Repeat blood cultures are no growth x 48 hours. She feels strongly about returning home to take care of her 8 year old son with the assistance of her parents. She plans to attend outpatient rehab until her  returns home in September and then she intends to enter inpatient rehab. She is committed to avoiding drugs to prolong her life and be there for her son. We discussed general hesitancy to discharge someone with history of injection drug use with a PICC line. She understands this, but is committed to maintaining sobriety while on IV antibiotics. We discussed that if there is concern for ongoing drug use during her antibiotic course, we will have to remove her PICC line. She agrees to signing a behavioral contract.

## 2025-06-18 NOTE — ASSESSMENT & PLAN NOTE
-Admitted to inpatient status  -Presented with fevers and left knee pain.  Noted history of IVDA  -Blood cultures growing MRSA -   -Source likely related there her IV drug use and septic left knee  -Orthopedic surgery consulted and input appreciated.  She was taken for irrigation and debridement / arthrotomy of her left knee on 6/15.  -Repeat blood cultures 6/16 are negative thus far  -TTE and CITLALY without evidence of valvular vegetations  -ID consulted and input appreciated.  Discussed care with Dr. Dalton today.    -She is feeling well and eager to go home.  Remains afebrile since 6/14 at 8:23 AM.  -Continue vancomycin for now.  Unclear if she will be candidate for outpatient iv antibiotics?  Await input from ID for final antibiotic recommendations.  -Counseled on importance of complete abstinence from illicit drugs.  UDS positive for cocaine, opiates and THC.  Patient wishes to pursue outpatient treatment upon discharge.

## 2025-06-18 NOTE — PT/OT/SLP PROGRESS
Physical Therapy      Patient Name:  Leslie Gann   MRN:  [id number]    Patient not seen today secondary to Off the floor for procedure/surgery. Will follow-up as schedule allows.
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
## 2025-06-18 NOTE — PLAN OF CARE
D/C Rec: Low Intensity Therapy (OPPT for L LE ROM and strengthening when cleared by MD)  DME Rec: Potentially SPC, will trial next visit    Pt evaluated, ambulating around room and completing transfers Mod I without AD or significant L knee pain complaints. However, pt with limited knee flexion ROM and c/o moderate pain with therex. Pt would benefit from continued P.T. services to address post-op mobility deficits and restore PLOF.     Problem: Physical Therapy  Goal: Physical Therapy Goal  Description: P.T goals to be met in 2 weeks as follows:  1. Gait 300' Mod I with/without AD  2. I with L knee ROM HEP  3. Pt will ascend/descend 3 steps Mod I    Outcome: Progressing

## 2025-06-18 NOTE — ASSESSMENT & PLAN NOTE
Body mass index is 35.61 kg/m². Morbid obesity complicates all aspects of disease management from diagnostic modalities to treatment. Weight loss encouraged and health benefits explained to patient.

## 2025-06-18 NOTE — PROGRESS NOTES
[facility] [address]  Cardiology  Progress Note    Patient Name: Leslie Gann  MRN: [id number]  Admission Date: 6/13/2025  Hospital Length of Stay: 4 days  Code Status: Full Code   Attending Physician: [place] MD   Primary Care Physician: Cailin Shirley FNP-ИРИНА  Expected Discharge Date: 6/19/2025  Principal Problem:Staphylococcal arthritis of left knee    Subjective:     Brief HPI:    Leslie Gann is a 45 y.o.female with iv drug use. Lately she has mostly injected drugs into her left leg. She presented with cellulitis of her left leg and septic arthritis of her left knee that required drainage. She had an echocardiogram that revealed unremarkable valves. Asked to perform transesophageal echocardiogram.     Hospital Course:     Antibiotics iv.    Interval History:     6/18/2025: Plan is CITLALY.    Review of Systems   Constitutional: Negative for chills, fever and malaise/fatigue.   HENT:  Negative for nosebleeds.    Eyes:  Negative for vision loss in left eye and vision loss in right eye.   Cardiovascular:  Negative for chest pain, leg swelling, orthopnea, palpitations and paroxysmal nocturnal dyspnea.   Respiratory:  Negative for cough, hemoptysis, shortness of breath, sputum production and wheezing.    Hematologic/Lymphatic: Negative for bleeding problem. Does not bruise/bleed easily.   Skin:  Positive for rash (left leg). Negative for color change.   Musculoskeletal:  Positive for joint pain (left knee). Negative for muscle weakness and myalgias.   Gastrointestinal:  Negative for abdominal pain, heartburn, hematemesis, hematochezia, melena, nausea and vomiting.   Genitourinary:  Negative for hematuria.   Neurological:  Negative for dizziness, focal weakness, headaches, light-headedness, vertigo and weakness.   Psychiatric/Behavioral:  Negative for altered mental status. The patient is not nervous/anxious.    Allergic/Immunologic: Negative for persistent infections.      Objective:     Vital Signs (Most Recent):  Temp: 98.1 °F (36.7 °C) (06/18/25 0511)  Pulse: 102 (06/18/25 0511)  Resp: 18 (06/18/25 0511)  BP: 112/72 (06/18/25 0511)  SpO2: (!) 93 % (06/18/25 0511) Vital Signs (24h Range):  Temp:  [98.1 °F (36.7 °C)-98.7 °F (37.1 °C)] 98.1 °F (36.7 °C)  Pulse:  [] 102  Resp:  [16-20] 18  SpO2:  [93 %-100 %] 93 %  BP: (112-166)/(72-94) 112/72     Weight: 97.5 kg (214 lb 15.2 oz)  Body mass index is 35.77 kg/m².    SpO2: (!) 93 %         Intake/Output Summary (Last 24 hours) at 6/18/2025 0754  Last data filed at 6/18/2025 0639  Gross per 24 hour   Intake 180 ml   Output 1 ml   Net 179 ml       Lines/Drains/Airways       Peripheral Intravenous Line  Duration                  Midline Catheter - Single Lumen 06/15/25 1820 Right basilic vein (medial side of arm) other (see comments) 2 days         Peripheral IV - Single Lumen 06/15/25 1500 22 G Anterior;Right Forearm 2 days                    Physical Exam  Constitutional:       General: She is not in acute distress.     Appearance: Normal appearance. She is well-developed. She is not ill-appearing or toxic-appearing.   Eyes:      Conjunctiva/sclera:      Right eye: Right conjunctiva is not injected. No hemorrhage.     Left eye: Left conjunctiva is not injected. No hemorrhage.  Neck:      Vascular: No JVD.   Cardiovascular:      Rate and Rhythm: Normal rate and regular rhythm.      Heart sounds: S1 normal and S2 normal. No murmur heard.     No gallop.   Pulmonary:      Effort: Pulmonary effort is normal.      Breath sounds: Normal breath sounds.   Chest:      Chest wall: No swelling or tenderness.   Abdominal:      Tenderness: There is no abdominal tenderness.   Musculoskeletal:      Right ankle: No swelling.      Left ankle: No swelling.   Skin:     General: Skin is warm and dry.      Findings: No rash.   Neurological:      General: No focal deficit present.      Mental Status: She is alert and oriented to person, place, and  time. She is not disoriented.   Psychiatric:         Attention and Perception: Attention normal.         Mood and Affect: Mood normal.         Speech: Speech normal.         Behavior: Behavior normal.         Thought Content: Thought content normal.         Cognition and Memory: Cognition and memory normal.         Judgment: Judgment normal.       Current Medications:     gabapentin  300 mg Oral TID    lactobacillus acidophilus & bulgar  1 packet Oral BID    LIDOcaine  1 patch Transdermal Daily    mupirocin   Nasal BID    valACYclovir  1,000 mg Oral BID    vancomycin (VANCOCIN) IV (PEDS and ADULTS)  1,000 mg Intravenous Q12H     Current Laboratory Results:    Recent Results (from the past 24 hours)   VANCOMYCIN, TROUGH    Collection Time: 06/17/25 12:50 PM   Result Value Ref Range    Vancomycin Trough 27.4 (H) 10.0 - 22.0 ug/ml   Vancomycin, Random    Collection Time: 06/18/25 12:53 AM   Result Value Ref Range    Vancomycin Random 14.8 Not established ug/ml   Basic Metabolic Panel    Collection Time: 06/18/25  6:08 AM   Result Value Ref Range    Sodium 135 (L) 136 - 145 mmol/L    Potassium 4.9 3.5 - 5.1 mmol/L    Chloride 105 95 - 110 mmol/L    CO2 17 (L) 23 - 29 mmol/L    Glucose 89 70 - 110 mg/dL    BUN 15 6 - 20 mg/dL    Creatinine 0.9 0.5 - 1.4 mg/dL    Calcium 9.0 8.7 - 10.5 mg/dL    Anion Gap 13 8 - 16 mmol/L    eGFR >60 >60 mL/min/1.73/m2   Magnesium    Collection Time: 06/18/25  6:08 AM   Result Value Ref Range    Magnesium  1.8 1.6 - 2.6 mg/dL   CBC with Differential    Collection Time: 06/18/25  6:08 AM   Result Value Ref Range    WBC 9.01 3.90 - 12.70 K/uL    RBC 3.58 (L) 4.00 - 5.40 M/uL    HGB 9.7 (L) 12.0 - 16.0 gm/dL    HCT 30.8 (L) 37.0 - 48.5 %    MCV 86 82 - 98 fL    MCH 27.1 27.0 - 31.0 pg    MCHC 31.5 (L) 32.0 - 36.0 g/dL    RDW 15.6 (H) 11.5 - 14.5 %    Platelet Count      MPV      Nucleated RBC 0 <=0 /100 WBC    Neut % 45.2 38 - 73 %    Lymph % 44.4 18 - 48 %    Mono % 8.0 4 - 15 %    Eos % 1.7  <=8 %    Basophil % 0.4 <=1.9 %    Imm Grans % 0.3 0.0 - 0.5 %    Neut # 4.07 1.8 - 7.7 K/uL    Lymph # 4.00 1 - 4.8 K/uL    Mono # 0.72 0.3 - 1 K/uL    Eos # 0.15 <=0.5 K/uL    Baso # 0.04 <=0.2 K/uL    Imm Grans # 0.03 0.00 - 0.04 K/uL   Platelet Review    Collection Time: 06/18/25  6:08 AM   Result Value Ref Range    Platelet Estimate Clumped (A)       Current Imaging Results:    CT Thigh with Contrast Bilateral   Final Result   IMPRESSION:    1.8 cm low density nonenhancing lesion medial to the right femoral medial condyle. Findings may represent small subcutaneous abscess from dermal injury or insect bite. Focal hematoma is felt to be much less likely. This is not felt to be tumor.      Areas of subcutaneous edema in both lower extremities and scattered areas of subcutaneous air. Findings raise the question of injections versus trauma.      Left knee effusion.      Extensive bilateral lower extremity varicosities, worse on the right.      No CT evidence for deep venous thrombosis.      -Electronically Signed By: Madison Lazcano MD    -Electronically Signed On:  6/13/2025 11:51 PM         Report Ends      X-Ray Chest AP Portable   Final Result      No acute abnormality.         Electronically signed by: Enmanuel Dukes   Date:    06/14/2025   Time:    01:10      CT Leg (Tibia-Fibula) with Contrast Bilateral    (Results Pending)     6/16/2025: Echo:  Left Ventricle: The left ventricle is normal in size. Ventricular mass is normal. Normal wall thickness. There is normal systolic function. Ejection fraction is approximately 65%. Global longitudinal strain is normal measuring -18.2%. There is normal diastolic function. Normal left ventricular filling pressure. Tissue Doppler velocity is normal.  Right Ventricle: The right ventricle is normal in size Wall thickness is normal. Systolic function is normal.  Pulmonary Artery: The estimated pulmonary artery systolic pressure is 32 mmHg.  IVC/SVC: Normal venous pressure at  3 mmHg.    Assessment and Plan:     Problem List:    Active Diagnoses:    Diagnosis Date Noted POA    PRINCIPAL PROBLEM:  Staphylococcal arthritis of left knee [M00.062] 06/14/2025 Yes    Anemia [D64.9] 06/16/2025 Yes    Obesity [E66.9] 06/16/2025 Yes    Sepsis [A41.9] 06/15/2025 Yes    Methicillin resistant Staphylococcus aureus infection [A49.02] 06/15/2025 Yes    Bacteremia due to methicillin resistant Staphylococcus aureus [R78.81, B95.62] 06/15/2025 Yes    Intravenous drug abuse, continuous [F19.10] 06/15/2025 Yes    Opioid use disorder [F11.90] 06/15/2025 Yes    Diarrhea [R19.7] 06/14/2025 Yes      Problems Resolved During this Admission:    Diagnosis Date Noted Date Resolved POA    Abscess of right lower extremity [L02.415] 06/14/2025 06/15/2025 Yes     Assessment and Plan:     Staph Bacteremia  Cellulitis of left leg and septic arthritis.  Several positive blood cultures.  6/16/2025: Echo: Normal left ventricular size and systolic function. EF 65%. No vegetations.               6/18/2025: Plan [facility]Y. All issues discussed. Consent signed.     The planned procedure was discussed in detail with the patient and the family members present. Risk, benefit and alternatives were reviewed. All questions answered. Consent was then signed.     If further questions or concerns arise the patient was encouraged to contact me prior to the planned procedure.       VTE Risk Mitigation (From admission, onward)           Ordered     IP VTE HIGH RISK PATIENT  Once         06/14/25 0224     Place sequential compression device  Until discontinued         06/14/25 0224     Reason for No Pharmacological VTE Prophylaxis  Once        Question:  Reasons:  Answer:  Risk of Bleeding    06/14/25 0224                    Deandre Reynolds MD  Cardiology  [facility] ([address])
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
## 2025-06-18 NOTE — PLAN OF CARE
Problem: Occupational Therapy  Goal: Occupational Therapy Goal  Description: Goals to be met by: 6/25/2025     Patient will increase functional independence with ADLs by performing:    UE Dressing with Modified [place].  LE Dressing with Modified [place].  Grooming for 3 tasks while standing at sink with Modified [place].  Toileting from toilet with Modified [place] for hygiene and clothing management.   Bathing from  shower chair/bench with Modified [place].  Toilet transfer to toilet with Modified [place].    Outcome: Progressing     Initial OT eval/treat complete.  No DME in use PTA.  Will defer AD needs to PT.  Needs TTB currently though will continue to assess needs.  Recommend post acute Low Intensity therapy currently though may progress to no post acute OT needs.  Lives with mother, father, and 8 y.o. son.  To benefit from continued acute care OT services to increase independence in self-care/functional transfers.  OT to follow.
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
## 2025-06-18 NOTE — ASSESSMENT & PLAN NOTE
Leslie Gann is a 45 year old woman who injects drugs who was admitted 6/13 due to bilateral leg pain. She injects cocaine and heroin into her legs and has skin wounds at injection sites. She reports she developed worsening L knee pain prompting admission. Blood cultures grew MRSA. TTE negative for IE. Underwent arthrocentesis with orthopedics, cell count 36K with 95 segs. Cultures with staph aureus. Underwent debridement with orthopedics on 6/16. Underwent CITLALY, negative for IE. Repeat blood cultures are no growth x 48 hours. She feels strongly about returning home to take care of her 8 year old son with the assistance of her parents. She plans to attend outpatient rehab until her  returns home in September and then she intends to enter inpatient rehab. She is committed to avoiding drugs to prolong her life and be there for her son. We discussed my general hesitancy to discharge her with a PICC line given her history of injection drug use with a PICC line. She understands this, but is committed to maintaining sobriety while on IV antibiotics. We discussed that if there is concern for ongoing drug use during her antibiotic course, we will have to remove her PICC line. She agrees to signing a behavioral contract with [facility]. Daptomycin MELANI is 2, so will have to use vancomycin at discharge.     Recommendations  - continue vancomycin goal trough 15-20 mcg/ml with dosing per pharmacy   - if blood cultures no growth x 72 hours, okay to place PICC line   - long conversation regarding severe illness secondary to infection if she uses injection drugs with PICC line in   - see below for OPAT    Outpatient Antibiotic Therapy Plan:    Please send referral to [facility].    1) Infection: MRSA bacteremia and L septic knee     2) Discharge Antibiotics: vancomycin     Intravenous antibiotics:  1 gram IV q 12 hours     3) Therapy Duration:  6 weeks     Estimated end date of  IV antibiotics: 7/27/2025    4) Outpatient Weekly Labs:    Order the following labs to be drawn on Mondays:   CBC  CMP   CRP  Vancomycin trough. Target 15-20    If discharged on vancomycin IV, order the following additional labs to be drawn on Thursdays:  CMP   Vancomycin trough. Target 15-20    If vancomycin trough is not at target (15-20) prior to discharge, schedule vancomycin trough to be drawn before their fourth outpatient dose.    5) Fax Lab Results to Infectious Diseases Provider: [facility] Fax Number: [phone]    6) Outpatient Infectious Diseases Follow-up    Follow-up appointment will be arranged by the ID clinic and will be found in the patient's appointments tab.    Prior to discharge, please ensure the patient's follow-up has been scheduled.    If there is still no follow-up scheduled prior to discharge, please send an [place] message to [facility] Clinical Pool or Call Infectious Diseases Dept.

## 2025-06-18 NOTE — PROGRESS NOTES
Pharmacokinetic Assessment Follow Up: IV Vancomycin    Vancomycin serum concentration assessment(s):    The random level was drawn correctly and can be used to guide therapy at this time. The measurement is within the desired definitive target range of 10 to 20 mcg/mL.    Vancomycin Regimen Plan:    Change regimen to Vancomycin 1000 mg IV every 12 hours with next serum trough concentration measured at 1530 prior to 4th dose on 6/19    Drug levels (last 3 results):  Recent Labs   Lab Result Units 06/16/25  0008 06/17/25  1250 06/18/25  0053   Vancomycin Random ug/ml  --   --  14.8   Vancomycin Trough ug/ml 20.5 27.4*  --        Pharmacy will continue to follow and monitor vancomycin.    Please contact pharmacy at extension [phone] for questions regarding this assessment.    Thank you for the consult,   Mikaela Sheffield       Patient brief summary:  Leslie Gann is a 45 y.o. female initiated on antimicrobial therapy with IV Vancomycin for treatment of bone/joint infection-Septic Arthritis     The patient's current regimen is 1500 mg q12h - held x 1 dose d/t LUIS - Scr improving, resume scheduled dosing at 1000 mg q12h    Drug Allergies:   Review of patient's allergies indicates:  No Known Allergies    Actual Body Weight:   97.5 kg    Renal Function:   Estimated Creatinine Clearance: 67.7 mL/min (based on SCr of 1.2 mg/dL).,     Dialysis Method (if applicable):  N/A    CBC (last 72 hours):  Recent Labs   Lab Result Units 06/15/25  0803 06/16/25  0429 06/17/25  0454   WBC K/uL 8.71 7.63 9.98   HGB gm/dL 9.7* 10.4* 11.2*   HCT % 29.9* 33.4* 35.6*   Platelet Count K/uL 367 417 461*   Lymph % % 45.9 26.7 46.5   Mono % % 12.2 3.7* 6.8   Eos % % 3.1 0.0 1.2   Basophil % % 0.3 0.1 0.5       Metabolic Panel (last 72 hours):  Recent Labs   Lab Result Units 06/15/25  0803 06/16/25  0429 06/17/25  0454   Sodium mmol/L 140 137 140   Potassium mmol/L 4.1 4.7 4.1   Chloride mmol/L 109 109 108   CO2 mmol/L 22* 18* 19*   Glucose  mg/dL 116* 161* 102   BUN mg/dL 8 16 19   Creatinine mg/dL 0.9 1.0 1.2   Magnesium  mg/dL 2.1 2.3 2.2   Phosphorus Level mg/dL 3.2 3.7  --        Vancomycin Administrations:  vancomycin given in the last 96 hours                     vancomycin 1,500 mg in 0.9% NaCl 250 mL IVPB (admixture device) (mg) 1,500 mg New Bag 06/17/25 0142     1,500 mg New Bag 06/16/25 1424     1,500 mg New Bag  0109    vancomycin 1,750 mg in 0.9% NaCl 500 mL IVPB (admixture device) (mg) 1,750 mg New Bag 06/15/25 1130     1,750 mg New Bag 06/14/25 2348     1,750 mg New Bag  1000                    Microbiologic Results:  Microbiology Results (last 7 days)       Procedure Component Value Units Date/Time    Blood culture [[id number]]  (Normal) Collected: 06/16/25 0726    Order Status: Completed Specimen: Blood Updated: 06/18/25 0007     Blood Culture No Growth After 36 Hours    Blood culture [[id number]]  (Normal) Collected: 06/16/25 0726    Order Status: Completed Specimen: Blood Updated: 06/18/25 0007     Blood Culture No Growth After 36 Hours    Blood culture x two cultures. Draw prior to antibiotics. [[id number]]  (Abnormal) Collected: 06/13/25 1748    Order Status: Completed Specimen: Blood from Peripheral, Upper Arm, Left Updated: 06/17/25 1110     Blood Culture Methicillin resistant Staphylococcus aureus     GRAM STAIN Gram positive cocci in clusters resembling Staph     Comment: Aerobic Bottle Positive        Narrative:        For susceptibility see accession number  [id number]      Blood culture x two cultures. Draw prior to antibiotics. [[id number]]  (Abnormal)  (Susceptibility) Collected: 06/13/25 1834    Order Status: Completed Specimen: Blood from Peripheral, Upper Arm, Right Updated: 06/17/25 1106     Blood Culture Methicillin resistant Staphylococcus aureus     GRAM STAIN Gram positive cocci in clusters resembling Staph     Comment: Aerobic Bottle Positive       Narrative:         ID Consult Strongly Recommended    Culture,  Anaerobe [[id number]] Collected: 06/15/25 0822    Order Status: Completed Specimen: Body Fluid from Knee, Left Updated: 06/17/25 0733     Anaerobe Culture Culture In Progress    Aerobic culture [[id number]] Collected: 06/15/25 0822    Order Status: Completed Specimen: Aspirate from Knee, Left Updated: 06/17/25 0733     CULTURE, AEROBIC No Growth To Date    Culture, Body Fluid (Aerobic) w/ GS [[id number]] Collected: 06/15/25 1555    Order Status: Completed Specimen: Body Fluid from Knee, Left Updated: 06/17/25 0733     CULTURE, FLUID No Growth To Date     GRAM STAIN No WBCs      No organisms seen    Aerobic culture [[id number]] Collected: 06/15/25 1555    Order Status: Sent Specimen: Body Fluid from Knee, Left     Culture, Body Fluid (Aerobic) w/ GS [[id number]]     Order Status: Sent Specimen: Body Fluid from Knee, Left     MRSA/SA Rapid ID by PCR from Blood culture [[id number]]  (Abnormal) Collected: 06/13/25 1834    Order Status: Completed Specimen: Blood from Peripheral, Upper Arm, Right Updated: 06/15/25 0513     Staph aureus ID by PCR Positive     MRSA ID by PCR Positive    Stool culture [[id number]]     Order Status: Canceled Specimen: Stool     Clostridium difficile EIA [[id number]]     Order Status: Canceled Specimen: Stool

## 2025-06-18 NOTE — SUBJECTIVE & OBJECTIVE
Interval History:  No acute events overnight.  Seen after CITLALY this morning.  In good spirits and hopeful to be able to go home soon.  Discussed that I'm not sure if she will be a candidate for home IV antibiotics - await input from ID.  Pain is well managed at this time.  I have not witnessed any drug seeking behavior during her hospitalization.  Reports desire to do outpatient substance treatment once discharged.  All questions answered and patient had no further complaints.    Objective:     Vital Signs (Most Recent):  Temp: 99.3 °F (37.4 °C) (06/18/25 0755)  Pulse: 100 (06/18/25 0857)  Resp: 18 (06/18/25 0755)  BP: 124/74 (06/18/25 0857)  SpO2: 100 % (06/18/25 0857) Vital Signs (24h Range):  Temp:  [98.1 °F (36.7 °C)-99.3 °F (37.4 °C)] 99.3 °F (37.4 °C)  Pulse:  [] 100  Resp:  [16-20] 18  SpO2:  [93 %-100 %] 100 %  BP: (112-166)/(72-94) 124/74     Weight: 97.1 kg (214 lb)  Body mass index is 35.61 kg/m².    Intake/Output Summary (Last 24 hours) at 6/18/2025 1007  Last data filed at 6/18/2025 0639  Gross per 24 hour   Intake 180 ml   Output 1 ml   Net 179 ml         Physical Exam  Constitutional:       General: She is not in acute distress.     Appearance: She is obese. She is not ill-appearing or toxic-appearing.   HENT:      Head: Atraumatic.      Mouth/Throat:      Mouth: Mucous membranes are moist.   Eyes:      Extraocular Movements: Extraocular movements intact.   Cardiovascular:      Rate and Rhythm: Regular rhythm. Tachycardia present.      Heart sounds: Normal heart sounds. No murmur heard.  Pulmonary:      Effort: Pulmonary effort is normal.      Breath sounds: Normal breath sounds. No wheezing.   Abdominal:      General: Bowel sounds are normal. There is no distension.      Palpations: Abdomen is soft.      Tenderness: There is no abdominal tenderness.   Musculoskeletal:         General: Swelling and tenderness present. No deformity. Normal range of motion.      Cervical back: Normal range of  motion.      Comments: Left knee in post-surgical dressing   Skin:     Comments: Multiple small areas of induration on her skin   Neurological:      Mental Status: She is alert and oriented to person, place, and time. Mental status is at baseline.   Psychiatric:         Mood and Affect: Mood normal.         Behavior: Behavior normal.               Significant Labs: All pertinent labs within the past 24 hours have been reviewed.    Significant Imaging: I have reviewed all pertinent imaging results/findings within the past 24 hours.

## 2025-06-18 NOTE — PRE ADMISSION SCREENING
Reason of Admission:    Staphylococcal arthritis of left knee       Diarrhea       Sepsis       Methicillin resistant Staphylococcus aureus infection       Bacteremia due to methicillin resistant Staphylococcus aureus       Intravenous drug abuse, continuous       Opioid use disorder       Anemia       Obesity        LTACH Admission Criteria:    Management of at least one of the following complex medical conditions:  Other Staphylococcal arthritis of left knee    Must meet at least three of the following concomitant treatments/intervention daily unless notes: (excludes PO meds unless notes)  IV medication per therapeutic regimen  Cardiac Monitoring  Laboratory assessment and medication adjustment(s)  Rehab Therapy (PT/OT/ST) 1-3 hours a day greater than or equal to 5 days a week      LTACH more appropriate than other levels of care (eg, skilled nursing facility, home health care), as indicated by:    Clinical management of patient deemed too frequent and needed beyond the capabilities of alternative levels of care as evidence by: Frequency of IV medications greater than or equal to 2 times daily  Frequent diagnostic services needed on an inpatient basis, including clinical assessments, laboratory, and imaging as evidence by: Frequent monitoring and clinical assessments performed by a licensed RN to identify current and future patient needs by incorporating the recognition of normal vs abnormal body physiology, and to prompt recognition of pertinent changes to identify and prioritize appropriate interventions that can be performed within the acute inpatient setting.  and Frequent laboratory testing and/or imaging to aide in the improvement and effectiveness of patient's individualized treatment plan.   More intensive services, such as speciality nursing care, and/or onsite physician assessments needed that are not available at a lower level of care as evidence by: Daily physician intervention ,  Collaboration between consulting and attending providers still deemed a necessity to aide in the improvement and effectiveness of patient's individualized treatment plan, which can be provided at an LTACH level of care. , and Therapy Services to be included in patient's treatment plan in an effort to restore/improve patient's modality status to a safe level of functioning prior to acute illness.    Lower level of care has failed (eg, patient readmitted to acute care from a lower level of care).     [facility], as indicated by ALL of the following:      Hypotension Absent     Cardiovascular status stable     Stable chest findings     Renal function accepctable   Pain adequately managed    Intake acceptable       No acute significant hepatic dysfunction (eg, new encephalopathy)   No acute severe unstable neurologic abnormalities (eg, Altered mental status that is severe or persistent, or evidence of ongoing CNS embolization or ischemia, worsening hydrocephalus)   No active bleeding or unstable disorders of hemostasis (eg, no recent need for transfusion, severe thrombocytopenia with bleeding)   No need for respiratory or other isolation, OR manageable at LTACH level of care    Long-term enteral feeding (eg, PEG) and intravenous access established, not needed, OR to be placed at [facility] level [facility]      Anticipated Discharge Disposition:    Home with outpatient substance abuse treatment       Referring Physician  KJ MARSH MD     Facility Status: Accept     Admitting Physician:  Trey Sexton MD    Primary Care Physician:  Cailin Shirley, GIANCARLOP-C    History         Patient Active Problem List    Diagnosis Date Noted    Anemia 06/16/2025    Obesity 06/16/2025    Sepsis 06/15/2025    Methicillin resistant Staphylococcus aureus infection 06/15/2025    Bacteremia due to methicillin resistant Staphylococcus aureus 06/15/2025    Intravenous drug abuse, continuous 06/15/2025    Opioid  "use disorder 06/15/2025    Staphylococcal arthritis of left knee 06/14/2025    Diarrhea 06/14/2025    Intractable nausea and vomiting 08/23/2024    Medication adverse effect 08/23/2024    Bilateral lower extremity edema 06/11/2020         Previous Specialties/Consulted physicians:      Cardiology , General Surgery , and Other: ORTHOPEDIC SURGERY       Past and Current Medical History    Past Medical History:   Diagnosis Date    Bilateral ovarian cysts     Helicobacter pylori (H. pylori)     Migraine headache     Polycystic ovary syndrome     Seizures            History of Present Illness     "The patient is a 45 y.o. female with medical history of IV drug abuse (last injected 1 hour prior to coming to the ED), recent shingles infection, and leg wounds who presents with bilateral leg pain with associated redness and erythema.  Patient states she does "inject dope and cocaine and has multiple abscesses due to injection sites.  Patient states increased pain in her right knee with difficulty ambulating.  Patient endorsing fever and chills.  No nausea or vomiting.  CT positive for small abscess to right femoral medial condyle and various areas of subq air likely due to injection sites.  She will be admitted for further management of her right knee abscess."     Overview/Hospital Course:  Patient is a 45 year woman with and intravenous drug use presented with diffuse body pain involving multiple areas of redness erythema along with left knee pain.     Patient with evidence of sepsis secondary to skin soft tissue infection and also now concern for left knee infection as left knee more painful and swollen.  Blood cultures positive for Staphylococcus aureus (PCR testing consistent with MRSA).     NPO.  Adjusted pain medication for better pain control.  Orthopedic surgery consulted for evaluation aspiration and joint/washout.     Interval History:  No acute events overnight.  Seen after CITLALY this morning.  In good spirits and " hopeful to be able to go home soon.  Discussed that I'm not sure if she will be a candidate for home IV antibiotics - await input from ID.  Pain is well managed at this time.  I have not witnessed any drug seeking behavior during her hospitalization.  Reports desire to do outpatient substance treatment once discharged.  All questions answered and patient had no further complaints.         Patient Traveled outside of the [place] in the last 3 months? no     Patient discharged from this LTAC to SNF within the last 45 days? no    Patient discharged from this LTAC to Rehab within the last 27 days? no    Prior residence: home    Prior Post-Acute Services: N/A     Allergies: Review of patient's allergies indicates:  No Known Allergies    Has patient received the current influenza vaccine (Oct 1 - March 31)? Unknown     Has patient received PPD skin test prior to admit? N/A     Code Status: Full Code    Orientation: Time, Place, Person, and Events    Speech: normal     Vital Signs:     Date 6/18/2025    Blood Pressure 121/80    Pulse 106    Respiratory Rate 20    O2 Saturation 95%    Temperature 97.9        Bowel/Bladder: continent of bladder and continent of bowel    Dialysis: N/A         Peripheral IV - Single Lumen 06/15/25 1500 22 G Anterior;Right Forearm (Active)   Site Assessment Dry;Intact 06/18/25 1200   Line Securement Device Secured with sutureless device 06/18/25 0720   Extremity Assessment Distal to IV No abnormal discoloration;No redness;No swelling;No warmth 06/18/25 0720   Line Status Saline locked 06/18/25 1200   Dressing Status Dry;Intact 06/18/25 1200   Dressing Intervention Integrity maintained 06/18/25 1200   Dressing Change Due 06/19/25 06/17/25 2030   Site Change Due 06/22/25 06/17/25 2030   Reason Not Rotated Not due 06/17/25 2030   Number of days: 2            Midline Catheter - Single Lumen 06/15/25 1820 Right basilic vein (medial side of arm) other (see comments) (Active)   Site Assessment  Clean;Dry;Intact 06/18/25 0720   IV Device Securement catheter securement device 06/18/25 0720   Line Status Saline locked 06/18/25 0720   Extremity Circumference (cm) 36 cm 06/15/25 1820   Dressing Type CHG impregnated dressing/sponge 06/18/25 0720   Dressing Status Clean;Dry;Intact 06/18/25 0720   Dressing Intervention Integrity maintained 06/18/25 0720   Dressing Change Due 06/22/25 06/18/25 0720   Site Change Due 07/13/25 06/18/25 0720   Reason Not Rotated Not due 06/18/25 0720   Number of days: 2       CBGs/Accuchecks: No     Precautions: N/A    Restraints: No     Isolation Precautions: Contact Isolation       Facility-Administered Medications as of 6/18/2025   Medication Dose Route Frequency Provider Last Rate Last Admin    [COMPLETED] acetaminophen tablet 1,000 mg  1,000 mg Oral ED 1 Time Susy Heart NP   1,000 mg at 06/13/25 1921    acetaminophen tablet 1,000 mg  1,000 mg Oral Q8H PRN Deandre Reynolds MD   1,000 mg at 06/18/25 1007    [COMPLETED] buprenorphine-naloxone 8-2 SL tablet 8 mg  8 mg Sublingual ED 1 Time Gelacio Hung MD   8 mg at 06/14/25 0141    [COMPLETED] diphenoxylate-atropine 2.5-0.025 mg per tablet 1 tablet  1 tablet Oral ED 1 Time Gelacio Hung MD   1 tablet at 06/14/25 0039    [COMPLETED] fluconazole tablet 150 mg  150 mg Oral Once Rakesh Morrell NP   150 mg at 06/16/25 0252    gabapentin capsule 600 mg  600 mg Oral TID [place] MD        [COMPLETED] ibuprofen tablet 600 mg  600 mg Oral ED 1 Time Susy Heart NP   600 mg at 06/13/25 2018    [COMPLETED] iohexoL (OMNIPAQUE 350) injection 100 mL  100 mL Intravenous ONCE PRN Yvonne Henderson MD   100 mL at 06/13/25 2310    [COMPLETED] ketorolac injection 15 mg  15 mg Intravenous ED 1 Time Susy Heart NP   15 mg at 06/13/25 1927    lactobacillus acidophilus & bulgar 100 million cell packet 1 each  1 packet Oral BID Deandre Reynolds MD   1 each at 06/17/25 2029    LIDOcaine 5 % patch 1 patch  1 patch  Transdermal Daily Deandre Reynolds MD        loperamide capsule 2 mg  2 mg Oral QID PRN Deandre Reynolds MD        [COMPLETED] morphine injection 4 mg  4 mg Intravenous ED 1 Time Susy Heart NP   4 mg at 06/13/25 1808    morphine injection 6 mg  6 mg Intravenous Q4H PRN Deandre Reynolds MD   6 mg at 06/17/25 2047    [COMPLETED] morphine injection 6 mg  6 mg Intravenous Once Trey Sexton MD   6 mg at 06/15/25 1433    mupirocin 2 % ointment   Nasal BID Deandre Reynolds MD   Given at 06/17/25 2031    naloxone 0.4 mg/mL injection 0.02 mg  0.02 mg Intravenous PRN Deandre Reynolds MD        [COMPLETED] ondansetron injection 4 mg  4 mg Intravenous ED 1 Time Susy Heart NP   4 mg at 06/13/25 1805    ondansetron injection 4 mg  4 mg Intravenous Q8H PRN Deandre Reynolds MD        oxyCODONE immediate release tablet Tab 10 mg  10 mg Oral Q4H PRN Deandre Reynolds MD   10 mg at 06/18/25 0436    [COMPLETED] piperacillin-tazobactam (ZOSYN) 4.5 g in D5W 100 mL IVPB (MB+)  4.5 g Intravenous ED 1 Time Susy Heart NP   Stopped at 06/13/25 1948    simethicone chewable tablet 80 mg  1 tablet Oral TID PRN Deandre Reynolds MD   80 mg at 06/17/25 1813    [COMPLETED] sodium chloride 0.9% bolus 1,000 mL 1,000 mL  1,000 mL Intravenous ED 1 Time Susy Heart NP   Stopped at 06/13/25 2005    sodium chloride 0.9% flush 10 mL  10 mL Intravenous Q12H PRN Deandre Reynolds MD        [COMPLETED] Tdap vaccine injection 0.5 mL  0.5 mL Intramuscular ED 1 Time Susy Heart NP   0.5 mL at 06/13/25 1922    valACYclovir tablet 1,000 mg  1,000 mg Oral BID Deandre Reynolds MD   1,000 mg at 06/18/25 1007    vancomycin (VANCOCIN) 1,000 mg in 0.9% NaCl 250 mL IVPB (admixture device)  1,000 mg Intravenous Q12H Deandre Reynolds MD   Stopped at 06/18/25 0606    vancomycin - pharmacy to dose   Intravenous pharmacy to manage frequency Deandre Reynolds MD        [COMPLETED] vancomycin 2 g in 0.9% sodium chloride 500 mL IVPB  2,000 mg  "Intravenous Once Susy Heart NP   Stopped at 06/13/25 2212     Outpatient Medications as of 6/18/2025   Medication Sig Dispense Refill    famotidine (PEPCID) 20 MG tablet Take 20 mg by mouth 2 (two) times daily.      gabapentin (NEURONTIN) 800 MG tablet Take 800 mg by mouth 3 (three) times daily.      HYDROcodone-acetaminophen (NORCO)  mg per tablet Take 1 tablet by mouth every 6 (six) hours as needed for Pain. 12 tablet 0    ibuprofen (ADVIL,MOTRIN) 600 MG tablet Take 1 tablet (600 mg total) by mouth every 6 (six) hours as needed for Pain. 20 tablet 0    LIDOcaine-prilocaine (EMLA) cream Apply topically as needed (pain). 30 g 0    methadone HCl (METHADONE ORAL) Take by mouth.      metoclopramide HCl (REGLAN) 10 MG tablet Take 1 tablet (10 mg total) by mouth every 6 (six) hours. 30 tablet 0        Cardiovascular:    Cardiovascular Review: Requires Review    Telemetry: Yes    Rhythm: SINUS TACHYCARDIA     AICD: No      Respiratory:    Oxygen: 2 L NC     CPT/Frequency: N/A    Incentive Spirometer/Frequency: N/A    CPAP/Settings: N/A    BiPAP/Settings: N/A    Oxygen Saturation: 95% ON 2 LPM NC     Suction Frequency: N/A      Vent Settings:   Mode:   Rate:   TV:   FIO2:   PEEP:   PS:   iTime:    Trial/HS Settings:   Mode:   Rate:   TV:   FIO2:   PEEP:   PS:       Nutrition:      Ht Readings from Last 3 Encounters:   06/18/25 5' 5" (1.651 m)   05/21/25 5' 5" (1.651 m)   05/17/25 5' 5" (1.651 m)     Wt Readings from Last 1 Encounters:   06/18/25 0857 97.1 kg (214 lb)   06/14/25 2153 97.5 kg (214 lb 15.2 oz)   06/14/25 2151 97.5 kg (214 lb 15.2 oz)   06/13/25 1648 97.5 kg (215 lb)       Feeding Status:   Current Diet: REGULAR DIET 800 ML FLUID RESTRICTION    Supplements:N/A   Tube Feeding: N/A   Flushes: N/A      Integumentary:    Integumentary: Surgical Incisions              Wound 06/15/25 1600 Incision Left anterior Knee (Active)   06/15/25 1600 Knee   Present on Original Admission:    Primary Wound Type: " Incision   Side: Left   Orientation: anterior   Wound Approximate Age at First Assessment (Weeks):    Wound Number:    Is this injury device related?:    Incision Type:    Closure Method:    Wound Description (Comments):    Type:    Additional Comments:    Ankle-Brachial Index:    Pulses:    Removal Indication and Assessment:    Wound Outcome:    Dressing Appearance Open to air 06/18/25 0720   Drainage Amount None 06/18/25 0720   Drainage Characteristics/Odor No odor 06/18/25 0720   Appearance Dressing in place, unable to visualize 06/17/25 2030   Dressing Elastic bandage 06/17/25 2030   Number of days: 3         Musculoskeletal:    Transfer assist: Stand-by Assistance    Weight Bearing Status: As Tolerated    Comments: N/A    ADL Assist: Stand-by Assistance    Special Equipment: N/A    Radiology:    Radiology (Last 168 hours)               06/18 0901 Transesophageal echo (CITLALY)       06/18 0838 Intra-Procedure Documentation       06/16 1408 Echo       06/13 2311 CT Leg (Tibia-Fibula) with Contrast Bilateral       06/13 2309 CT Thigh with Contrast Bilateral       06/13 2306 X-Ray Chest AP Portable              ..  Transesophageal echo (CITLALY)    Left Ventricle: The left ventricle is normal in size. Normal wall   thickness. There is normal systolic function. Ejection fraction is   approximately 65%.    Right Ventricle: The right ventricle is normal in size. Wall thickness   is normal. Systolic function is normal.    Mitral Valve: There is mild regurgitation with a centrally directed   jet.    Unremarkable valves. No vegetations seen.  Intra-Procedure Documentation  CITLALY performed in the Invasive Lab    - See Procedure Log link below for nursing documentation    - See CITLALY order on Card Proc Tab for physician findings       Lab/Cultures:    BUN   Date Value Ref Range Status   06/18/2025 15 6 - 20 mg/dL Final   06/17/2025 19 6 - 20 mg/dL Final     Creatinine   Date Value Ref Range Status   06/18/2025 0.9 0.5 - 1.4 mg/dL  "Final   06/17/2025 1.2 0.5 - 1.4 mg/dL Final     WBC   Date Value Ref Range Status   06/18/2025 9.01 3.90 - 12.70 K/uL Final   06/17/2025 9.98 3.90 - 12.70 K/uL Final      Blood Culture   Date Value Ref Range Status   06/16/2025 No Growth After 48 Hours  Preliminary   06/16/2025 No Growth After 48 Hours  Preliminary     Urine Culture, Routine   Date Value Ref Range Status   05/05/2016 No growth  Final     No results for input(s): "PH", "PCO2", "PO2", "HCO3", "POCSATURATED", "BE" in the last 72 hours.       "
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
## 2025-06-19 PROBLEM — L03.90 CELLULITIS: Status: ACTIVE | Noted: 2025-06-19

## 2025-06-19 LAB
ABSOLUTE EOSINOPHIL (OHS): 0.27 K/UL
ABSOLUTE MONOCYTE (OHS): 0.79 K/UL (ref 0.3–1)
ABSOLUTE NEUTROPHIL COUNT (OHS): 2.94 K/UL (ref 1.8–7.7)
ANION GAP (OHS): 15 MMOL/L (ref 8–16)
BACTERIA FLD AEROBE CULT: NO GROWTH
BACTERIA SPEC AEROBE CULT: ABNORMAL
BASOPHILS # BLD AUTO: 0.04 K/UL
BASOPHILS NFR BLD AUTO: 0.5 %
BUN SERPL-MCNC: 10 MG/DL (ref 6–20)
CALCIUM SERPL-MCNC: 9.5 MG/DL (ref 8.7–10.5)
CHLORIDE SERPL-SCNC: 103 MMOL/L (ref 95–110)
CO2 SERPL-SCNC: 23 MMOL/L (ref 23–29)
CREAT SERPL-MCNC: 1 MG/DL (ref 0.5–1.4)
ERYTHROCYTE [DISTWIDTH] IN BLOOD BY AUTOMATED COUNT: 15.4 % (ref 11.5–14.5)
GFR SERPLBLD CREATININE-BSD FMLA CKD-EPI: >60 ML/MIN/1.73/M2
GLUCOSE SERPL-MCNC: 119 MG/DL (ref 70–110)
GRAM STN SPEC: NORMAL
GRAM STN SPEC: NORMAL
HCT VFR BLD AUTO: 31.2 % (ref 37–48.5)
HGB BLD-MCNC: 9.7 GM/DL (ref 12–16)
IMM GRANULOCYTES # BLD AUTO: 0.01 K/UL (ref 0–0.04)
IMM GRANULOCYTES NFR BLD AUTO: 0.1 % (ref 0–0.5)
LYMPHOCYTES # BLD AUTO: 3.95 K/UL (ref 1–4.8)
MAGNESIUM SERPL-MCNC: 2.1 MG/DL (ref 1.6–2.6)
MCH RBC QN AUTO: 26.4 PG (ref 27–31)
MCHC RBC AUTO-ENTMCNC: 31.1 G/DL (ref 32–36)
MCV RBC AUTO: 85 FL (ref 82–98)
NUCLEATED RBC (/100WBC) (OHS): 0 /100 WBC
PLATELET # BLD AUTO: 506 K/UL (ref 150–450)
PMV BLD AUTO: 8.5 FL (ref 9.2–12.9)
POTASSIUM SERPL-SCNC: 4.8 MMOL/L (ref 3.5–5.1)
RBC # BLD AUTO: 3.67 M/UL (ref 4–5.4)
RELATIVE EOSINOPHIL (OHS): 3.4 %
RELATIVE LYMPHOCYTE (OHS): 49.4 % (ref 18–48)
RELATIVE MONOCYTE (OHS): 9.9 % (ref 4–15)
RELATIVE NEUTROPHIL (OHS): 36.7 % (ref 38–73)
SODIUM SERPL-SCNC: 141 MMOL/L (ref 136–145)
VANCOMYCIN TROUGH SERPL-MCNC: 17.5 UG/ML (ref 10–22)
WBC # BLD AUTO: 8 K/UL (ref 3.9–12.7)

## 2025-06-19 PROCEDURE — 85025 COMPLETE CBC W/AUTO DIFF WBC: CPT | Performed by: INTERNAL MEDICINE

## 2025-06-19 PROCEDURE — 25000003 PHARM REV CODE 250: Performed by: HOSPITALIST

## 2025-06-19 PROCEDURE — 36415 COLL VENOUS BLD VENIPUNCTURE: CPT | Performed by: INTERNAL MEDICINE

## 2025-06-19 PROCEDURE — 21400001 HC TELEMETRY ROOM

## 2025-06-19 PROCEDURE — 94761 N-INVAS EAR/PLS OXIMETRY MLT: CPT

## 2025-06-19 PROCEDURE — 63600175 PHARM REV CODE 636 W HCPCS: Performed by: INTERNAL MEDICINE

## 2025-06-19 PROCEDURE — 25000003 PHARM REV CODE 250: Performed by: INTERNAL MEDICINE

## 2025-06-19 PROCEDURE — 80202 ASSAY OF VANCOMYCIN: CPT | Performed by: INTERNAL MEDICINE

## 2025-06-19 PROCEDURE — 99232 SBSQ HOSP IP/OBS MODERATE 35: CPT | Mod: ,,, | Performed by: INTERNAL MEDICINE

## 2025-06-19 PROCEDURE — 97535 SELF CARE MNGMENT TRAINING: CPT

## 2025-06-19 PROCEDURE — 83735 ASSAY OF MAGNESIUM: CPT | Performed by: INTERNAL MEDICINE

## 2025-06-19 PROCEDURE — 27000207 HC ISOLATION

## 2025-06-19 PROCEDURE — 82565 ASSAY OF CREATININE: CPT | Performed by: INTERNAL MEDICINE

## 2025-06-19 PROCEDURE — 97116 GAIT TRAINING THERAPY: CPT | Mod: CQ

## 2025-06-19 RX ORDER — OXYCODONE HYDROCHLORIDE 10 MG/1
10 TABLET ORAL EVERY 8 HOURS PRN
Qty: 15 TABLET | Refills: 0 | Status: SHIPPED | OUTPATIENT
Start: 2025-06-19 | End: 2025-06-20

## 2025-06-19 RX ORDER — HEPARIN 100 UNIT/ML
500 SYRINGE INTRAVENOUS
OUTPATIENT
Start: 2025-06-20

## 2025-06-19 RX ORDER — LIDOCAINE 50 MG/G
1 PATCH TOPICAL DAILY
Qty: 30 PATCH | Refills: 0 | Status: SHIPPED | OUTPATIENT
Start: 2025-06-20

## 2025-06-19 RX ORDER — ACETAMINOPHEN 500 MG
1000 TABLET ORAL EVERY 8 HOURS PRN
Start: 2025-06-19

## 2025-06-19 RX ORDER — L. ACIDOPHILUS/L.BULGARICUS 100MM CELL
1 GRANULES IN PACKET (EA) ORAL 2 TIMES DAILY
Qty: 30 TABLET | Refills: 0 | Status: SHIPPED | OUTPATIENT
Start: 2025-06-19

## 2025-06-19 RX ADMIN — VANCOMYCIN HYDROCHLORIDE 1000 MG: 1 INJECTION, POWDER, LYOPHILIZED, FOR SOLUTION INTRAVENOUS at 04:06

## 2025-06-19 RX ADMIN — VALACYCLOVIR HYDROCHLORIDE 1000 MG: 500 TABLET, FILM COATED ORAL at 08:06

## 2025-06-19 RX ADMIN — VANCOMYCIN HYDROCHLORIDE 1000 MG: 1 INJECTION, POWDER, LYOPHILIZED, FOR SOLUTION INTRAVENOUS at 03:06

## 2025-06-19 RX ADMIN — GABAPENTIN 600 MG: 300 CAPSULE ORAL at 03:06

## 2025-06-19 RX ADMIN — MORPHINE SULFATE 6 MG: 2 INJECTION, SOLUTION INTRAMUSCULAR; INTRAVENOUS at 05:06

## 2025-06-19 RX ADMIN — MORPHINE SULFATE 6 MG: 2 INJECTION, SOLUTION INTRAMUSCULAR; INTRAVENOUS at 09:06

## 2025-06-19 RX ADMIN — GABAPENTIN 600 MG: 300 CAPSULE ORAL at 08:06

## 2025-06-19 RX ADMIN — OXYCODONE HYDROCHLORIDE 10 MG: 10 TABLET ORAL at 01:06

## 2025-06-19 RX ADMIN — MORPHINE SULFATE 6 MG: 2 INJECTION, SOLUTION INTRAMUSCULAR; INTRAVENOUS at 11:06

## 2025-06-19 RX ADMIN — ACETAMINOPHEN 1000 MG: 500 TABLET, FILM COATED ORAL at 09:06

## 2025-06-19 NOTE — ASSESSMENT & PLAN NOTE
-Reported on admit  -Noted 1 episode overnight 6/16-6/17  -Started probiotic and prn imodium.  Resolved

## 2025-06-19 NOTE — DISCHARGE SUMMARY
"[facility] - [facility] Surg [address] Medicine  Discharge Summary      Patient Name: Leslie Gann  MRN: [id number]  ECHO: [id number]  Patient Class: IP- Inpatient  Admission Date: 6/13/2025  Hospital Length of Stay: 5 days  Discharge Date and Time: No discharge date for patient encounter.  Attending Physician: North Iyer MD   Discharging Provider: North Iyer MD  Primary Care Provider: Cailin Shirley FNP-C    Primary Care Team: Networked reference to record PCT     HPI:   Per Rakesh Morrell, NP:    "The patient is a 45 y.o. female with medical history of IV drug abuse (last injected 1 hour prior to coming to the ED), recent shingles infection, and leg wounds who presents with bilateral leg pain with associated redness and erythema.  Patient states she does "inject dope and cocaine and has multiple abscesses due to injection sites.  Patient states increased pain in her right knee with difficulty ambulating.  Patient endorsing fever and chills.  No nausea or vomiting.  CT positive for small abscess to right femoral medial condyle and various areas of subq air likely due to injection sites.  She will be admitted for further management of her right knee abscess."    Procedure(s) (LRB):  ECHOCARDIOGRAM,TRANSESOPHAGEAL (N/A)      Goals of Care Treatment Preferences:  Code Status: Full Code      SDOH Screening:  The patient was screened for food insecurity, housing instability, transportation needs, utility difficulties, and interpersonal safety. The social determinant(s) of health identified as a concern this admission are:  Food insecurity  Utility difficulties    Will discuss with case management and/or community health workers.    Social Drivers of Health with Concerns     Food Insecurity: Food Insecurity Present (6/16/2025)   Utilities: At Risk (6/16/2025)        Consults:   Consults (From admission, onward)          Status Ordering Provider     Inpatient consult to PICC team (NIAS)  " "Once        Provider:  (Not yet assigned)    Acknowledged KJ MARSH     Pharmacy to dose Vancomycin consult  Once        Provider:  (Not yet assigned)   Placed in "And" Linked Group    Acknowledged DEANDRE JOSE     Inpatient consult to Cardiology  Once        Provider:  Deandre Jose MD    Completed KJ MARSH     Inpatient consult to Cardiology  Once        Provider:  Deandre Jose MD    Completed KJ MARSH     Inpatient consult to Midline team  Once        Provider:  (Not yet assigned)    Completed SAWYER, DAVIDAng     Inpatient consult to Infectious Diseases  Once        Provider:  Nancy Dalton MD    Completed DEANDRE JOSE     Inpatient consult to Orthopedic Surgery  Once        Provider:  Juan Alberto Ascencio MD    Acknowledged DEANDRE JOSE     Inpatient consult to Orthopedic Surgery  Once        Provider:  Juan Alberto Ascencio MD    Acknowledged DEANDRE JOSE     Inpatient consult to General Surgery  Once        Provider:  Kj Lopez Jr., MD    Completed SAWYER, DAVIDAng          Hospital Course By Problem:   Assessment & Plan  Staphylococcal arthritis of left knee  Sepsis  Methicillin resistant Staphylococcus aureus infection  Bacteremia due to methicillin resistant Staphylococcus aureus  Intravenous drug abuse, continuous  Opioid use disorder  Cellulitis  -Admitted to inpatient status  -Presented with fevers and left knee pain.  Noted history of IVDA  -Blood cultures growing MRSA -   -Source likely related there her IV drug use and septic left knee  -Orthopedic surgery consulted and input appreciated.  She was taken for irrigation and debridement / arthrotomy of her left knee on 6/15.  -Repeat blood cultures 6/16 remain negative  -TTE and CITLALY without evidence of valvular vegetations  -Counseled on importance of complete abstinence from illicit drugs.  UDS positive for cocaine, opiates and THC.  Patient wishes to pursue outpatient treatment upon discharge.  -ID " consulted and input appreciated.  Discussed care with Dr. Dalton today.  Patient has been cleared by ID to go home with picc line for IV antibiotics.  She was seen and examined today.  She remains afebrile since 6/14 at 8:23 AM and is feeling well  -Will complete treatment with IV vancomycin via picc line.  End of course is 7/27/25.  Recommend follow-up with pcp within 1 week, orthopedic surgery within 2 weeks and ID within 1 month.  Recommend she enroll in outpatient substance rehab program.  Diarrhea  -Reported on admit  -Noted 1 episode overnight 6/16-6/17  -Started probiotic and prn imodium.  Resolved  Anemia  -Hb 9.7 on admit and now 9.7  -No evidence of bleeding  Obesity  Body mass index is 35.61 kg/m². Morbid obesity complicates all aspects of disease management from diagnostic modalities to treatment. Weight loss encouraged and health benefits explained to patient.  Final Active Diagnoses:    Diagnosis Date Noted POA    PRINCIPAL PROBLEM:  Staphylococcal arthritis of left knee [M00.062] 06/14/2025 Yes    Anemia [D64.9] 06/16/2025 Yes    Obesity [E66.9] 06/16/2025 Yes    Sepsis [A41.9] 06/15/2025 Yes    Methicillin resistant Staphylococcus aureus infection [A49.02] 06/15/2025 Yes    Bacteremia due to methicillin resistant Staphylococcus aureus [R78.81, B95.62] 06/15/2025 Yes    Intravenous drug abuse, continuous [F19.10] 06/15/2025 Yes    Opioid use disorder [F11.90] 06/15/2025 Yes    Diarrhea [R19.7] 06/14/2025 Yes      Problems Resolved During this Admission:    Diagnosis Date Noted Date Resolved POA    Abscess of right lower extremity [L02.415] 06/14/2025 06/15/2025 Yes       Discharged Condition: stable    Disposition: Home or Self Care    Follow Up:   Contact information for follow-up providers       Juan Alberto Ascnecio MD Follow up in 2 week(s).    Specialty: Orthopedic Surgery  Contact information:  [address] [facility] [address]  [phone]                       Contact information for  after-discharge care       Dialysis/Infusion       [facility] .    Service: Home Infusion and Injection  Contact information:  [address] [facility]  [address]  [facility] [address]  [phone]                                 Patient Instructions:      Ambulatory referral/consult to Infectious Disease   Standing Status: Future   Referral Priority: Routine Referral Type: Consultation   Referral Reason: Specialty Services Required   Referred to Provider: JATIN CRANE Requested Specialty: Infectious Diseases   Number of Visits Requested: 1     Ambulatory referral/consult to Orthopedics   Standing Status: Future   Referral Priority: Routine Referral Type: Consultation   Referred to Provider: RAGHAVENDRA SANTOS Requested Specialty: Orthopedic Surgery   Number of Visits Requested: 1     Ambulatory Referral/Consult to Occupational Therapy   Referral Priority: Routine Referral Type: Occupational Therapy   Referral Reason: Specialty Services Required   Requested Specialty: Occupational Therapy   Number of Visits Requested: 1     Ambulatory Referral/Consult to Physical Therapy   Referral Priority: Routine Referral Type: Physical Therapy   Referral Reason: Specialty Services Required   Number of Visits Requested: 1     Diet Adult Regular     Notify your health care provider if you experience any of the following:  increased confusion or weakness     Notify your health care provider if you experience any of the following:  persistent dizziness, light-headedness, or visual disturbances     Notify your health care provider if you experience any of the following:  worsening rash     Notify your health care provider if you experience any of the following:  severe persistent headache     Notify your health care provider if you experience any of the following:  difficulty breathing or increased cough     Notify your health care provider if you experience any of the following:  redness, tenderness, or signs of infection  "(pain, swelling, redness, odor or green/yellow discharge around incision site)     Notify your health care provider if you experience any of the following:  severe uncontrolled pain     Notify your health care provider if you experience any of the following:  persistent nausea and vomiting or diarrhea     Notify your health care provider if you experience any of the following:  temperature >100.4     Activity as tolerated       Significant Diagnostic Studies: Labs: BMP:   Recent Labs   Lab 06/18/25  0608   GLU 89   *   K 4.9      CO2 17*   BUN 15   CREATININE 0.9   CALCIUM 9.0   MG 1.8   , CMP   Recent Labs   Lab 06/18/25  0608   *   K 4.9      CO2 17*   GLU 89   BUN 15   CREATININE 0.9   CALCIUM 9.0   ANIONGAP 13   , CBC   Recent Labs   Lab 06/18/25  0608   WBC 9.01   HGB 9.7*   HCT 30.8*   , INR   Lab Results   Component Value Date    INR 0.9 04/27/2020    INR 0.9 09/11/2015   , Lipid Panel No results found for: "CHOL", "HDL", "LDLCALC", "TRIG", "CHOLHDL", Troponin No results for input(s): "TROPONINI" in the last 168 hours., and A1C: No results for input(s): "HGBA1C" in the last 4320 hours.    Pending Diagnostic Studies:       Procedure Component Value Units Date/Time    Basic Metabolic Panel [[id number]]     Order Status: Sent Lab Status: No result     Specimen: Blood     CBC Auto Differential [[id number]]     Order Status: Sent Lab Status: No result     Specimen: Blood     Narrative:      The following orders were created for panel order CBC Auto Differential.  Procedure                               Abnormality         Status                     ---------                               -----------         ------                     CBC with Differential[[id number]]                                                        Please view results for these tests on the individual orders.    CBC with Differential [[id number]]     Order Status: Sent Lab Status: No result     Specimen: Blood     CT " Leg (Tibia-Fibula) with Contrast Bilateral [[id number]] Resulted: 06/13/25 2241    Order Status: Sent Lab Status: In process Updated: 06/13/25 2312    Magnesium [[id number]]     Order Status: Sent Lab Status: No result     Specimen: Blood     VANCOMYCIN, TROUGH [[id number]]     Order Status: Sent Lab Status: No result     Specimen: Blood            Medications:  Reconciled Home Medications:      Medication List        START taking these medications      acetaminophen 500 MG tablet  Commonly known as: TYLENOL  Take 2 tablets (1,000 mg total) by mouth every 8 (eight) hours as needed for Pain.     lactobacillus acidophilus & bulgar 100 million cell packet  Commonly known as: LACTINEX  Take 1 packet (1 each total) by mouth 2 (two) times daily.     LIDOcaine 5 %  Commonly known as: LIDODERM  Place 1 patch onto the skin once daily. Remove & Discard patch within 12 hours or as directed by MD  Start taking on: June 20, 2025     oxyCODONE 10 mg Tab immediate release tablet  Commonly known as: ROXICODONE  Take 1 tablet (10 mg total) by mouth every 8 (eight) hours as needed (severe pain).     VANCOMYCIN 1 G/250 ML D5W (READY TO MIX)  Inject 250 mLs (1,000 mg total) into the vein every 12 (twelve) hours.            CONTINUE taking these medications      famotidine 20 MG tablet  Commonly known as: PEPCID  Take 20 mg by mouth 2 (two) times daily.     gabapentin 800 MG tablet  Commonly known as: NEURONTIN  Take 800 mg by mouth 3 (three) times daily.     ibuprofen 600 MG tablet  Commonly known as: ADVIL,MOTRIN  Take 1 tablet (600 mg total) by mouth every 6 (six) hours as needed for Pain.            STOP taking these medications      drospirenone-ethinyl estradioL 3-0.02 mg per tablet  Commonly known as: FRANCISCO     HYDROcodone-acetaminophen  mg per tablet  Commonly known as: NORCO     LIDOcaine-prilocaine cream  Commonly known as: EMLA     METHADONE ORAL     metoclopramide HCl 10 MG tablet  Commonly known as: REGLAN      valACYclovir 1000 MG tablet  Commonly known as: VALTREX              Indwelling Lines/Drains at time of discharge:   Lines/Drains/Airways       None                   Time spent on the discharge of patient: 35 minutes         North Iyer MD  Department of Hospital Medicine  [facility] Surg ([address])

## 2025-06-19 NOTE — PLAN OF CARE
Met with patient to review discharge recommendation of home IV infusion and is agreeable to plan    Patient/family provided list of facilities in-network with patient's payor plan. Providers that are owned, operated, or affiliated with [facility] are included on the list.     Notified that referral sent to below listed facilities from in-network list based on proximity to home/family support:   [facility]  2.[facility]  3. [facility]  4. [facility]    Patient has declined to select a preferred provider and elects placement with the first accepting in network provider that is available to provide services as ordered by the physician       If an additional preferred facility not listed above is identified, additional referral to be sent. If above facilities unable to accept, will send additional referrals to in-network providers.        1300  Patient has been declined from the following due to IVDU    1.[facility]  2.[facility]  3.[facility]

## 2025-06-19 NOTE — SUBJECTIVE & OBJECTIVE
Interval History:  No acute events overnight.  Very eager to go home.  Attempted discharge today with picc and home iv antibiotics, which myself and ID were comfortable with.  Unfortuantely due to her history of IVDA no infusion company would accept her as a patient for for home antibiotics with a picc line.  I have not witnessed any drug seeking behavior during her hospitalization.  Reports desire to do outpatient substance treatment once discharged.  Her father and son are at bedside.  All questions answered and patient had no further complaints.    Objective:     Vital Signs (Most Recent):  Temp: 98.9 °F (37.2 °C) (06/19/25 1226)  Pulse: 100 (06/19/25 1226)  Resp: 18 (06/19/25 1226)  BP: (!) 167/95 (06/19/25 1226)  SpO2: 100 % (06/19/25 1226) Vital Signs (24h Range):  Temp:  [98 °F (36.7 °C)-98.9 °F (37.2 °C)] 98.9 °F (37.2 °C)  Pulse:  [] 100  Resp:  [16-18] 18  SpO2:  [96 %-100 %] 100 %  BP: ()/(53-95) 167/95     Weight: 97.1 kg (214 lb)  Body mass index is 35.61 kg/m².    Intake/Output Summary (Last 24 hours) at 6/19/2025 1338  Last data filed at 6/19/2025 1252  Gross per 24 hour   Intake 480 ml   Output --   Net 480 ml         Physical Exam  Constitutional:       General: She is not in acute distress.     Appearance: She is obese. She is not ill-appearing or toxic-appearing.   HENT:      Head: Atraumatic.      Mouth/Throat:      Mouth: Mucous membranes are moist.   Eyes:      Extraocular Movements: Extraocular movements intact.   Cardiovascular:      Rate and Rhythm: Regular rhythm. Tachycardia present.      Heart sounds: Normal heart sounds. No murmur heard.  Pulmonary:      Effort: Pulmonary effort is normal.      Breath sounds: Normal breath sounds. No wheezing.   Abdominal:      General: Bowel sounds are normal. There is no distension.      Palpations: Abdomen is soft.      Tenderness: There is no abdominal tenderness.   Musculoskeletal:         General: Swelling and tenderness present. No  deformity. Normal range of motion.      Cervical back: Normal range of motion.      Comments: Left knee in post-surgical dressing   Skin:     Comments: Multiple small areas of induration on her skin   Neurological:      Mental Status: She is alert and oriented to person, place, and time. Mental status is at baseline.   Psychiatric:         Mood and Affect: Mood normal.         Behavior: Behavior normal.               Significant Labs: All pertinent labs within the past 24 hours have been reviewed.    Significant Imaging: I have reviewed all pertinent imaging results/findings within the past 24 hours.

## 2025-06-19 NOTE — DISCHARGE INSTRUCTIONS
Take all medications as prescribed.  Eat a regular diet.  Do not use any non-prescribed drugs such as cocaine, heroin or fentanyl.  Go to outpatient rehab for your substance problems.  Follow up with your physicians as scheduled - pcp within 1 week.  Orthopedic surgery (Dr. Juan Alberto Ascencio) within 2 weeks.  Infectious disease within 1 month.  Thank you for trusting [facility] and Dr. Iyer with your care.  We are honored that you entrusted us with your healthcare needs. Your satisfaction is very important to us and we hope you have been very pleased with your experience at [facility]. After your discharge you may receive a survey asking you to rate your hospital experience. We encourage you to take the time to complete the survey as your feedback allows us to identify areas for improvement as well as recognize our staff.   We hope that you have received the very best care possible during your hospitalization at [facility], as your satisfaction is our top priority.

## 2025-06-19 NOTE — PLAN OF CARE
Problem: Occupational Therapy  Goal: Occupational Therapy Goal  Description: Goals to be met by: 6/25/2025     Patient will increase functional independence with ADLs by performing:    UE Dressing with Modified [place].  MET 6/19/2025   LE Dressing with Modified [place].   MET 6/19/2025   Grooming for 3 tasks while standing at sink with Modified [place].  MET 6/19/2025   Toileting from toilet with Modified [place] for hygiene and clothing management.   MET 6/19/2025   Bathing from  shower chair/bench with Modified [place].  Not addressed.  Toilet transfer to toilet with Modified [place]. MET 6/19/2025     Outcome: Adequate for Care Transition

## 2025-06-19 NOTE — ASSESSMENT & PLAN NOTE
-Admitted to inpatient status  -Presented with fevers and left knee pain.  Noted history of IVDA  -Blood cultures growing MRSA -   -Source likely related there her IV drug use and septic left knee  -Orthopedic surgery consulted and input appreciated.  She was taken for irrigation and debridement / arthrotomy of her left knee on 6/15.  -Repeat blood cultures 6/16 remain negative  -TTE and CITLALY without evidence of valvular vegetations  -Counseled on importance of complete abstinence from illicit drugs.  UDS positive for cocaine, opiates and THC.  Patient wishes to pursue outpatient treatment upon discharge.  -ID consulted and input appreciated.  Discussed care with Dr. Dalton today.  Patient has been cleared by ID to go home with picc line for IV antibiotics.  She was seen and examined today.  She remains afebrile since 6/14 at 8:23 AM and is feeling well  -Will complete treatment with IV vancomycin via picc line.  End of course is 7/27/25.  Recommend follow-up with pcp within 1 week, orthopedic surgery within 2 weeks and ID within 1 month.  Recommend she enroll in outpatient substance rehab program.   dasi score 10

## 2025-06-19 NOTE — PROGRESS NOTES
Discussed negative findings on CITLALY with patient.    No need for me to follow.    Thanks,    Deandre Reynolds M.D.

## 2025-06-19 NOTE — PT/OT/SLP PROGRESS
Occupational Therapy   Treatment and Discharge     Name: Leslie aGnn  MRN: [id number]  Admitting Diagnosis:  Staphylococcal arthritis of left knee  1 Day Post-Op    Recommendations:     Discharge Recommendations: No Therapy Indicated  Discharge Equipment Recommendations:   (Defer to PT)  Barriers to discharge:  None    Assessment:     Leslie Gann is a 45 y.o. female with a medical diagnosis of Staphylococcal arthritis of left knee.  She presents with the following performance deficits affecting function: edema, impaired skin, decreased ROM, decreased lower extremity function, weakness.   Pt is no longer needing acute care OT services.  Pt is grossly Mod I/Indep with self care tasks.  Pt in agreement with discharge this date with goals well met.      Plan:      (Discharge from OT services today.)    Plan of Care Expires: 06/19/25  Plan of Care Reviewed with: patient, father    Subjective     Chief Complaint:  Wanting to go home today.   Patient/Family Comments/goals:  Pt stating she is not sure how she will get to outpatient infusion therapy clinic.    Pain/Comfort:  Pain Rating 1: 0/10    Objective:     Communicated with: Patient found HOB elevated with telemetry (Midline) upon OT entry to room.  Pt's son and pt's father in room.    General Precautions: Standard, contact, fall, seizure    Orthopedic Precautions:N/A  Braces: N/A  Respiratory Status: Room air;     Occupational Performance:     Bed Mobility:    Indep    Functional Mobility/Transfers:  Sit to stand: Indep  Transfers: Indep  Functional Mobility: Mod I      Activities of Daily Living:  Dressing: Mod I - socks and shoes  Toileting: Indep  Grooming: Indep      [facility] 6 Click ADL: 23    Treatment & Education:  Education Documentation  Treatment Plan, taught by Marilu Walls LOTR at 6/19/2025  2:48 PM.  Learner: Father, Patient  Readiness: Acceptance  Method: Explanation  Response: Verbalizes Understanding  Comment: Role of OT, POC, ADL and ADL  mobilty, no further acute care OT needs, discharge from OT services this date with pt in agreement.      Patient left sitting edge of bed with all lines intact, call button in reach, nurse notified, and pt's son and father present.       GOALS:   Multidisciplinary Problems       Occupational Therapy Goals          Problem: Occupational Therapy    Goal Priority Disciplines Outcome Interventions   Occupational Therapy Goal     OT, PT/OT Adequate for Care Transition    Description: Goals to be met by: 6/25/2025     Patient will increase functional independence with ADLs by performing:    UE Dressing with Modified [place].  MET 6/19/2025   LE Dressing with Modified [place].   MET 6/19/2025   Grooming for 3 tasks while standing at sink with Modified [place].  MET 6/19/2025   Toileting from toilet with Modified [place] for hygiene and clothing management.   MET 6/19/2025   Bathing from  shower chair/bench with Modified [place].  Not addressed.  Toilet transfer to toilet with Modified [place]. MET 6/19/2025                            DME Justifications:  No DME recommended requiring DME justifications    Time Tracking:     OT Date of Treatment: 06/19/25  OT Start Time: 1303  OT Stop Time: 1340  OT Total Time (min): 37 min    Billable Minutes:Self Care/Home Management 25    OT/LORETTA: OT            6/19/2025

## 2025-06-19 NOTE — ASSESSMENT & PLAN NOTE
-Admitted to inpatient status  -Presented with fevers and left knee pain.  Noted history of IVDA  -Blood cultures growing MRSA -   -Source likely related there her IV drug use and septic left knee  -Orthopedic surgery consulted and input appreciated.  She was taken for irrigation and debridement / arthrotomy of her left knee on 6/15.  -Repeat blood cultures 6/16 remain negative  -TTE and CITLALY without evidence of valvular vegetations  -Counseled on importance of complete abstinence from illicit drugs.  UDS positive for cocaine, opiates and THC.  Patient wishes to pursue outpatient treatment upon discharge.  -ID consulted and input appreciated.  Discussed care with Dr. Dalton today.  Patient has been cleared by ID to go home with picc line for IV antibiotics.  She was seen and examined today.  She remains afebrile since 6/14 at 8:23 AM and is feeling well  -Will complete treatment with IV vancomycin via picc line.  End of course is 7/27/25.  Recommend follow-up with pcp within 1 week, orthopedic surgery within 2 weeks and ID within 1 month.  Recommend she enroll in outpatient substance rehab program.

## 2025-06-19 NOTE — PT/OT/SLP PROGRESS
Physical Therapy Treatment    Patient Name:  Leslie Gann   MRN:  [id number]    Recommendations:     Discharge Recommendations: Low Intensity Therapy  Discharge Equipment Recommendations: cane, straight  Barriers to discharge: None    Assessment:     Leslie Gann is a 45 y.o. female admitted with a medical diagnosis of Staphylococcal arthritis of left knee.  She presents with the following impairments/functional limitations: weakness, gait instability, pain, edema, impaired balance, impaired functional mobility, decreased lower extremity function, decreased ROM.    Supine>sit with mod(I)  Sit>stand with no AD and mod(I)  Amb 10' with no AD and mod(I) + 20' with SPC and mod(I)  Pt reporting less stress on LLE when using cane and increased confidence  Rec Low Intensity Therapy (OP PT when cleared by MD)    Rehab Prognosis: Good; patient would benefit from acute skilled PT services to address these deficits and reach maximum level of function.    Recent Surgery: Procedure(s) (LRB):  ECHOCARDIOGRAM,TRANSESOPHAGEAL (N/A) 1 Day Post-Op    Plan:     During this hospitalization, patient to be seen 3 x/week to address the identified rehab impairments via gait training, therapeutic activities, therapeutic exercises, neuromuscular re-education and progress toward the following goals:    Plan of Care Expires:  07/03/25    Subjective     Chief Complaint: those exercises aggravate the pain, and I'm not trying to do that right now  Patient/Family Comments/goals: This cane does help! Can I keep this one?  Pain/Comfort:  Pain Rating 1: other (see comments) (pt not rating pain)  Location - Side 1: Left  Location 1: knee  Pain Addressed 1: Pre-medicate for activity, Reposition, Distraction, Cessation of Activity  Pain Rating Post-Intervention 1: other (see comments) (not rating pain)      Objective:     Communicated with nurse Foreman prior to session.  Patient found HOB elevated with peripheral IV, telemetry upon PT  entry to room.     General Precautions: Standard, fall, contact  Orthopedic Precautions: N/A  Braces: N/A  Respiratory Status: Room air     Functional Mobility:  Bed Mobility:     Supine to Sit: modified independence  Transfers:     Sit to Stand:  modified independence with no AD  Gait: 10' with no AD and mod(I) + 20' with SPC and mod(I)      AM-PAC 6 CLICK MOBILITY  Turning over in bed (including adjusting bedclothes, sheets and blankets)?: 4  Sitting down on and standing up from a chair with arms (e.g., wheelchair, bedside commode, etc.): 4  Moving from lying on back to sitting on the side of the bed?: 4  Moving to and from a bed to a chair (including a wheelchair)?: 4  Need to walk in hospital room?: 4  Climbing 3-5 steps with a railing?: 3  Basic Mobility Total Score: 23       Treatment & Education:  Gait training with/without cane as noted    Education Documentation  Treatment Plan, taught by Cortez Van PTA at 6/19/2025  9:41 AM.  Learner: Patient  Readiness: Acceptance  Method: Explanation, Demonstration  Response: Verbalizes Understanding, Demonstrated Understanding  Comment: Use of straight cane; HEP    Education Comments  No comments found.          Patient left sitting edge of bed with all lines intact, call button in reach, and nurse Ahsan pt's son present..    GOALS:   Multidisciplinary Problems       Physical Therapy Goals          Problem: Physical Therapy    Goal Priority Disciplines Outcome Interventions   Physical Therapy Goal     PT, PT/OT Progressing    Description: P.T goals to be met in 2 weeks as follows:  1. Gait 300' Mod I with/without AD  2. I with L knee ROM HEP  3. Pt will ascend/descend 3 steps Mod I                         DME Justifications:  No DME recommended requiring DME justifications    Time Tracking:     PT Received On: 06/19/25  PT Start Time: 0837     PT Stop Time: 0848  PT Total Time (min): 11 min     Billable Minutes: Gait Training 11    Treatment Type:  Treatment  PT/PTA: PTA     Number of PTA visits since last PT visit: 1 06/19/2025

## 2025-06-19 NOTE — PROGRESS NOTES
Pharmacokinetic Assessment Follow Up: IV Vancomycin    Vancomycin serum concentration assessment(s):    The trough level was drawn ~2 hours early but can be used to guide therapy at this time. The measurement is within the desired definitive target range of 15 to 20 mcg/mL.    Vancomycin Regimen Plan:    Continue regimen to Vancomycin 1000 mg IV every 12 hours with next serum trough concentration measured at 0330 prior to 0430 dose on 6/21/25    Drug levels (last 3 results):  Recent Labs   Lab Result Units 06/17/25  1250 06/18/25  0053 06/19/25  1302   Vancomycin Random ug/ml  --  14.8  --    Vancomycin Trough ug/ml 27.4*  --  17.5       Pharmacy will continue to follow and monitor vancomycin.    Please contact pharmacy at extension [phone] for questions regarding this assessment.    Thank you for the consult,   Antoinette Spivey       Patient brief summary:  Leslie Gann is a 45 y.o. female initiated on antimicrobial therapy with IV Vancomycin for treatment of bone/joint infection    The patient's current regimen is vancomycin IV 1000 mg q12h    Drug Allergies:   Review of patient's allergies indicates:  No Known Allergies    Actual Body Weight:   97.1 kg    Renal Function:   Estimated Creatinine Clearance: 81 mL/min (based on SCr of 1 mg/dL).,     Dialysis Method (if applicable):  N/A    CBC (last 72 hours):  Recent Labs   Lab Result Units 06/17/25  0454 06/18/25  0608 06/19/25  1302   WBC K/uL 9.98 9.01 8.00   HGB gm/dL 11.2* 9.7* 9.7*   HCT % 35.6* 30.8* 31.2*   Platelet Count K/uL 461*  --  506*   Lymph % % 46.5 44.4 49.4*   Mono % % 6.8 8.0 9.9   Eos % % 1.2 1.7 3.4   Basophil % % 0.5 0.4 0.5       Metabolic Panel (last 72 hours):  Recent Labs   Lab Result Units 06/17/25  0454 06/18/25  0608 06/19/25  1302   Sodium mmol/L 140 135* 141   Potassium mmol/L 4.1 4.9 4.8   Chloride mmol/L 108 105 103   CO2 mmol/L 19* 17* 23   Glucose mg/dL 102 89 119*   BUN mg/dL 19 15 10   Creatinine mg/dL 1.2 0.9 1.0    Magnesium  mg/dL 2.2 1.8 2.1       Vancomycin Administrations:  vancomycin given in the last 96 hours                     vancomycin (VANCOCIN) 1,000 mg in 0.9% NaCl 250 mL IVPB (admixture device) (mg) 1,000 mg New Bag 06/19/25 0447     1,000 mg New Bag 06/18/25 1559     1,000 mg New Bag  0436    vancomycin 1,500 mg in 0.9% NaCl 250 mL IVPB (admixture device) (mg) 1,500 mg New Bag 06/17/25 0142     1,500 mg New Bag 06/16/25 1424     1,500 mg New Bag  0109                    Microbiologic Results:  Microbiology Results (last 7 days)       Procedure Component Value Units Date/Time    Blood culture [[id number]]  (Normal) Collected: 06/16/25 0726    Order Status: Completed Specimen: Blood Updated: 06/19/25 1301     Blood Culture No Growth After 72 Hours    Culture, Body Fluid (Aerobic) w/ GS [[id number]] Collected: 06/15/25 1555    Order Status: Completed Specimen: Body Fluid from Knee, Left Updated: 06/19/25 1203     CULTURE, FLUID No Growth     GRAM STAIN No WBCs      No organisms seen    Blood culture [[id number]]  (Normal) Collected: 06/16/25 0726    Order Status: Completed Specimen: Blood Updated: 06/19/25 1202     Blood Culture No Growth After 72 Hours    Aerobic culture [[id number]]  (Abnormal) Collected: 06/15/25 0822    Order Status: Completed Specimen: Aspirate from Knee, Left Updated: 06/18/25 1255     CULTURE, AEROBIC Rare Staphylococcus aureus     Comment: Susceptibility pending       Culture, Anaerobe [[id number]] Collected: 06/15/25 0822    Order Status: Completed Specimen: Body Fluid from Knee, Left Updated: 06/18/25 0747     Anaerobe Culture Culture In Progress    Blood culture x two cultures. Draw prior to antibiotics. [[id number]]  (Abnormal) Collected: 06/13/25 1748    Order Status: Completed Specimen: Blood from Peripheral, Upper Arm, Left Updated: 06/17/25 1110     Blood Culture Methicillin resistant Staphylococcus aureus     GRAM STAIN Gram positive cocci in clusters resembling Staph      Comment: Aerobic Bottle Positive        Narrative:        For susceptibility see accession number  [id number]      Blood culture x two cultures. Draw prior to antibiotics. [[id number]]  (Abnormal)  (Susceptibility) Collected: 06/13/25 1834    Order Status: Completed Specimen: Blood from Peripheral, Upper Arm, Right Updated: 06/17/25 1106     Blood Culture Methicillin resistant Staphylococcus aureus     GRAM STAIN Gram positive cocci in clusters resembling Staph     Comment: Aerobic Bottle Positive       Narrative:         ID Consult Strongly Recommended    Aerobic culture [[id number]] Collected: 06/15/25 1555    Order Status: Sent Specimen: Body Fluid from Knee, Left     Culture, Body Fluid (Aerobic) w/ GS [[id number]]     Order Status: Sent Specimen: Body Fluid from Knee, Left     MRSA/SA Rapid ID by PCR from Blood culture [[id number]]  (Abnormal) Collected: 06/13/25 1834    Order Status: Completed Specimen: Blood from Peripheral, Upper Arm, Right Updated: 06/15/25 0513     Staph aureus ID by PCR Positive     MRSA ID by PCR Positive    Stool culture [[id number]]     Order Status: Canceled Specimen: Stool     Clostridium difficile EIA [[id number]]     Order Status: Canceled Specimen: Stool

## 2025-06-19 NOTE — PLAN OF CARE
Patient has been denied from the following home health services     For Social Issues  [facility]    For Insurance   [facility]

## 2025-06-19 NOTE — PLAN OF CARE
CM is working with the patient's care team to come up with an alternate discharge plan due to amount of denials from home infusion and home health

## 2025-06-19 NOTE — ASSESSMENT & PLAN NOTE
-Admitted to inpatient status  -Presented with fevers and left knee pain.  Noted history of IVDA  -Blood cultures growing MRSA -   -Source likely related there her IV drug use and septic left knee  -Orthopedic surgery consulted and input appreciated.  She was taken for irrigation and debridement / arthrotomy of her left knee on 6/15.  -Repeat blood cultures 6/16 remain negative  -TTE and CITLALY without evidence of valvular vegetations  -Counseled on importance of complete abstinence from illicit drugs.  UDS positive for cocaine, opiates and THC.  Patient wishes to pursue outpatient treatment upon discharge.  -ID consulted and input appreciated.  Discussed care with Dr. Datlon today.  Patient has been cleared by ID to go home with picc line for IV antibiotics.  She was seen and examined today.  She remains afebrile since 6/14 at 8:23 AM and is feeling well  -Will complete treatment with IV vancomycin via picc line.  End of course is 7/27/25.  Recommend follow-up with pcp within 1 week, orthopedic surgery within 2 weeks and ID within 1 month.  Recommend she enroll in outpatient substance rehab program.

## 2025-06-19 NOTE — PROGRESS NOTES
"[facility] Surg [address] Medicine  Progress Note    Patient Name: Leslie Gann  MRN: [id number]  Patient Class: IP- Inpatient   Admission Date: 6/13/2025  Length of Stay: 5 days  Attending Physician: North Iyer MD  Primary Care Provider: Cailin Shirley FNP-C        Subjective     Principal Problem:Staphylococcal arthritis of left knee        HPI:  Per Rakesh Morrell, NP:    "The patient is a 45 y.o. female with medical history of IV drug abuse (last injected 1 hour prior to coming to the ED), recent shingles infection, and leg wounds who presents with bilateral leg pain with associated redness and erythema.  Patient states she does "inject dope and cocaine and has multiple abscesses due to injection sites.  Patient states increased pain in her right knee with difficulty ambulating.  Patient endorsing fever and chills.  No nausea or vomiting.  CT positive for small abscess to right femoral medial condyle and various areas of subq air likely due to injection sites.  She will be admitted for further management of her right knee abscess."    Overview/Hospital Course:  Patient is a 45 year woman with and intravenous drug use presented with diffuse body pain involving multiple areas of redness erythema along with left knee pain.    Patient with evidence of sepsis secondary to skin soft tissue infection and also now concern for left knee infection as left knee more painful and swollen.  Blood cultures positive for Staphylococcus aureus (PCR testing consistent with MRSA).    NPO.  Adjusted pain medication for better pain control.  Orthopedic surgery consulted for evaluation aspiration and joint/washout.    Interval History:  No acute events overnight.  Very eager to go home.  Attempted discharge today with picc and home iv antibiotics, which myself and ID were comfortable with.  Unfortuantely due to her history of IVDA no infusion company would accept her as a patient for for home " antibiotics with a picc line.  I have not witnessed any drug seeking behavior during her hospitalization.  Reports desire to do outpatient substance treatment once discharged.  Her father and son are at bedside.  All questions answered and patient had no further complaints.    Objective:     Vital Signs (Most Recent):  Temp: 98.9 °F (37.2 °C) (06/19/25 1226)  Pulse: 100 (06/19/25 1226)  Resp: 18 (06/19/25 1226)  BP: (!) 167/95 (06/19/25 1226)  SpO2: 100 % (06/19/25 1226) Vital Signs (24h Range):  Temp:  [98 °F (36.7 °C)-98.9 °F (37.2 °C)] 98.9 °F (37.2 °C)  Pulse:  [] 100  Resp:  [16-18] 18  SpO2:  [96 %-100 %] 100 %  BP: ()/(53-95) 167/95     Weight: 97.1 kg (214 lb)  Body mass index is 35.61 kg/m².    Intake/Output Summary (Last 24 hours) at 6/19/2025 1338  Last data filed at 6/19/2025 1252  Gross per 24 hour   Intake 480 ml   Output --   Net 480 ml         Physical Exam  Constitutional:       General: She is not in acute distress.     Appearance: She is obese. She is not ill-appearing or toxic-appearing.   HENT:      Head: Atraumatic.      Mouth/Throat:      Mouth: Mucous membranes are moist.   Eyes:      Extraocular Movements: Extraocular movements intact.   Cardiovascular:      Rate and Rhythm: Regular rhythm. Tachycardia present.      Heart sounds: Normal heart sounds. No murmur heard.  Pulmonary:      Effort: Pulmonary effort is normal.      Breath sounds: Normal breath sounds. No wheezing.   Abdominal:      General: Bowel sounds are normal. There is no distension.      Palpations: Abdomen is soft.      Tenderness: There is no abdominal tenderness.   Musculoskeletal:         General: Swelling and tenderness present. No deformity. Normal range of motion.      Cervical back: Normal range of motion.      Comments: Left knee in post-surgical dressing   Skin:     Comments: Multiple small areas of induration on her skin   Neurological:      Mental Status: She is alert and oriented to person, place, and  time. Mental status is at baseline.   Psychiatric:         Mood and Affect: Mood normal.         Behavior: Behavior normal.               Significant Labs: All pertinent labs within the past 24 hours have been reviewed.    Significant Imaging: I have reviewed all pertinent imaging results/findings within the past 24 hours.        Assessment & Plan  Staphylococcal arthritis of left knee  Sepsis  Methicillin resistant Staphylococcus aureus infection  Bacteremia due to methicillin resistant Staphylococcus aureus  Intravenous drug abuse, continuous  Opioid use disorder  Cellulitis  -Admitted to inpatient status  -Presented with fevers and left knee pain.  Noted history of IVDA  -Blood cultures growing MRSA -   -Source likely related there her IV drug use and septic left knee  -Orthopedic surgery consulted and input appreciated.  She was taken for irrigation and debridement / arthrotomy of her left knee on 6/15.  -Repeat blood cultures 6/16 remain negative  -TTE and CITLALY without evidence of valvular vegetations  -Counseled on importance of complete abstinence from illicit drugs.  UDS positive for cocaine, opiates and THC.  Patient wishes to pursue outpatient treatment upon discharge.  -ID consulted and input appreciated.  Discussed care with Dr. Dalton today.  Patient was been cleared by ID and myself to go home with picc line for IV vancomycin (EOC 7/27/25)  Unfortunately, no infusion company would agree to this plan given her history of IVDA  -Patient very strongly wishes to go home and we are now working to see if we can arrange for to receive treatment with dalbavancin at the infusion center.  Pharmacy contacted and are submitting to insurance today for approval and do not expect a response until tomorrow.  If approved then she will be able to go home tomorrow.  -Cancel order for PICC line.  Continue vancomycin for now.  -Recommend follow-up with pcp within 1 week, orthopedic surgery within 2 weeks and ID within 1  month.  Recommend she enroll in outpatient substance rehab program at discharge, which she intends to do.  Diarrhea  -Reported on admit  -Noted 1 episode overnight 6/16-6/17  -Started probiotic and prn imodium.  Resolved  Anemia  -Hb 9.7 on admit and now 9.7  -No evidence of bleeding  Obesity  Body mass index is 35.61 kg/m². Morbid obesity complicates all aspects of disease management from diagnostic modalities to treatment. Weight loss encouraged and health benefits explained to patient.  VTE Risk Mitigation (From admission, onward)           Ordered     IP VTE HIGH RISK PATIENT  Once         06/14/25 0224     Place sequential compression device  Until discontinued         06/14/25 0224     Reason for No Pharmacological VTE Prophylaxis  Once        Question:  Reasons:  Answer:  Risk of Bleeding    06/14/25 0224                    Discharge Planning   LIVIER: 6/19/2025     Code Status: Full Code   Medical Readiness for Discharge Date: 6/19/2025  Discharge Plan A: Home with family (pending PT/OT consult)                Please place Justification for DME        North Iyer MD  Department of Hospital Medicine   [facility] - Med Surg ([address])

## 2025-06-19 NOTE — ASSESSMENT & PLAN NOTE
-Admitted to inpatient status  -Presented with fevers and left knee pain.  Noted history of IVDA  -Blood cultures growing MRSA -   -Source likely related there her IV drug use and septic left knee  -Orthopedic surgery consulted and input appreciated.  She was taken for irrigation and debridement / arthrotomy of her left knee on 6/15.  -Repeat blood cultures 6/16 remain negative  -TTE and CITLALY without evidence of valvular vegetations  -Counseled on importance of complete abstinence from illicit drugs.  UDS positive for cocaine, opiates and THC.  Patient wishes to pursue outpatient treatment upon discharge.  -ID consulted and input appreciated.  Discussed care with Dr. Dalton today.  Patient was been cleared by ID and myself to go home with picc line for IV vancomycin (EOC 7/27/25)  Unfortunately, no infusion company would agree to this plan given her history of IVDA  -Patient very strongly wishes to go home and we are now working to see if we can arrange for to receive treatment with dalbavancin at the infusion center.  Pharmacy contacted and are submitting to insurance today for approval and do not expect a response until tomorrow.  If approved then she will be able to go home tomorrow.  -Cancel order for PICC line.  Continue vancomycin for now.  -Recommend follow-up with pcp within 1 week, orthopedic surgery within 2 weeks and ID within 1 month.  Recommend she enroll in outpatient substance rehab program at discharge, which she intends to do.

## 2025-06-19 NOTE — PLAN OF CARE
Met with patient  to review discharge recommendation of home health  and is agreeable to plan    Patient/family provided list of facilities in-network with patient's payor plan. Providers that are owned, operated, or affiliated with [facility] are included on the list.     Notified that referral sent to below listed facilities from in-network list based on proximity to home/family support:   [facility]  [facility]  3. [facility]  4. [facility]      Patient/family instructed to identify preference.    Patient has declined to select a preferred provider and elects placement with the first accepting in network provider that is available to provide services as ordered by the physician       If an additional preferred facility not listed above is identified, additional referral to be sent. If above facilities unable to accept, will send additional referrals to in-network providers.         06/19/25 0842   Post-Acute Status   Post-Acute Authorization Home Health   Home Health Status Referrals Sent   Patient choice form signed by patient/caregiver List with quality metrics by geographic area provided;List from CMS Compare;List from System Post-Acute Care

## 2025-06-19 NOTE — PLAN OF CARE
CM spoke with [facility] who accpets the patient's insurance. Company is willing to so the home infusion with lylevalnce.     CM will continue to follow

## 2025-06-19 NOTE — PLAN OF CARE
[facility] ([address])      HOME HEALTH ORDERS  FACE TO FACE ENCOUNTER    Patient Name: Leslie Gann  YOB: 1980    PCP: Cailin Shirley FNP-C   PCP Address: [address] [facility] [address]  PCP Phone Number: [phone]  PCP Fax: [phone]    Encounter Date: 6/13/25    Admit to Home Health    Diagnoses:  Active Hospital Problems    Diagnosis  POA    *Staphylococcal arthritis of left knee [M00.062]  Yes    Anemia [D64.9]  Yes    Obesity [E66.9]  Yes    Sepsis [A41.9]  Yes    Methicillin resistant Staphylococcus aureus infection [A49.02]  Yes    Bacteremia due to methicillin resistant Staphylococcus aureus [R78.81, B95.62]  Yes    Intravenous drug abuse, continuous [F19.10]  Yes    Opioid use disorder [F11.90]  Yes    Diarrhea [R19.7]  Yes      Resolved Hospital Problems    Diagnosis Date Resolved POA    Abscess of right lower extremity [L02.415] 06/15/2025 Yes       Follow Up Appointments:  No future appointments.    Allergies:Review of patient's allergies indicates:  No Known Allergies    Medications: Review discharge medications with patient and family and provide education.    Current Medications[1]     Medication List        START taking these medications      acetaminophen 500 MG tablet  Commonly known as: TYLENOL  Take 2 tablets (1,000 mg total) by mouth every 8 (eight) hours as needed for Pain.     lactobacillus acidophilus & bulgar 100 million cell packet  Commonly known as: LACTINEX  Take 1 packet (1 each total) by mouth 2 (two) times daily.     LIDOcaine 5 %  Commonly known as: LIDODERM  Place 1 patch onto the skin once daily. Remove & Discard patch within 12 hours or as directed by MD  Start taking on: June 20, 2025     oxyCODONE 10 mg Tab immediate release tablet  Commonly known as: ROXICODONE  Take 1 tablet (10 mg total) by mouth every 8 (eight) hours as needed (severe pain).     VANCOMYCIN 1 G/250 ML D5W (READY TO MIX)  Inject 250 mLs (1,000 mg total)  into the vein every 12 (twelve) hours.            CONTINUE taking these medications      famotidine 20 MG tablet  Commonly known as: PEPCID  Take 20 mg by mouth 2 (two) times daily.     gabapentin 800 MG tablet  Commonly known as: NEURONTIN  Take 800 mg by mouth 3 (three) times daily.     ibuprofen 600 MG tablet  Commonly known as: ADVIL,MOTRIN  Take 1 tablet (600 mg total) by mouth every 6 (six) hours as needed for Pain.            STOP taking these medications      drospirenone-ethinyl estradioL 3-0.02 mg per tablet  Commonly known as: FRANCISCO     HYDROcodone-acetaminophen  mg per tablet  Commonly known as: NORCO     LIDOcaine-prilocaine cream  Commonly known as: EMLA     METHADONE ORAL     metoclopramide HCl 10 MG tablet  Commonly known as: REGLAN     valACYclovir 1000 MG tablet  Commonly known as: VALTREX                I have seen and examined this patient within the last 30 days. My clinical findings that support the need for the home health skilled services and home bound status are the following:no   Medical restrictions requiring assistance of another human to leave home due to  IV infusion Needs.     Diet:   regular diet    Labs:  SN to check the following labs and fax to [facility] ID clinic [phone]:  -On Mondays - CBC, CMP CRP, Vancomycin trough (target 15-20)  -On Thursdays - CMP and Vancomycin trough (target 15-20)    Activities:   ambulate in house     Nursing:   Agency to admit patient within 24 hours of hospital discharge unless specified on physician order or at patient request    SN to complete comprehensive assessment including routine vital signs. Instruct on disease process and s/s of complications to report to MD. Review/verify medication list sent home with the patient at time of discharge  and instruct patient/caregiver as needed. Frequency may be adjusted depending on start of care date.     Skilled nurse to perform up to 3 visits PRN for symptoms related to diagnosis    Notify MD if SBP >  160 or < 90; DBP > 90 or < 50; HR > 120 or < 50; Temp > 101; O2 < 88%; Other:       Ok to schedule additional visits based on staff availability and patient request on consecutive days within the home health episode.    When multiple disciplines ordered:    Start of Care occurs on Sunday - Wednesday schedule remaining discipline evaluations as ordered on separate consecutive days following the start of care.    Thursday SOC -schedule subsequent evaluations Friday and Monday the following week.     Friday - Saturday SOC - schedule subsequent discipline evaluations on consecutive days starting Monday of the following week.    For all post-discharge communication and subsequent orders please contact patient's primary care physician.    Miscellaneous   Home Infusion Therapy:   SN to perform Infusion Therapy/Central Line Care.  Review Central Line Care & Central Line Flush with patient.    Administer (drug and dose): Vancomycin 1gram via picc line q12 hours  Last dose given: 6/19/2025                         Home dose due: 6/19/2025  Final dose due 7/27/2025    Scrub the Hub: Prior to accessing the line, always perform a 30 second alcohol scrub  Each lumen of the central line is to be flushed at least daily with 10 mL Normal Saline and 3 mL Heparin flush (10 units/mL)  Skilled Nurse (SN) may draw blood from IV access  Blood Draw Procedure:   - Aspirate at least 5 mL of blood   - Discard   - Obtain specimen   - Change injection cap   - Flush with 20 mL Normal Saline followed by a                 3-5 mL Heparin flush (10 units/mL)  Central :   - Sterile dressing changes are done weekly and as needed.   - Use chlor-hexadine scrub to cleanse site, apply Biopatch to insertion site,       apply securement device dressing   - Injection caps are changed weekly and after EVERY lab draw.   - If sterile gauze is under dressing to control oozing,                 dressing change must be performed every 24 hours until  gauze is not needed.   - Remove picc line after final dose of antibiotics completed    Home Health Aide:  Nursing Twice weekly    I certify that this patient is confined to her home and needs intermittent skilled nursing care.               [1]   Current Facility-Administered Medications   Medication Dose Route Frequency Provider Last Rate Last Admin    acetaminophen tablet 1,000 mg  1,000 mg Oral Q8H PRN Deandre Reynolds MD   1,000 mg at 06/18/25 2046    gabapentin capsule 600 mg  600 mg Oral TID [place] MD   600 mg at 06/19/25 0844    lactobacillus acidophilus & bulgar 100 million cell packet 1 each  1 packet Oral BID Deandre Reynolds MD   1 each at 06/18/25 2046    LIDOcaine 5 % patch 1 patch  1 patch Transdermal Daily Deandre Reynolds MD        loperamide capsule 2 mg  2 mg Oral QID PRN eDandre Reynolds MD        morphine injection 6 mg  6 mg Intravenous Q4H PRN Daendre Reynolds MD   6 mg at 06/19/25 0551    mupirocin 2 % ointment   Nasal BID Deandre Reynolds MD   Given at 06/17/25 2031    naloxone 0.4 mg/mL injection 0.02 mg  0.02 mg Intravenous PRN Deandre Reynolds MD        ondansetron injection 4 mg  4 mg Intravenous Q8H PRN Deandre Reynolds MD        oxyCODONE immediate release tablet Tab 10 mg  10 mg Oral Q4H PRN Deandre Reynolds MD   10 mg at 06/19/25 0121    simethicone chewable tablet 80 mg  1 tablet Oral TID PRN Deandre Reynolds MD   80 mg at 06/17/25 1813    sodium chloride 0.9% flush 10 mL  10 mL Intravenous Q12H PRN Deandre Reynolds MD        valACYclovir tablet 1,000 mg  1,000 mg Oral BID Deandre Reynolds MD   1,000 mg at 06/19/25 0844    vancomycin (VANCOCIN) 1,000 mg in 0.9% NaCl 250 mL IVPB (admixture device)  1,000 mg Intravenous Q12H Deandre Reynolds MD   Stopped at 06/19/25 0617    vancomycin - pharmacy to dose   Intravenous pharmacy to manage frequency Deandre Reynolds MD

## 2025-06-20 VITALS
RESPIRATION RATE: 18 BRPM | TEMPERATURE: 98 F | SYSTOLIC BLOOD PRESSURE: 169 MMHG | WEIGHT: 214 LBS | HEIGHT: 65 IN | BODY MASS INDEX: 35.65 KG/M2 | HEART RATE: 94 BPM | OXYGEN SATURATION: 98 % | DIASTOLIC BLOOD PRESSURE: 101 MMHG

## 2025-06-20 LAB
ABSOLUTE EOSINOPHIL (OHS): 0.29 K/UL
ABSOLUTE MONOCYTE (OHS): 0.78 K/UL (ref 0.3–1)
ABSOLUTE NEUTROPHIL COUNT (OHS): 2.49 K/UL (ref 1.8–7.7)
ANION GAP (OHS): 10 MMOL/L (ref 8–16)
BACTERIA SPEC ANAEROBE CULT: NORMAL
BASOPHILS # BLD AUTO: 0.05 K/UL
BASOPHILS NFR BLD AUTO: 0.7 %
BUN SERPL-MCNC: 12 MG/DL (ref 6–20)
CALCIUM SERPL-MCNC: 8.9 MG/DL (ref 8.7–10.5)
CHLORIDE SERPL-SCNC: 105 MMOL/L (ref 95–110)
CO2 SERPL-SCNC: 24 MMOL/L (ref 23–29)
CREAT SERPL-MCNC: 0.8 MG/DL (ref 0.5–1.4)
ERYTHROCYTE [DISTWIDTH] IN BLOOD BY AUTOMATED COUNT: 15.1 % (ref 11.5–14.5)
GFR SERPLBLD CREATININE-BSD FMLA CKD-EPI: >60 ML/MIN/1.73/M2
GLUCOSE SERPL-MCNC: 96 MG/DL (ref 70–110)
HCT VFR BLD AUTO: 28.8 % (ref 37–48.5)
HGB BLD-MCNC: 9.1 GM/DL (ref 12–16)
IMM GRANULOCYTES # BLD AUTO: 0.08 K/UL (ref 0–0.04)
IMM GRANULOCYTES NFR BLD AUTO: 1.1 % (ref 0–0.5)
LYMPHOCYTES # BLD AUTO: 3.6 K/UL (ref 1–4.8)
MAGNESIUM SERPL-MCNC: 2.1 MG/DL (ref 1.6–2.6)
MCH RBC QN AUTO: 26.5 PG (ref 27–31)
MCHC RBC AUTO-ENTMCNC: 31.6 G/DL (ref 32–36)
MCV RBC AUTO: 84 FL (ref 82–98)
NUCLEATED RBC (/100WBC) (OHS): 0 /100 WBC
PLATELET # BLD AUTO: 523 K/UL (ref 150–450)
PMV BLD AUTO: 8.5 FL (ref 9.2–12.9)
POTASSIUM SERPL-SCNC: 4.4 MMOL/L (ref 3.5–5.1)
RBC # BLD AUTO: 3.44 M/UL (ref 4–5.4)
RELATIVE EOSINOPHIL (OHS): 4 %
RELATIVE LYMPHOCYTE (OHS): 49.4 % (ref 18–48)
RELATIVE MONOCYTE (OHS): 10.7 % (ref 4–15)
RELATIVE NEUTROPHIL (OHS): 34.1 % (ref 38–73)
SODIUM SERPL-SCNC: 139 MMOL/L (ref 136–145)
WBC # BLD AUTO: 7.29 K/UL (ref 3.9–12.7)

## 2025-06-20 PROCEDURE — 63600175 PHARM REV CODE 636 W HCPCS: Performed by: INTERNAL MEDICINE

## 2025-06-20 PROCEDURE — 25000003 PHARM REV CODE 250: Performed by: INTERNAL MEDICINE

## 2025-06-20 PROCEDURE — 82374 ASSAY BLOOD CARBON DIOXIDE: CPT | Performed by: INTERNAL MEDICINE

## 2025-06-20 PROCEDURE — 83735 ASSAY OF MAGNESIUM: CPT | Performed by: INTERNAL MEDICINE

## 2025-06-20 PROCEDURE — 63600175 PHARM REV CODE 636 W HCPCS: Mod: JZ,TB | Performed by: HOSPITALIST

## 2025-06-20 PROCEDURE — 85025 COMPLETE CBC W/AUTO DIFF WBC: CPT | Performed by: INTERNAL MEDICINE

## 2025-06-20 PROCEDURE — 25000003 PHARM REV CODE 250: Performed by: HOSPITALIST

## 2025-06-20 PROCEDURE — 36415 COLL VENOUS BLD VENIPUNCTURE: CPT | Performed by: INTERNAL MEDICINE

## 2025-06-20 RX ORDER — NALOXONE HYDROCHLORIDE 4 MG/.1ML
1 SPRAY NASAL ONCE AS NEEDED
Qty: 2 EACH | Refills: 0 | Status: SHIPPED | OUTPATIENT
Start: 2025-06-20

## 2025-06-20 RX ORDER — OXYCODONE HYDROCHLORIDE 10 MG/1
10 TABLET ORAL EVERY 8 HOURS PRN
Qty: 15 TABLET | Refills: 0 | Status: SHIPPED | OUTPATIENT
Start: 2025-06-20 | End: 2025-07-11

## 2025-06-20 RX ORDER — GABAPENTIN 800 MG/1
800 TABLET ORAL 3 TIMES DAILY
Qty: 90 TABLET | Refills: 0 | Status: SHIPPED | OUTPATIENT
Start: 2025-06-20

## 2025-06-20 RX ADMIN — VALACYCLOVIR HYDROCHLORIDE 1000 MG: 500 TABLET, FILM COATED ORAL at 08:06

## 2025-06-20 RX ADMIN — MORPHINE SULFATE 6 MG: 2 INJECTION, SOLUTION INTRAMUSCULAR; INTRAVENOUS at 03:06

## 2025-06-20 RX ADMIN — DALBAVANCIN 1500 MG: 500 INJECTION, POWDER, FOR SOLUTION INTRAVENOUS at 11:06

## 2025-06-20 RX ADMIN — GABAPENTIN 600 MG: 300 CAPSULE ORAL at 08:06

## 2025-06-20 RX ADMIN — ACETAMINOPHEN 1000 MG: 500 TABLET, FILM COATED ORAL at 05:06

## 2025-06-20 RX ADMIN — VANCOMYCIN HYDROCHLORIDE 1000 MG: 1 INJECTION, POWDER, LYOPHILIZED, FOR SOLUTION INTRAVENOUS at 03:06

## 2025-06-20 NOTE — DISCHARGE SUMMARY
"[facility] Surg [address] Medicine  Discharge Summary      Patient Name: Leslie Gann  MRN: [id number]  ECHO: [id number]  Patient Class: IP- Inpatient  Admission Date: 6/13/2025  Hospital Length of Stay: 6 days  Discharge Date and Time: 6/20/2025  1:57 PM  Attending Physician: Trey Sexton MD  Discharging Provider: Trey Sexton MD  Primary Care Provider: Cailin Shirley FNP-ИРИНА    HPI:   Per Rakesh Morrell, NP:    "The patient is a 45 y.o. female with medical history of IV drug abuse (last injected 1 hour prior to coming to the ED), recent shingles infection, and leg wounds who presents with bilateral leg pain with associated redness and erythema.  Patient states she does "inject dope and cocaine and has multiple abscesses due to injection sites.  Patient states increased pain in her right knee with difficulty ambulating.  Patient endorsing fever and chills.  No nausea or vomiting.  CT positive for small abscess to right femoral medial condyle and various areas of subq air likely due to injection sites.  She will be admitted for further management of her right knee abscess."    Procedure(s) (LRB):  ECHOCARDIOGRAM,TRANSESOPHAGEAL (N/A)      Hospital Course:   Patient is a 45 year woman with intravenous drug use admitted to the hospital with sepsis secondary to skin soft tissue infections and septic arthritis of her left knee with MRSA bacteremia.  Patient underwent washout of the left knee in the operating room performed by Dr. Juan Alberto Ascencio on 6/15/2025.  Patient cleared her bacteremia.  Transesophageal echocardiogram negative for endocarditis.  MRSA bacteremia was treated with intravenous vancomycin.  Infectious Disease consulted and recommended patient complete treatment with intravenous dalbavancin.  Patient received a dose of dalbavancin in the hospital today followed by additional outpatient doses to be administered at an outpatient infusion center every 2 weeks for 2 " additional infusions.  Repeat labs ordered along with close outpatient follow up with [facility] with Dr. Nancy Dalton and Orthopedic Surgery clinic with Dr. Juan Alberto Ascencio advised.  Patient states she is committed to abstaining further intravenous drug use.  Substance abuse treatment recommended.  Referral for addiction Psychiatry placed for substance abuse treatment.  Midline discontinued prior to discharge.       Goals of Care Treatment Preferences:  Code Status: Full Code         Consults:   Consults (From admission, onward)          Status Ordering Provider     Inpatient consult to Cardiology  Once        Provider:  Deandre Jose MD    Completed KJ MARSH     Inpatient consult to Cardiology  Once        Provider:  Deandre Jose MD    Completed KJ MARSH     Inpatient consult to Midline team  Once        Provider:  (Not yet assigned)    Completed VINICIO JACQUES     Inpatient consult to Infectious Diseases  Once        Provider:  Nancy Dalton MD    Completed DEANDRE JOSE     Inpatient consult to General Surgery  Once        Provider:  Kj Lopez Jr., MD    Completed VINICIO JACQUES            Final Active Diagnoses:    Diagnosis Date Noted POA    PRINCIPAL PROBLEM:  Staphylococcal arthritis of left knee [M00.062] 06/14/2025 Yes    Sepsis [A41.9] 06/15/2025 Yes    Cellulitis [L03.90] 06/19/2025 Yes    Anemia [D64.9] 06/16/2025 Yes    Obesity [E66.9] 06/16/2025 Yes    Methicillin resistant Staphylococcus aureus infection [A49.02] 06/15/2025 Yes    Bacteremia due to methicillin resistant Staphylococcus aureus [R78.81, B95.62] 06/15/2025 Yes    Intravenous drug abuse, continuous [F19.10] 06/15/2025 Yes    Opioid use disorder [F11.90] 06/15/2025 Yes    Diarrhea [R19.7] 06/14/2025 Yes      Problems Resolved During this Admission:    Diagnosis Date Noted Date Resolved POA    Abscess of right lower extremity [L02.415] 06/14/2025 06/15/2025 Yes  "      Discharged Condition: Stable    Disposition: Home or Self Care    Follow Up:   Follow-up Information       Juan Alberto Ascencio MD Follow up on 7/3/2025.    Specialty: Orthopedic Surgery  Why: [address] 10:45  Contact information:  [address] [facility] [address]  [phone]               [facility]. Schedule an appointment as soon as possible for a visit.    Contact information:  Clinic will contact patient to set up time             Nancy Crane MD. Schedule an appointment as soon as possible for a visit in 2 week(s).    Specialty: Infectious Diseases  Contact information:  [address]  [facility] [address]  [phone]                           Patient Instructions:      CANE FOR HOME USE     Order Specific Question Answer Comments   Type of Cane: Straight    Height: 5' 5" (1.651 m)    Weight: 97.1 kg (214 lb)    Does patient have medical equipment at home? none    Length of need (1-99 months): 99    Please check all that apply: Patient's condition impairs ambulation.    Please check all that apply: Patient is unable to safely ambulate without equipment.      CBC W/ AUTO DIFFERENTIAL   Standing Status: Future Standing Exp. Date: 09/18/26     COMPREHENSIVE METABOLIC PANEL   Standing Status: Future Standing Exp. Date: 09/18/26     Order Specific Question Answer Comments   Send normal result to authorizing provider's In Basket if patient is active on MyChart: Yes      C-REACTIVE PROTEIN   Standing Status: Future Standing Exp. Date: 09/18/26     Order Specific Question Answer Comments   Send normal result to authorizing provider's In Basket if patient is active on MyChart: Yes      Ambulatory referral/consult to Infectious Disease   Standing Status: Future   Referral Priority: Routine Referral Type: Consultation   Referral Reason: Specialty Services Required   Referred to Provider: NANCY CRANE Requested Specialty: Infectious Diseases   Number of Visits Requested: 1 "     Ambulatory referral/consult to Orthopedics   Standing Status: Future   Referral Priority: Routine Referral Type: Consultation   Referred to Provider: RAGHAVENDRA SANTOS Requested Specialty: Orthopedic Surgery   Number of Visits Requested: 1     Ambulatory Referral/Consult to Occupational Therapy   Referral Priority: Routine Referral Type: Occupational Therapy   Referral Reason: Specialty Services Required   Requested Specialty: Occupational Therapy   Number of Visits Requested: 1     Ambulatory Referral/Consult to Physical Therapy   Referral Priority: Routine Referral Type: Physical Therapy   Referral Reason: Specialty Services Required   Number of Visits Requested: 1     Ambulatory referral/consult to Addiction Psychiatry   Standing Status: Future   Referral Priority: Urgent Referral Type: Psychiatric   Referral Reason: Specialty Services Required   Requested Specialty: Addiction Medicine   Number of Visits Requested: 1     Diet Adult Regular     Diet Adult Regular     Notify your health care provider if you experience any of the following:  increased confusion or weakness     Notify your health care provider if you experience any of the following:  persistent dizziness, light-headedness, or visual disturbances     Notify your health care provider if you experience any of the following:  worsening rash     Notify your health care provider if you experience any of the following:  severe persistent headache     Notify your health care provider if you experience any of the following:  difficulty breathing or increased cough     Notify your health care provider if you experience any of the following:  redness, tenderness, or signs of infection (pain, swelling, redness, odor or green/yellow discharge around incision site)     Notify your health care provider if you experience any of the following:  severe uncontrolled pain     Notify your health care provider if you experience any of the following:  persistent nausea  and vomiting or diarrhea     Notify your health care provider if you experience any of the following:  temperature >100.4     Notify your health care provider if you experience any of the following:  temperature >100.4     Notify your health care provider if you experience any of the following:  persistent nausea and vomiting or diarrhea     Notify your health care provider if you experience any of the following:  severe uncontrolled pain     Notify your health care provider if you experience any of the following:  difficulty breathing or increased cough     Notify your health care provider if you experience any of the following:  redness, tenderness, or signs of infection (pain, swelling, redness, odor or green/yellow discharge around incision site)     Notify your health care provider if you experience any of the following:  severe persistent headache     Notify your health care provider if you experience any of the following:  worsening rash     Notify your health care provider if you experience any of the following:  persistent dizziness, light-headedness, or visual disturbances     Notify your health care provider if you experience any of the following:  increased confusion or weakness     Activity as tolerated     Activity as tolerated     Medications:  Reconciled Home Medications:      Medication List        START taking these medications      acetaminophen 500 MG tablet  Commonly known as: TYLENOL  Take 2 tablets (1,000 mg total) by mouth every 8 (eight) hours as needed for Pain.     INV dalbavancin 500 mg Soln injection  Give 1000 mg IV on 7/3/2025 and 7/17/2025     lactobacillus acidophilus & bulgar 100 million cell packet  Commonly known as: LACTINEX  Take 1 packet (1 each total) by mouth 2 (two) times daily.     LIDOcaine 5 %  Commonly known as: LIDODERM  Place 1 patch onto the skin once daily. Remove & Discard patch within 12 hours or as directed by MD     naloxone 4 mg/actuation Spry  Commonly known  as: NARCAN  1 spray (4 mg total) by Nasal route 1 (one) time if needed (Opiod overdose).     oxyCODONE 10 mg Tab immediate release tablet  Commonly known as: ROXICODONE  Take 1 tablet (10 mg total) by mouth every 8 (eight) hours as needed (Pain not controlled by acetaminophen).            CONTINUE taking these medications      gabapentin 800 MG tablet  Commonly known as: NEURONTIN  Take 1 tablet (800 mg total) by mouth 3 (three) times daily.            STOP taking these medications      drospirenone-ethinyl estradioL 3-0.02 mg per tablet  Commonly known as: FRANCISCO     famotidine 20 MG tablet  Commonly known as: PEPCID     HYDROcodone-acetaminophen  mg per tablet  Commonly known as: NORCO     ibuprofen 600 MG tablet  Commonly known as: ADVIL,MOTRIN     LIDOcaine-prilocaine cream  Commonly known as: EMLA     METHADONE ORAL     metoclopramide HCl 10 MG tablet  Commonly known as: REGLAN     valACYclovir 1000 MG tablet  Commonly known as: VALTREX              Indwelling Lines/Drains at time of discharge:   Lines/Drains/Airways       None                   SDOH Screening:  The patient was screened for food insecurity, housing instability, transportation needs, utility difficulties, and interpersonal safety. The social determinant(s) of health identified as a concern this admission are:  Food insecurity  Utility difficulties    Concerns were discussed with case management.    Social Drivers of Health with Concerns     Food Insecurity: Food Insecurity Present (6/16/2025)   Utilities: At Risk (6/16/2025)         Time spent on the discharge of patient: 60 minutes         Trey Sexton MD  Department of Hospital Medicine  [facility] ([address])

## 2025-06-20 NOTE — PLAN OF CARE
Pt lying in bed. Assessment completed; no pain or distress reported. Call light within reach and bed lowered.     1345  In agreement and eager for DC.  VU of DC instructions, paperwork and pt stated she will go to pharmacy for prescriptions. IV and midline  removed with cath tip intact, WNL.  To be DC home with family. Pt stated she refuses wheelchair and will ambulate to outpt pharmacy.   Free from falls or skin breakdown this hospital admission.

## 2025-06-20 NOTE — PLAN OF CARE
Problem: Adult Inpatient Plan of Care  Goal: Plan of Care Review  Outcome: Progressing  Goal: Patient-Specific Goal (Individualized)  Outcome: Progressing  Goal: Absence of Hospital-Acquired Illness or Injury  Outcome: Progressing  Goal: Optimal Comfort and Wellbeing  Outcome: Progressing  Goal: Readiness for Transition of Care  Outcome: Progressing     Problem: Infection  Goal: Absence of Infection Signs and Symptoms  Outcome: Progressing     Problem: Sepsis/Septic Shock  Goal: Optimal Coping  Outcome: Progressing  Goal: Absence of Bleeding  Outcome: Progressing  Goal: Blood Glucose Level Within Targeted Range  Outcome: Progressing  Goal: Absence of Infection Signs and Symptoms  Outcome: Progressing  Goal: Optimal Nutrition Intake  Outcome: Progressing     Problem: Wound  Goal: Optimal Coping  Outcome: Progressing  Goal: Optimal Functional Ability  Outcome: Progressing  Goal: Absence of Infection Signs and Symptoms  Outcome: Progressing  Goal: Improved Oral Intake  Outcome: Progressing  Goal: Optimal Pain Control and Function  Outcome: Progressing  Goal: Skin Health and Integrity  Outcome: Progressing  Goal: Optimal Wound Healing  Outcome: Progressing     Problem: Fall Injury Risk  Goal: Absence of Fall and Fall-Related Injury  Outcome: Progressing

## 2025-06-20 NOTE — PLAN OF CARE
CM informed patient referral for cane was sent to [employer].     CM informed patient that [employer] would call for DME delivery or patient could pick it up    CM called [employer] to confirm receipt of referral, facesheet and PT notes

## 2025-06-20 NOTE — PT/OT/SLP PROGRESS
Physical Therapy      Patient Name:  eLslie Gann   MRN:  [id number]    Patient not seen today secondary to Other (Comment) (Pt waiting to d/c home, all needs met, except waiting for straight cane to be delivered to room (CM contacted)).

## 2025-06-20 NOTE — PLAN OF CARE
Infectious Diseases Plan of Care Note    Chart reviewed only.     Notified that no in-network home infusion companies will accept patient due to her recent active injection drug use. Alternative options are LTAC, dalbavancin or PO antibiotics. Given social constraints including need to provide childcare for her eight year old son, have determined that dalbavancin is the second best choice.  has located an infusion company which will administer home dalbavancin instead. This is not first line treatment of MRSA bacteremia and septic arthritis. Continue vancomycin goal trough 15-20 mcg/ml with dosing per pharmacy until discharged.     Outpatient Antibiotic Therapy Plan:    Please send referral to [facility].    1) Infection: MRSA bacteremia and L knee septic arthritis     2) Discharge Antibiotics: dalbavancin     Intravenous antibiotics:  1500mg IV on day 0 followed by 1000 mg on day 14 and on day 28 for a total of 3 doses over 6 weeks     3) Therapy Duration:  6 weeks     Estimated end date of IV antibiotics: last dose on or around 7/18/2025 depending on when first dose can be administered     4) Outpatient Weekly Labs:    Order the following labs to be drawn on Mondays:   CBC  CMP   CRP    5) Fax Lab Results to Infectious Diseases Provider: Krystle    [facility] Fax Number: [phone]    6) Outpatient Infectious Diseases Follow-up    Follow-up appointment will be arranged by the ID clinic and will be found in the patient's appointments tab.    Prior to discharge, please ensure the patient's follow-up has been scheduled.    If there is still no follow-up scheduled prior to discharge, please send an [employer] message to [facility] [place] or Call Infectious Diseases Dept.      Nancy Dalton MD  Infectious Diseases Staff

## 2025-06-20 NOTE — PLAN OF CARE
Case Management Final Discharge Note    Discharge Disposition: Home    New DME ordered / company name: [employer]    Relevant SDOH / Transition of Care Barriers:  substance abuse    Person available to provide assistance at home when needed and their contact information:     Antoinette Gann (Mother)  [phone] (Home Phone)       Scheduled followup appointment: Ortho     Referrals placed: none    Transportation: father will transport home        Patient and family educated on discharge services and updated on DC plan. Bedside RN notified, patient clear to discharge from Case Management Perspective.      [facility] ([address])  Discharge Final Note    Primary Care Provider: Cailin Shirley FNP-C    Expected Discharge Date: 6/20/2025    Final Discharge Note (most recent)       Final Note - 06/20/25 1252          Final Note    Assessment Type Final Discharge Note (P)      Anticipated Discharge Disposition Home or Self Care (P)      What phone number can be called within the next 1-3 days to see how you are doing after discharge? [phone] (P)      Hospital Resources/Appts/Education Provided Provided patient/caregiver with written discharge plan information;Community resources provided;Appointments scheduled and added to AVS (P)         Post-Acute Status    Post-Acute Authorization IV Infusion (P)      IV Infusion Status Set-up Complete/Auth obtained (P)      Discharge Delays None known at this time (P)                      Important Message from Medicare             Contact Info       Juan Alberto Ascencio MD   Specialty: Orthopedic Surgery    [address] [place] [id number]   Phone: [phone]       Next Steps: Follow up on 7/3/2025    Instructions: [address] 10:45    [facility]    Clinic will contact patient to set up time       Next Steps: Schedule an appointment as soon as possible for a visit

## 2025-06-21 LAB
BACTERIA BLD CULT: NORMAL
BACTERIA BLD CULT: NORMAL

## 2025-06-27 ENCOUNTER — DOCUMENTATION ONLY (OUTPATIENT)
Dept: REHABILITATION | Facility: OTHER | Age: 45
End: 2025-06-27
Payer: MEDICAID

## 2025-06-27 ENCOUNTER — TELEPHONE (OUTPATIENT)
Dept: INFECTIOUS DISEASES | Facility: CLINIC | Age: 45
End: 2025-06-27
Payer: MEDICAID

## 2025-06-27 NOTE — PROGRESS NOTES
Pt did not attend her 6/27/25 OT evaluation. She did not contact the clinic to cancel or re-schedule.     1 no show

## 2025-07-01 ENCOUNTER — RESULTS FOLLOW-UP (OUTPATIENT)
Dept: EMERGENCY MEDICINE | Facility: HOSPITAL | Age: 45
End: 2025-07-01

## 2025-07-01 ENCOUNTER — HOSPITAL ENCOUNTER (OUTPATIENT)
Facility: HOSPITAL | Age: 45
Discharge: HOME | End: 2025-07-02
Attending: EMERGENCY MEDICINE | Admitting: STUDENT IN AN ORGANIZED HEALTH CARE EDUCATION/TRAINING PROGRAM
Payer: MEDICAID

## 2025-07-01 DIAGNOSIS — M79.89 LEG SWELLING: ICD-10-CM

## 2025-07-01 DIAGNOSIS — M25.469 KNEE EFFUSION: ICD-10-CM

## 2025-07-01 DIAGNOSIS — F11.90 OPIOID USE DISORDER: ICD-10-CM

## 2025-07-01 DIAGNOSIS — L03.90 CELLULITIS: ICD-10-CM

## 2025-07-01 DIAGNOSIS — M25.569 KNEE PAIN: ICD-10-CM

## 2025-07-01 DIAGNOSIS — Z13.6 SCREENING FOR CARDIOVASCULAR CONDITION: ICD-10-CM

## 2025-07-01 DIAGNOSIS — R07.9 CHEST PAIN: ICD-10-CM

## 2025-07-01 DIAGNOSIS — M00.062 STAPHYLOCOCCAL ARTHRITIS OF LEFT KNEE: Primary | ICD-10-CM

## 2025-07-01 LAB
ABSOLUTE EOSINOPHIL (OHS): 0.09 K/UL
ABSOLUTE MONOCYTE (OHS): 0.41 K/UL (ref 0.3–1)
ABSOLUTE NEUTROPHIL COUNT (OHS): 2.73 K/UL (ref 1.8–7.7)
ALBUMIN SERPL BCP-MCNC: 3.5 G/DL (ref 3.5–5.2)
ALP SERPL-CCNC: 174 UNIT/L (ref 40–150)
ALT SERPL W/O P-5'-P-CCNC: 36 UNIT/L (ref 10–44)
ANION GAP (OHS): 9 MMOL/L (ref 8–16)
AST SERPL-CCNC: 62 UNIT/L (ref 11–45)
BASOPHILS # BLD AUTO: 0.07 K/UL
BASOPHILS NFR BLD AUTO: 1.2 %
BILIRUB SERPL-MCNC: 0.3 MG/DL (ref 0.1–1)
BILIRUB UR QL STRIP.AUTO: NEGATIVE
BIPAP: 0
BUN SERPL-MCNC: 12 MG/DL (ref 6–20)
CALCIUM SERPL-MCNC: 9.7 MG/DL (ref 8.7–10.5)
CHLORIDE SERPL-SCNC: 104 MMOL/L (ref 95–110)
CLARITY UR: CLEAR
CO2 SERPL-SCNC: 25 MMOL/L (ref 23–29)
COLOR UR AUTO: YELLOW
CREAT SERPL-MCNC: 0.9 MG/DL (ref 0.5–1.4)
CRP SERPL-MCNC: 39 MG/L
ERYTHROCYTE [DISTWIDTH] IN BLOOD BY AUTOMATED COUNT: 15.7 % (ref 11.5–14.5)
ERYTHROCYTE [SEDIMENTATION RATE] IN BLOOD BY PHOTOMETRIC METHOD: 112 MM/HR
FIO2: 21 %
GFR SERPLBLD CREATININE-BSD FMLA CKD-EPI: >60 ML/MIN/1.73/M2
GLUCOSE SERPL-MCNC: 113 MG/DL (ref 70–110)
GLUCOSE UR QL STRIP: NEGATIVE
HCT VFR BLD AUTO: 34.6 % (ref 37–48.5)
HCV AB SERPL QL IA: REACTIVE
HGB BLD-MCNC: 10.5 GM/DL (ref 12–16)
HGB UR QL STRIP: NEGATIVE
HIV 1+2 AB+HIV1 P24 AG SERPL QL IA: NORMAL
IMM GRANULOCYTES # BLD AUTO: 0.01 K/UL (ref 0–0.04)
IMM GRANULOCYTES NFR BLD AUTO: 0.2 % (ref 0–0.5)
KETONES UR QL STRIP: NEGATIVE
LDH SERPL L TO P-CCNC: 1.6 MMOL/L (ref 0.5–2.2)
LEUKOCYTE ESTERASE UR QL STRIP: NEGATIVE
LYMPHOCYTES # BLD AUTO: 2.44 K/UL (ref 1–4.8)
MCH RBC QN AUTO: 26.1 PG (ref 27–31)
MCHC RBC AUTO-ENTMCNC: 30.3 G/DL (ref 32–36)
MCV RBC AUTO: 86 FL (ref 82–98)
NITRITE UR QL STRIP: NEGATIVE
NUCLEATED RBC (/100WBC) (OHS): 0 /100 WBC
OHS QRS DURATION: 76 MS
OHS QTC CALCULATION: 425 MS
PH UR STRIP: 7 [PH]
PLATELET # BLD AUTO: 378 K/UL (ref 150–450)
PMV BLD AUTO: 8.9 FL (ref 9.2–12.9)
POC PERFORMED BY: NORMAL
POC TEMPERATURE: 37 C
POTASSIUM SERPL-SCNC: 4.2 MMOL/L (ref 3.5–5.1)
PROT SERPL-MCNC: 9.8 GM/DL (ref 6–8.4)
PROT UR QL STRIP: ABNORMAL
RBC # BLD AUTO: 4.03 M/UL (ref 4–5.4)
RELATIVE EOSINOPHIL (OHS): 1.6 %
RELATIVE LYMPHOCYTE (OHS): 42.4 % (ref 18–48)
RELATIVE MONOCYTE (OHS): 7.1 % (ref 4–15)
RELATIVE NEUTROPHIL (OHS): 47.5 % (ref 38–73)
SODIUM SERPL-SCNC: 138 MMOL/L (ref 136–145)
SP GR UR STRIP: 1.02
SPECIMEN SOURCE: NORMAL
UROBILINOGEN UR STRIP-ACNC: ABNORMAL EU/DL
WBC # BLD AUTO: 5.75 K/UL (ref 3.9–12.7)

## 2025-07-01 PROCEDURE — 83605 ASSAY OF LACTIC ACID: CPT

## 2025-07-01 PROCEDURE — 85652 RBC SED RATE AUTOMATED: CPT

## 2025-07-01 PROCEDURE — 96361 HYDRATE IV INFUSION ADD-ON: CPT

## 2025-07-01 PROCEDURE — 81003 URINALYSIS AUTO W/O SCOPE: CPT | Performed by: EMERGENCY MEDICINE

## 2025-07-01 PROCEDURE — G0378 HOSPITAL OBSERVATION PER HR: HCPCS

## 2025-07-01 PROCEDURE — 96366 THER/PROPH/DIAG IV INF ADDON: CPT

## 2025-07-01 PROCEDURE — 86803 HEPATITIS C AB TEST: CPT | Performed by: PHYSICIAN ASSISTANT

## 2025-07-01 PROCEDURE — 87522 HEPATITIS C REVRS TRNSCRPJ: CPT | Performed by: PHYSICIAN ASSISTANT

## 2025-07-01 PROCEDURE — 86140 C-REACTIVE PROTEIN: CPT

## 2025-07-01 PROCEDURE — 93010 ELECTROCARDIOGRAM REPORT: CPT | Mod: ,,, | Performed by: INTERNAL MEDICINE

## 2025-07-01 PROCEDURE — 93005 ELECTROCARDIOGRAM TRACING: CPT

## 2025-07-01 PROCEDURE — 25000003 PHARM REV CODE 250

## 2025-07-01 PROCEDURE — 99285 EMERGENCY DEPT VISIT HI MDM: CPT | Mod: 25

## 2025-07-01 PROCEDURE — 87040 BLOOD CULTURE FOR BACTERIA: CPT | Performed by: EMERGENCY MEDICINE

## 2025-07-01 PROCEDURE — 85025 COMPLETE CBC W/AUTO DIFF WBC: CPT | Performed by: EMERGENCY MEDICINE

## 2025-07-01 PROCEDURE — 96365 THER/PROPH/DIAG IV INF INIT: CPT

## 2025-07-01 PROCEDURE — 99900035 HC TECH TIME PER 15 MIN (STAT)

## 2025-07-01 PROCEDURE — 87389 HIV-1 AG W/HIV-1&-2 AB AG IA: CPT | Performed by: PHYSICIAN ASSISTANT

## 2025-07-01 PROCEDURE — 63600175 PHARM REV CODE 636 W HCPCS

## 2025-07-01 PROCEDURE — 80053 COMPREHEN METABOLIC PANEL: CPT | Performed by: EMERGENCY MEDICINE

## 2025-07-01 RX ORDER — ACETAMINOPHEN 325 MG/1
650 TABLET ORAL EVERY 6 HOURS PRN
Status: DISCONTINUED | OUTPATIENT
Start: 2025-07-01 | End: 2025-07-02 | Stop reason: HOSPADM

## 2025-07-01 RX ORDER — IPRATROPIUM BROMIDE AND ALBUTEROL SULFATE 2.5; .5 MG/3ML; MG/3ML
3 SOLUTION RESPIRATORY (INHALATION) EVERY 4 HOURS PRN
Status: DISCONTINUED | OUTPATIENT
Start: 2025-07-01 | End: 2025-07-02 | Stop reason: HOSPADM

## 2025-07-01 RX ORDER — BISACODYL 10 MG/1
10 SUPPOSITORY RECTAL DAILY PRN
Status: DISCONTINUED | OUTPATIENT
Start: 2025-07-01 | End: 2025-07-01

## 2025-07-01 RX ORDER — GLUCAGON 1 MG
1 KIT INJECTION
Status: DISCONTINUED | OUTPATIENT
Start: 2025-07-01 | End: 2025-07-02 | Stop reason: HOSPADM

## 2025-07-01 RX ORDER — IBUPROFEN 600 MG/1
600 TABLET, FILM COATED ORAL EVERY 6 HOURS PRN
Status: DISCONTINUED | OUTPATIENT
Start: 2025-07-01 | End: 2025-07-01

## 2025-07-01 RX ORDER — ONDANSETRON HYDROCHLORIDE 2 MG/ML
4 INJECTION, SOLUTION INTRAVENOUS EVERY 8 HOURS PRN
Status: DISCONTINUED | OUTPATIENT
Start: 2025-07-01 | End: 2025-07-02 | Stop reason: HOSPADM

## 2025-07-01 RX ORDER — TALC
6 POWDER (GRAM) TOPICAL NIGHTLY PRN
Status: DISCONTINUED | OUTPATIENT
Start: 2025-07-01 | End: 2025-07-02 | Stop reason: HOSPADM

## 2025-07-01 RX ORDER — ENOXAPARIN SODIUM 100 MG/ML
40 INJECTION SUBCUTANEOUS EVERY 24 HOURS
Status: DISCONTINUED | OUTPATIENT
Start: 2025-07-02 | End: 2025-07-02 | Stop reason: HOSPADM

## 2025-07-01 RX ORDER — DICYCLOMINE HYDROCHLORIDE 10 MG/1
10 CAPSULE ORAL EVERY 6 HOURS PRN
Status: DISCONTINUED | OUTPATIENT
Start: 2025-07-01 | End: 2025-07-02 | Stop reason: HOSPADM

## 2025-07-01 RX ORDER — BACLOFEN 5 MG/1
5 TABLET ORAL EVERY 8 HOURS PRN
Status: DISCONTINUED | OUTPATIENT
Start: 2025-07-01 | End: 2025-07-02 | Stop reason: HOSPADM

## 2025-07-01 RX ORDER — NALOXONE HCL 0.4 MG/ML
0.02 VIAL (ML) INJECTION
Status: DISCONTINUED | OUTPATIENT
Start: 2025-07-01 | End: 2025-07-02 | Stop reason: HOSPADM

## 2025-07-01 RX ORDER — IBUPROFEN 200 MG
16 TABLET ORAL
Status: DISCONTINUED | OUTPATIENT
Start: 2025-07-01 | End: 2025-07-02 | Stop reason: HOSPADM

## 2025-07-01 RX ORDER — SODIUM CHLORIDE 0.9 % (FLUSH) 0.9 %
5 SYRINGE (ML) INJECTION
Status: DISCONTINUED | OUTPATIENT
Start: 2025-07-01 | End: 2025-07-02 | Stop reason: HOSPADM

## 2025-07-01 RX ORDER — KETOROLAC TROMETHAMINE 30 MG/ML
15 INJECTION, SOLUTION INTRAMUSCULAR; INTRAVENOUS EVERY 6 HOURS PRN
Status: DISCONTINUED | OUTPATIENT
Start: 2025-07-01 | End: 2025-07-02 | Stop reason: HOSPADM

## 2025-07-01 RX ORDER — IBUPROFEN 200 MG
24 TABLET ORAL
Status: DISCONTINUED | OUTPATIENT
Start: 2025-07-01 | End: 2025-07-02 | Stop reason: HOSPADM

## 2025-07-01 RX ORDER — LOPERAMIDE HYDROCHLORIDE 2 MG/1
2 CAPSULE ORAL
Status: DISCONTINUED | OUTPATIENT
Start: 2025-07-01 | End: 2025-07-02 | Stop reason: HOSPADM

## 2025-07-01 RX ADMIN — SODIUM CHLORIDE 2500 MG: 9 INJECTION, SOLUTION INTRAVENOUS at 09:07

## 2025-07-01 RX ADMIN — SODIUM CHLORIDE, POTASSIUM CHLORIDE, SODIUM LACTATE AND CALCIUM CHLORIDE 1000 ML: 600; 310; 30; 20 INJECTION, SOLUTION INTRAVENOUS at 05:07
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
## 2025-07-01 NOTE — ED TRIAGE NOTES
Pt presents to ED for c/o BLE swelling beginning 3 days ago. Pt recently admitted for sepsis and is supposed to go to clinic tomorrow for abx infusions. Recent sepsis from septic joint in left knee from IV drug use. Pt also endorses HTN, fever, and chills. Pt endorses some loose stool beginning yesterday.

## 2025-07-01 NOTE — FIRST PROVIDER EVALUATION
Medical screening examination initiated.  I have conducted a focused provider triage encounter, findings are as follows:    Brief history of present illness:  reports surgery at [facility] 1 week ago on knee. Chart review shows washout of L knee on 6/15/25 w/ Dr. Ascencio    There were no vitals filed for this visit.    Pertinent physical exam:  incision to L knee    Brief workup plan:  cbc, cmp, crp, blood cultures    Preliminary workup initiated; this workup will be continued and followed by the physician or advanced practice provider that is assigned to the patient when roomed.

## 2025-07-02 VITALS
TEMPERATURE: 99 F | BODY MASS INDEX: 36.65 KG/M2 | RESPIRATION RATE: 20 BRPM | WEIGHT: 220 LBS | HEART RATE: 79 BPM | DIASTOLIC BLOOD PRESSURE: 74 MMHG | OXYGEN SATURATION: 98 % | HEIGHT: 65 IN | SYSTOLIC BLOOD PRESSURE: 122 MMHG

## 2025-07-02 PROBLEM — B95.62 BACTEREMIA DUE TO METHICILLIN RESISTANT STAPHYLOCOCCUS AUREUS: Status: RESOLVED | Noted: 2025-06-15 | Resolved: 2025-07-02

## 2025-07-02 PROBLEM — M79.604 BILATERAL LOWER EXTREMITY PAIN: Status: ACTIVE | Noted: 2025-07-02

## 2025-07-02 PROBLEM — M79.605 BILATERAL LOWER EXTREMITY PAIN: Status: ACTIVE | Noted: 2025-07-02

## 2025-07-02 PROBLEM — R78.81 BACTEREMIA DUE TO METHICILLIN RESISTANT STAPHYLOCOCCUS AUREUS: Status: RESOLVED | Noted: 2025-06-15 | Resolved: 2025-07-02

## 2025-07-02 LAB
ABSOLUTE EOSINOPHIL (OHS): 0.14 K/UL
ABSOLUTE MONOCYTE (OHS): 0.62 K/UL (ref 0.3–1)
ABSOLUTE NEUTROPHIL COUNT (OHS): 2.68 K/UL (ref 1.8–7.7)
ALBUMIN SERPL BCP-MCNC: 3 G/DL (ref 3.5–5.2)
ALP SERPL-CCNC: 137 UNIT/L (ref 40–150)
ALT SERPL W/O P-5'-P-CCNC: 29 UNIT/L (ref 10–44)
ANION GAP (OHS): 7 MMOL/L (ref 8–16)
AST SERPL-CCNC: 50 UNIT/L (ref 11–45)
BASOPHILS # BLD AUTO: 0.06 K/UL
BASOPHILS NFR BLD AUTO: 0.9 %
BILIRUB SERPL-MCNC: 0.2 MG/DL (ref 0.1–1)
BUN SERPL-MCNC: 12 MG/DL (ref 6–20)
CALCIUM SERPL-MCNC: 8.9 MG/DL (ref 8.7–10.5)
CHLORIDE SERPL-SCNC: 103 MMOL/L (ref 95–110)
CO2 SERPL-SCNC: 23 MMOL/L (ref 23–29)
CREAT SERPL-MCNC: 0.7 MG/DL (ref 0.5–1.4)
ERYTHROCYTE [DISTWIDTH] IN BLOOD BY AUTOMATED COUNT: 15.6 % (ref 11.5–14.5)
GFR SERPLBLD CREATININE-BSD FMLA CKD-EPI: >60 ML/MIN/1.73/M2
GLUCOSE SERPL-MCNC: 101 MG/DL (ref 70–110)
HCT VFR BLD AUTO: 31.5 % (ref 37–48.5)
HCV RNA SERPL NAA+PROBE-LOG IU: NOT DETECTED {LOG_IU}/ML
HGB BLD-MCNC: 9.7 GM/DL (ref 12–16)
HOLD SPECIMEN: NORMAL
IMM GRANULOCYTES # BLD AUTO: 0.01 K/UL (ref 0–0.04)
IMM GRANULOCYTES NFR BLD AUTO: 0.2 % (ref 0–0.5)
LYMPHOCYTES # BLD AUTO: 2.92 K/UL (ref 1–4.8)
MCH RBC QN AUTO: 26.2 PG (ref 27–31)
MCHC RBC AUTO-ENTMCNC: 30.8 G/DL (ref 32–36)
MCV RBC AUTO: 85 FL (ref 82–98)
NUCLEATED RBC (/100WBC) (OHS): 0 /100 WBC
PLATELET # BLD AUTO: 326 K/UL (ref 150–450)
PMV BLD AUTO: 8.8 FL (ref 9.2–12.9)
POTASSIUM SERPL-SCNC: 4 MMOL/L (ref 3.5–5.1)
PROT SERPL-MCNC: 8.8 GM/DL (ref 6–8.4)
RBC # BLD AUTO: 3.7 M/UL (ref 4–5.4)
RELATIVE EOSINOPHIL (OHS): 2.2 %
RELATIVE LYMPHOCYTE (OHS): 45.4 % (ref 18–48)
RELATIVE MONOCYTE (OHS): 9.6 % (ref 4–15)
RELATIVE NEUTROPHIL (OHS): 41.7 % (ref 38–73)
SODIUM SERPL-SCNC: 133 MMOL/L (ref 136–145)
VANCOMYCIN SERPL-MCNC: 20.6 UG/ML (ref ?–80)
WBC # BLD AUTO: 6.43 K/UL (ref 3.9–12.7)

## 2025-07-02 PROCEDURE — 80053 COMPREHEN METABOLIC PANEL: CPT

## 2025-07-02 PROCEDURE — 96366 THER/PROPH/DIAG IV INF ADDON: CPT

## 2025-07-02 PROCEDURE — 25000003 PHARM REV CODE 250: Performed by: STUDENT IN AN ORGANIZED HEALTH CARE EDUCATION/TRAINING PROGRAM

## 2025-07-02 PROCEDURE — G0378 HOSPITAL OBSERVATION PER HR: HCPCS

## 2025-07-02 PROCEDURE — 25000003 PHARM REV CODE 250

## 2025-07-02 PROCEDURE — 85025 COMPLETE CBC W/AUTO DIFF WBC: CPT

## 2025-07-02 PROCEDURE — 99215 OFFICE O/P EST HI 40 MIN: CPT | Mod: ,,, | Performed by: PHYSICIAN ASSISTANT

## 2025-07-02 PROCEDURE — 63600175 PHARM REV CODE 636 W HCPCS: Performed by: STUDENT IN AN ORGANIZED HEALTH CARE EDUCATION/TRAINING PROGRAM

## 2025-07-02 PROCEDURE — 80202 ASSAY OF VANCOMYCIN: CPT | Performed by: STUDENT IN AN ORGANIZED HEALTH CARE EDUCATION/TRAINING PROGRAM

## 2025-07-02 RX ORDER — BUPRENORPHINE AND NALOXONE 2; .5 MG/1; MG/1
1 FILM, SOLUBLE BUCCAL; SUBLINGUAL 2 TIMES DAILY
Status: DISCONTINUED | OUTPATIENT
Start: 2025-07-02 | End: 2025-07-02 | Stop reason: HOSPADM

## 2025-07-02 RX ORDER — L. ACIDOPHILUS/L.BULGARICUS 100MM CELL
1 GRANULES IN PACKET (EA) ORAL 2 TIMES DAILY
Status: DISCONTINUED | OUTPATIENT
Start: 2025-07-02 | End: 2025-07-02 | Stop reason: HOSPADM

## 2025-07-02 RX ORDER — BUPRENORPHINE AND NALOXONE 2; .5 MG/1; MG/1
4 FILM, SOLUBLE BUCCAL; SUBLINGUAL 2 TIMES DAILY
Qty: 240 FILM | Refills: 1 | Status: SHIPPED | OUTPATIENT
Start: 2025-07-02 | End: 2026-07-02

## 2025-07-02 RX ORDER — GABAPENTIN 400 MG/1
800 CAPSULE ORAL 3 TIMES DAILY
Status: DISCONTINUED | OUTPATIENT
Start: 2025-07-02 | End: 2025-07-02 | Stop reason: HOSPADM

## 2025-07-02 RX ADMIN — BUPRENORPHINE AND NALOXONE 1 FILM: 2; .5 FILM BUCCAL; SUBLINGUAL at 02:07

## 2025-07-02 RX ADMIN — GABAPENTIN 800 MG: 400 CAPSULE ORAL at 02:07

## 2025-07-02 RX ADMIN — VANCOMYCIN HYDROCHLORIDE 1500 MG: 1.5 INJECTION, POWDER, LYOPHILIZED, FOR SOLUTION INTRAVENOUS at 08:07

## 2025-07-02 NOTE — PROGRESS NOTES
"Pharmacokinetic Initial Assessment: IV Vancomycin    Assessment/Plan:    Initiate intravenous vancomycin with loading dose of 2500 mg once followed by a maintenance dose of vancomycin 1500 mg IV every 12 hours  Desired empiric serum trough concentration is 10 to 20 mcg/mL  Draw vancomycin random level on 07/02/25 at 0830.  Pharmacy will continue to follow and monitor vancomycin.      Please contact pharmacy at extension [phone] with any questions regarding this assessment.     Thank you for the consult,   Thangneli Ludivina       Patient brief summary:  Leslie Gann is a 45 y.o. female initiated on antimicrobial therapy with IV Vancomycin for treatment of suspected skin & soft tissue infection    Drug Allergies:   Review of patient's allergies indicates:  No Known Allergies    Actual Body Weight:   99.8    Renal Function:   Estimated Creatinine Clearance: 91.4 mL/min (based on SCr of 0.9 mg/dL).,     Dialysis Method (if applicable):  N/A    CBC (last 72 hours):  Recent Labs   Lab Result Units 07/01/25  1600   WBC K/uL 5.75   HGB gm/dL 10.5*   HCT % 34.6*   Platelet Count K/uL 378   Lymph % % 42.4   Mono % % 7.1   Eos % % 1.6   Basophil % % 1.2       Metabolic Panel (last 72 hours):  Recent Labs   Lab Result Units 07/01/25  1600 07/01/25  1659   Sodium mmol/L 138  --    Potassium mmol/L 4.2  --    Chloride mmol/L 104  --    CO2 mmol/L 25  --    Glucose mg/dL 113*  --    Glucose, UA   --  Negative   BUN mg/dL 12  --    Creatinine mg/dL 0.9  --    Albumin g/dL 3.5  --    Bilirubin Total mg/dL 0.3  --    ALP unit/L 174*  --    AST unit/L 62*  --    ALT unit/L 36  --        Drug levels (last 3 results):  No results for input(s): "VANCOMYCINRA", "VANCORANDOM", "VANCOMYCINPE", "VANCOPEAK", "VANCOMYCINTR", "VANCOTROUGH" in the last 72 hours.    Microbiologic Results:  Microbiology Results (last 7 days)       Procedure Component Value Units Date/Time    Blood culture x two cultures. Draw prior to antibiotics. [[id number]]  " (Normal) Collected: 07/01/25 1600    Order Status: Completed Specimen: Blood from Peripheral, Forearm, Left Updated: 07/02/25 0103     Blood Culture No Growth After 6 Hours    Blood culture x two cultures. Draw prior to antibiotics. [[id number]]  (Normal) Collected: 07/01/25 1612    Order Status: Completed Specimen: Blood from Peripheral, Wrist, Right Updated: 07/02/25 0006     Blood Culture No Growth After 6 Hours
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
## 2025-07-02 NOTE — ED NOTES
LOC: The patient is awake, alert, aware of environment with an appropriate affect. Oriented x4, speaking appropriately  APPEARANCE: Pt resting comfortably, in no acute distress, pt is clean and well groomed, clothing properly fastened  SKIN:The skin is warm and dry, color consistent with ethnicity, patient has normal skin turgor and moist mucus membranes, no bruising noted. Post op incision on left leg   RESPIRATORY:Airway is open and patent, respirations are spontaneous, patient has a normal effort and rate, no accessory muscle use noted.  CARDIAC: Normal rate and rhythm, no peripheral edema noted, capillary refill < 3 seconds, bilateral radial pulses 2+.  ABDOMEN: Soft, non tender, non distended. Bowel sounds present x 4 quadrants.   NEUROLOGIC: PERRLA, facial expression is symmetrical, patient moving all extremities spontaneously, normal sensation in all extremities when touched with a finger.  Follows all commands appropriately  MUSCULOSKELETAL: Patient moving all extremities spontaneously, swelling in left leg

## 2025-07-02 NOTE — PROGRESS NOTES
Pharmacokinetic Assessment Follow Up: IV Vancomycin    Vancomycin serum concentration assessment(s):    The random level was drawn correctly and can be used to guide therapy at this time. The measurement is within the desired definitive target range of 10 to 20 mcg/mL.    Vancomycin Regimen Plan:    Continue regimen to Vancomycin 1500 mg IV every 12 hours with next serum trough concentration measured at 0830 prior to 4th dose on 7/3/25    Drug levels (last 3 results):  Recent Labs   Lab Result Units 07/02/25  0848   Vancomycin Random ug/ml 20.6       Pharmacy will continue to follow and monitor vancomycin.    Please contact pharmacy at extension [phone] for questions regarding this assessment.    Thank you for the consult,   Nadiya Pickering       Patient brief summary:  Leslie Gann is a 45 y.o. female initiated on antimicrobial therapy with IV Vancomycin for treatment of skin & soft tissue infection    The patient's current regimen is 1500 mg q12h    Drug Allergies:   Review of patient's allergies indicates:  No Known Allergies    Actual Body Weight:   99.8 kg    Renal Function:   Estimated Creatinine Clearance: 117.5 mL/min (based on SCr of 0.7 mg/dL).,       Recent Labs   Lab Result Units 07/01/25  1600 07/02/25  0404   WBC K/uL 5.75 6.43   HGB gm/dL 10.5* 9.7*   HCT % 34.6* 31.5*   Platelet Count K/uL 378 326   Lymph % % 42.4 45.4   Mono % % 7.1 9.6   Eos % % 1.6 2.2   Basophil % % 1.2 0.9       Metabolic Panel (last 72 hours):  Recent Labs   Lab Result Units 07/01/25  1600 07/01/25  1659 07/02/25  0404   Sodium mmol/L 138  --  133*   Potassium mmol/L 4.2  --  4.0   Chloride mmol/L 104  --  103   CO2 mmol/L 25  --  23   Glucose mg/dL 113*  --  101   Glucose, UA   --  Negative  --    BUN mg/dL 12  --  12   Creatinine mg/dL 0.9  --  0.7   Albumin g/dL 3.5  --  3.0*   Bilirubin Total mg/dL 0.3  --  0.2   ALP unit/L 174*  --  137   AST unit/L 62*  --  50*   ALT unit/L 36  --  29       Vancomycin  Administrations:  vancomycin given in the last 96 hours                     vancomycin 1,500 mg in 0.9% NaCl 250 mL IVPB (admixture device) (mg) 1,500 mg New Bag 07/02/25 0852    vancomycin (VANCOCIN) 2,500 mg in 0.9% NaCl 500 mL IVPB (mg) 2,500 mg New Bag 07/01/25 2130                    Microbiologic Results:  Microbiology Results (last 7 days)       Procedure Component Value Units Date/Time    Blood culture x two cultures. Draw prior to antibiotics. [[id number]]  (Normal) Collected: 07/01/25 1600    Order Status: Completed Specimen: Blood from Peripheral, Forearm, Left Updated: 07/02/25 0103     Blood Culture No Growth After 6 Hours    Blood culture x two cultures. Draw prior to antibiotics. [[id number]]  (Normal) Collected: 07/01/25 1612    Order Status: Completed Specimen: Blood from Peripheral, Wrist, Right Updated: 07/02/25 0006     Blood Culture No Growth After 6 Hours
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
## 2025-07-02 NOTE — PROGRESS NOTES
VANCOMYCIN DOSING BY PHARMACY DISCONTINUATION NOTE    Leslie Gann is a 45 y.o. female who had been consulted for vancomycin dosing.    The pharmacy consult for vancomycin dosing has been discontinued.     Vancomycin Dosing by Pharmacy Consult will sign-off. Please reconsult if necessary. Thank you for allowing us to participate in this patient's care.

## 2025-07-02 NOTE — PLAN OF CARE
[facility] - Emergency Dept  Initial Discharge Assessment       Primary Care Provider: Cailin Shirley FNP-ИРИНА    Admission Diagnosis: Cellulitis [L03.90]    Admission Date: 7/1/2025  Expected Discharge Date:     Transition of Care Barriers: (P) None    Payor: MEDICAID / Plan: [facility] COMMUNITY PLAN [facility] ([place] MEDICAID) / Product Type: Managed Medicaid /     Extended Emergency Contact Information  Primary Emergency Contact: Antoinette Gann  Address: [address]  Home Phone: [phone]  Relation: Mother    Discharge Plan A: (P) Home with family, Home  Discharge Plan B: (P) Home, Home with family      [id number] - [place], [place] - [address] [facility]  [address] [place] [address]  Phone: [phone] Fax: [phone]    [facility] - [place], [place] - [address]  [address] [facility] [address]  Phone: [phone] Fax: [phone]    [facility]  [address] [facility] [address]  [facility] [address]  Phone: [phone] Fax: [phone]      Initial Assessment (most recent)       Adult Discharge Assessment - 07/02/25 0334          Discharge Assessment    Assessment Type Discharge Planning Assessment (P)      Confirmed/corrected address, phone number and insurance Yes (P)      Confirmed Demographics Correct on Facesheet (P)      Source of Information patient (P)      Communicated LIVIER with patient/caregiver Yes (P)      People in Home child(jazz), dependent;parent(s) (P)      Name(s) of People in Home son and parents (P)      Facility Arrived From: home (P)      Do you expect to return to your current living situation? Yes (P)      Do you have help at home or someone to help you manage your care at home? No (P)      Prior to hospitilization cognitive status: Alert/Oriented (P)      Current cognitive status: Alert/Oriented (P)      Walking or  Climbing Stairs Difficulty yes (P)      Walking or Climbing Stairs ambulation difficulty, requires equipment (P)      Mobility Management cane (P)      Dressing/Bathing Difficulty no (P)      Home Accessibility not wheelchair accessible (P)      Home Layout Able to live on 1st floor (P)      Equipment Currently Used at Home cane, straight (P)      Readmission within 30 days? No (P)      Patient currently being followed by outpatient case management? No (P)      Do you currently have service(s) that help you manage your care at home? No (P)      Do you take prescription medications? Yes (P)      Do you have prescription coverage? Yes (P)      Do you have any problems affording any of your prescribed medications? No (P)      Is the patient taking medications as prescribed? yes (P)      Who is going to help you get home at discharge? Family (P)      How do you get to doctors appointments? car, drives self (P)      Are you on dialysis? No (P)      Do you take coumadin? No (P)      Discharge Plan A Home with family;Home (P)      Discharge Plan B Home;Home with family (P)      DME Needed Upon Discharge  none (P)      Discharge Plan discussed with: Patient (P)      Transition of Care Barriers None (P)         Physical Activity    On average, how many days per week do you engage in moderate to strenuous exercise (like a brisk walk)? 0 days (P)      On average, how many minutes do you engage in exercise at this level? 0 min (P)         Financial Resource Strain    How hard is it for you to pay for the very basics like food, housing, medical care, and heating? Not hard at all (P)         Housing Stability    In the last 12 months, was there a time when you were not able to pay the mortgage or rent on time? No (P)      At any time in the past 12 months, were you homeless or living in a shelter (including now)? No (P)         Transportation Needs    In the past 12 months, has lack of transportation kept you from medical  appointments or from getting medications? No (P)      In the past 12 months, has lack of transportation kept you from meetings, work, or from getting things needed for daily living? No (P)         Food Insecurity    Within the past 12 months, you worried that your food would run out before you got the money to buy more. Never true (P)      Within the past 12 months, the food you bought just didn't last and you didn't have money to get more. Never true (P)         Stress    Do you feel stress - tense, restless, nervous, or anxious, or unable to sleep at night because your mind is troubled all the time - these days? Only a little (P)         Social Isolation    How often do you feel lonely or isolated from those around you?  Never (P)         Alcohol Use    Q1: How often do you have a drink containing alcohol? Never (P)      Q2: How many drinks containing alcohol do you have on a typical day when you are drinking? Patient does not drink (P)      Q3: How often do you have six or more drinks on one occasion? Never (P)         Utilities    In the past 12 months has the electric, gas, oil, or water company threatened to shut off services in your home? No (P)         Health Literacy    How often do you need to have someone help you when you read instructions, pamphlets, or other written material from your doctor or pharmacy? Never (P)
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
## 2025-07-02 NOTE — HPI
Leslie Gann is a 45 y.o. female with PMHx of IV drug abuse (last injected yesterday), recent shingles infection, and leg wounds who presents with bilateral leg swelling and discomfort x4 days. Left leg has associated redness and erythema. Endorses fever the last few days. Swelling is worse in left leg and improves with elevation. Has been taking tylenol for fevers. Has been taking prescribed gabapentin for leg discomfort  but admits to taking extra doses 2/2 increased pain.  She admits to injecting IV drugs into bilateral legs, including the left knee area yesterday. She was recently admitted at [facility] with sepsis secondary to skin soft tissue infections and septic arthritis of her left knee with MRSA bacteremia.  Patient underwent washout of the left knee performed by Dr. Juan Alberto Ascencio on 6/15/2025.  She was discharged on 6/20/25, with 2 infusions of dalbavancin scheduled every 2 weeks. She is scheduled for infusion tomorrow. She is able to ambulate and has full ROM of the bilateral lower extremities.    ED: Tmax 99.8F,  otherwise HDS. WBC 5.75k. . CRP 39. Alp 174. Ast 62. Blood cultures collected. LOC Lactate 1.6. XR right knee with surgical change of the anterior soft tissues. No acute displaced fracture or dislocation, no radiopaque foreign body, and no large knee joint effusion. CXR no large focal consolidation. XR left knee with no fracture/dislocation. US without DVT. Orthopedic surgery was consulted. IV vancomycin and 1L IVF was given. Admitted to .

## 2025-07-02 NOTE — HPI
With a history of injection drug use in her lower extremities with cocaine and heroin, migraines, PCOS, seizure disorder who is seen for fever as well as lower extremity edema.  She was admitted mid last month for left knee pain and found to have positive blood cultures with MRSA as well as MRSA septic arthritis.  She underwent washout on 06/15 and those cultures were negative.  A TTE and CITLALY were negative for endocarditis.  She was followed by infectious and given her drug use it was felt that dalbavancin would be most appropriate for her..  She was to have dalbavancin 1.5 g on day 0, and 1 g on day 14 and again on day 28.  She completed the initial dose and was scheduled for the 2nd dose on 07/02/2025.  She came to the ED as she again injected the day prior which caused her legs to swell as well as to have a fever.  She came to the ED and has had blood cultures which are no growth to date.  She has been treated with vancomycin.  Sed rate obtained showing 116 and CRP noted to be 39 which is down from 131 on her prior admission.  ID is consulted for ongoing left knee septic arthritis.     Patient was seen and reports doing dramatically better she has been on vancomycin.  Her lower extremity edema has significantly improved.  She had fever after injecting her leg but that is resolved without recurrence.  She has no pain at rest.
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
## 2025-07-02 NOTE — PHARMACY MED REC
"          Admission Medication History     The home medication history was taken by Kriss Spivey.    You may go to "Admission" then "Reconcile Home Medications" tabs to review and/or act upon these items.     The home medication list has been updated by the Pharmacy department.   Please read ALL comments highlighted in yellow.   Please address this information as you see fit.    Feel free to contact us if you have any questions or require assistance.      The medications listed below were removed from the home medication list. Please reorder if appropriate:  Patient reports no longer taking the following medication(s):  lactobacillus acidophilus & bulgar (LACTINEX) 100 million cell packet  LIDOcaine (LIDODERM) 5 %    Medications listed below were obtained from: Patient/family and Analytic software- [employer]  Current Outpatient Medications on File Prior to Encounter   Medication Sig    acetaminophen (TYLENOL) 500 MG tablet Take 2 tablets (1,000 mg total) by mouth every 8 (eight) hours as needed for Pain.      gabapentin (NEURONTIN) 800 MG tablet Take 1 tablet (800 mg total) by mouth 3 (three) times daily.      naloxone (NARCAN) 4 mg/actuation Spry 1 spray (4 mg total) by Nasal route 1 (one) time if needed for Opiod overdose.      oxyCODONE (ROXICODONE) 10 mg Tab immediate release tablet Take 1 tablet (10 mg total) by mouth every 8 (eight) hours as needed if Pain not controlled by acetaminophen       Kriss Spivey  [id number]        .          "

## 2025-07-02 NOTE — ASSESSMENT & PLAN NOTE
Leslie Gann is a 45 y.o. female with PMH of IV drug abuse, septic arthritis of the left knee S/P irrigation and debridement on 6/15 presenting with bilateral lower extremity pain.  Patient endorses recent injection into the left leg yesterday 7/1, but states that both of her legs have been painful over the past several days.  Upon my arrival into the exam room, patient was standing up walking around the room.  She has excellent range of motion of both knees, but does have some superficial tenderness throughout both legs. Left knee incision site from prior washout appears to be well healing with no drainage, erythema, or local tenderness. AF. Wbc 5.75. . CRP 39 on 7/2, decreased from  two weeks ago. Low concern for septic arthritis in either knee.  No acute orthopedic intervention at this time.    - Admitted to hospital medicine  - WBAT bilateral LE  - Abx:  IV vancomycin per primary  - multimodal pain control limiting narcotics    No acute orthopedic intervention at this time.

## 2025-07-02 NOTE — ASSESSMENT & PLAN NOTE
- Counseled on cessation  - She plans to pursue an inpatient rehab program upon discharge  - COWS monitoring, PRNs for withdrawal

## 2025-07-02 NOTE — PLAN OF CARE
Ms. Gann is a 45-year-old lady with active IV drug abuse, recent shingles infection, recent MRSA septic arthritis of left knee status post washout at [facility] and currently on dalbavancin infusions.  She is readmitted with cellulitis involving left lower extremity.  Recent left knee wound was evaluated by Orthopedic surgery.  They have low concern for septic arthritis at this time.  ID is consulted to assist with planning treatment of recent septic arthritis and bacteremia.  Patient found to be positive for hepatitis-C antibody.  Quantitative PCR pending.  Will discuss therapy for opioid use disorder.
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
## 2025-07-02 NOTE — ASSESSMENT & PLAN NOTE
Recent history noted. Thought to be 2/2 IV drug use or septic left knee. Underwent irrigation and debridement / arthrotomy of her left knee on 6/15 with ortho. TTE and CITLALY were negative for vegetations. Was treated with IV vancomycin during recent admission then discharged on dalbavancin at the infusion center as infusion companies refused to discharge on vancomycin via picc line 2/2 history of IVDU. Was due for another dose of dalbavancin tomorrow.   - S/p IV vancomycin in ED - continue for now  - Follow up repeat blood cultures   - Follow up orthopedics recs  - Can consult ID in AM given worsening redness/erythema on outpatient regimen

## 2025-07-02 NOTE — ASSESSMENT & PLAN NOTE
Patient presenting with BLE swelling/pain with left-sided redness/warmth and fever at home. She has recent history of MRSA bacteremia, septic left knee and cellulitis. She has a history of IVDU and injected into the bilateral legs the past few days. She underwent irrigation and debridement / arthrotomy of her left knee on 6/15 with ortho. TTE and CITLALY were negative for vegetations. Was treated with IV vancomycin during recent admission then discharged on dalbavancin at the infusion center as infusion companies refused to discharge on vancomycin via picc line 2/2 history of IVDU. Was due for another dose of dalbavancin tomorrow. Physical exam concerning for LLE cellulitis.   - Afebrile with no leukocytosis.  - CRP and ESR are elevated   - S/p IV vancomycin in ED - continue for now  - Follow up repeat blood cultures   - Orthopedics consulted in ED, appreciate recs  - Can consult ID in AM given worsening redness/erythema on outpatient regimen   - PRN antipyretics, MM pain regimen   - Counseled on cessation of IVDU - she plans to pursue an inpatient program upon discharge

## 2025-07-02 NOTE — HPI
Leslie Gann is a 45 y.o. female with PMH significant for IV drug abuse (last injected 7/1/25), recent shingles infection, and leg wounds who presents with bilateral leg swelling and discomfort x4 days. Orthopedics consulted to assess left knee in setting of recent I&D for septic arthritis on 6/15/25 at [facility].  Left leg has associated redness and erythem, however, patient standing upon entry to the room. She states the antibiotics she is now taking have drastically improved her pain. She does endorse intermittent fevers over the past week, and endorses tenderness in bilateral LE that appears to be adjacent to surrounding injection sites. Patient denies any head trauma or LOC. The patient denies prior hx of falls. Patient denies numbness and tingling. Denies any other musculoskeletal pain or injuries.     Ambulates without assistance@ baseline   Nicotine/Tobacco Use:  Denies  IVDU: Heroin, last injected into left leg 7/1/25   Lives with her parents, 8 yr old son in [place]    Anticoagulation: None  Immunosuppressive medications: None   Occupation: Unemployed   No hx of MI, CVA, DVT/PE  No hx of cancer, chemotherapy, or radiation

## 2025-07-02 NOTE — ASSESSMENT & PLAN NOTE
Body mass index is 36.61 kg/m². Morbid obesity complicates all aspects of disease management from diagnostic modalities to treatment. Weight loss encouraged and health benefits explained to patient.

## 2025-07-02 NOTE — ASSESSMENT & PLAN NOTE
44-year-old female with a history of IVDA heroin and cocaine use injecting into her lower extremities with recent MRSA bacteremia and MRSA left knee septic arthritis on dalbavancin.  She has already had 1 dose of dalbavancin Mr. Dose on 07/02 due to an ED visit for fever as well as lower extremity swelling after again using IV drugs in her left lower extremity.  Blood cultures are no growth to date.  She is on vancomycin.  There was no signs of infection in her knee or soft tissues on exam.  CRP continues to decrease versus prior admit.  Orthopedic surgery had seen patient previously and had no concern for septic arthritis.  Had fever prior to admit after injecting drugs which likely was the cause.  She is stable nonseptic in appears well on exam.    Plan:  Okay to DC with follow up tomorrow for dose of dalbavancin at [facility] which has been arranged at 11:30 a.m.   Complete the dalbavancin course with 1 g tomorrow on 07/03 and then 2 weeks later as prior plan  Discussed with ID staff  Plan message to primary team, Dr. Alcantar

## 2025-07-03 ENCOUNTER — INFUSION (OUTPATIENT)
Dept: INFECTIOUS DISEASES | Facility: HOSPITAL | Age: 45
End: 2025-07-03
Attending: NURSE PRACTITIONER
Payer: MEDICAID

## 2025-07-03 VITALS
WEIGHT: 222.88 LBS | RESPIRATION RATE: 18 BRPM | SYSTOLIC BLOOD PRESSURE: 137 MMHG | HEIGHT: 65 IN | BODY MASS INDEX: 37.13 KG/M2 | HEART RATE: 102 BPM | TEMPERATURE: 99 F | DIASTOLIC BLOOD PRESSURE: 76 MMHG | OXYGEN SATURATION: 99 %

## 2025-07-03 DIAGNOSIS — M00.062 STAPHYLOCOCCAL ARTHRITIS OF LEFT KNEE: ICD-10-CM

## 2025-07-03 DIAGNOSIS — A49.02 METHICILLIN RESISTANT STAPHYLOCOCCUS AUREUS INFECTION: ICD-10-CM

## 2025-07-03 DIAGNOSIS — L03.90 CELLULITIS, UNSPECIFIED CELLULITIS SITE: Primary | ICD-10-CM

## 2025-07-03 PROCEDURE — 25000003 PHARM REV CODE 250: Performed by: STUDENT IN AN ORGANIZED HEALTH CARE EDUCATION/TRAINING PROGRAM

## 2025-07-03 PROCEDURE — 63600175 PHARM REV CODE 636 W HCPCS: Mod: JZ,TB | Performed by: STUDENT IN AN ORGANIZED HEALTH CARE EDUCATION/TRAINING PROGRAM

## 2025-07-03 PROCEDURE — 96365 THER/PROPH/DIAG IV INF INIT: CPT

## 2025-07-03 RX ORDER — HEPARIN 100 UNIT/ML
500 SYRINGE INTRAVENOUS
OUTPATIENT
Start: 2025-07-10

## 2025-07-03 RX ADMIN — DALBAVANCIN 1000 MG: 500 INJECTION, POWDER, FOR SOLUTION INTRAVENOUS at 01:07

## 2025-07-03 NOTE — PROGRESS NOTES
Pt here for Dalvance dose 1/2.     Limited head-to-toe assessment due to privacy issues and visit reason though the opportunity was given for patient to express any concerns

## 2025-07-04 LAB — HOLD SPECIMEN: NORMAL

## 2025-07-06 ENCOUNTER — HOSPITAL ENCOUNTER (EMERGENCY)
Facility: OTHER | Age: 45
Discharge: HOME | End: 2025-07-06
Attending: EMERGENCY MEDICINE
Payer: MEDICAID

## 2025-07-06 VITALS
OXYGEN SATURATION: 100 % | HEIGHT: 65 IN | BODY MASS INDEX: 36.99 KG/M2 | DIASTOLIC BLOOD PRESSURE: 70 MMHG | SYSTOLIC BLOOD PRESSURE: 114 MMHG | RESPIRATION RATE: 19 BRPM | WEIGHT: 222 LBS | HEART RATE: 108 BPM | TEMPERATURE: 98 F

## 2025-07-06 DIAGNOSIS — R07.9 CHEST PAIN: ICD-10-CM

## 2025-07-06 DIAGNOSIS — F19.10 POLYSUBSTANCE ABUSE: ICD-10-CM

## 2025-07-06 DIAGNOSIS — R00.2 PALPITATIONS: Primary | ICD-10-CM

## 2025-07-06 LAB
BACTERIA BLD CULT: NORMAL
BACTERIA BLD CULT: NORMAL
BILIRUB UR QL STRIP.AUTO: NEGATIVE
CLARITY UR: CLEAR
COLOR UR AUTO: COLORLESS
GLUCOSE UR QL STRIP: NEGATIVE
HGB UR QL STRIP: NEGATIVE
KETONES UR QL STRIP: NEGATIVE
LEUKOCYTE ESTERASE UR QL STRIP: NEGATIVE
NITRITE UR QL STRIP: NEGATIVE
PH UR STRIP: 7 [PH]
PROT UR QL STRIP: NEGATIVE
SP GR UR STRIP: <1.005
UROBILINOGEN UR STRIP-ACNC: NEGATIVE EU/DL

## 2025-07-06 PROCEDURE — 81003 URINALYSIS AUTO W/O SCOPE: CPT | Performed by: EMERGENCY MEDICINE

## 2025-07-06 PROCEDURE — 93010 ELECTROCARDIOGRAM REPORT: CPT | Mod: ,,, | Performed by: INTERNAL MEDICINE

## 2025-07-06 PROCEDURE — 25000003 PHARM REV CODE 250: Performed by: EMERGENCY MEDICINE

## 2025-07-06 PROCEDURE — 99283 EMERGENCY DEPT VISIT LOW MDM: CPT | Mod: 25

## 2025-07-06 PROCEDURE — 93005 ELECTROCARDIOGRAM TRACING: CPT

## 2025-07-06 RX ORDER — ASPIRIN 325 MG
325 TABLET ORAL
Status: COMPLETED | OUTPATIENT
Start: 2025-07-06 | End: 2025-07-06

## 2025-07-06 RX ADMIN — ASPIRIN 325 MG: 325 TABLET ORAL at 08:07
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
## 2025-07-07 LAB
HOLD SPECIMEN: NORMAL
OHS QRS DURATION: 80 MS
OHS QTC CALCULATION: 463 MS

## 2025-07-07 NOTE — DISCHARGE INSTRUCTIONS
Your EKG did not show a heart attack today. Please stop using cocaine.  Please return to the ER if you experience severe chest pain, trouble breathing, or any other concerns.       Thank you for choosing [facility]!     Our goal in the Emergency Department is to always provide outstanding medical care. You may receive a survey by mail or e-mail in the next week regarding your experience today. We would greatly appreciate you completing and returning the survey. Your feedback provides us with a way to recognize our staff who provide very good care, and it helps us learn how to improve when your experience was below our aspiration of excellence.      It is important to remember that some problems are difficult to diagnose and may not be found during your first visit. Be sure to follow up with your primary care doctor and review any labs/imaging that was performed during your visit with them. If you do not have a primary care doctor, you may contact the one listed on your discharge paperwork, or you may also call the [facility] Appointment Desk at [phone] to schedule an appointment.     All medications may potentially have side effects and it is impossible to predict which medications may give you side effects. If you feel that you are having a negative effect of any medication you should immediately stop taking them and seek medical attention.  Do not drive or make any important decisions for 24 hours if you have received any pain medications, sedatives or mood altering drugs during your ER visit.    We appreciate you trusting us with your medical care. We will be happy to take care of you for all of your future medical needs. You may return to the ER at any time for any new/concerning symptoms, worsening condition, or failure to improve. We hope you feel better soon.     Sincerely,    Clovis Oquendo Jr., MD  Board-Certified Emergency Medicine Physician  [facility]

## 2025-07-12 RX ORDER — GABAPENTIN 800 MG/1
800 TABLET ORAL 3 TIMES DAILY
Qty: 90 TABLET | Refills: 0 | Status: CANCELLED | OUTPATIENT
Start: 2025-07-12

## 2025-07-17 ENCOUNTER — OFFICE VISIT (OUTPATIENT)
Dept: INFECTIOUS DISEASES | Facility: CLINIC | Age: 45
End: 2025-07-17
Payer: MEDICAID

## 2025-07-17 ENCOUNTER — INFUSION (OUTPATIENT)
Dept: INFECTIOUS DISEASES | Facility: HOSPITAL | Age: 45
End: 2025-07-17
Payer: MEDICAID

## 2025-07-17 VITALS
BODY MASS INDEX: 36 KG/M2 | HEIGHT: 65 IN | DIASTOLIC BLOOD PRESSURE: 66 MMHG | WEIGHT: 216.06 LBS | SYSTOLIC BLOOD PRESSURE: 106 MMHG | TEMPERATURE: 98 F | HEART RATE: 77 BPM

## 2025-07-17 VITALS
HEIGHT: 65 IN | WEIGHT: 214.5 LBS | TEMPERATURE: 99 F | DIASTOLIC BLOOD PRESSURE: 74 MMHG | BODY MASS INDEX: 35.74 KG/M2 | RESPIRATION RATE: 18 BRPM | OXYGEN SATURATION: 100 % | SYSTOLIC BLOOD PRESSURE: 122 MMHG | HEART RATE: 73 BPM

## 2025-07-17 DIAGNOSIS — B95.62 BACTEREMIA DUE TO METHICILLIN RESISTANT STAPHYLOCOCCUS AUREUS: Primary | ICD-10-CM

## 2025-07-17 DIAGNOSIS — A49.02 METHICILLIN RESISTANT STAPHYLOCOCCUS AUREUS INFECTION: ICD-10-CM

## 2025-07-17 DIAGNOSIS — L03.90 CELLULITIS, UNSPECIFIED CELLULITIS SITE: Primary | ICD-10-CM

## 2025-07-17 DIAGNOSIS — M00.062 STAPHYLOCOCCAL ARTHRITIS OF LEFT KNEE: ICD-10-CM

## 2025-07-17 DIAGNOSIS — M00.9 PYOGENIC ARTHRITIS OF RIGHT KNEE JOINT, DUE TO UNSPECIFIED ORGANISM: ICD-10-CM

## 2025-07-17 DIAGNOSIS — R78.81 BACTEREMIA DUE TO METHICILLIN RESISTANT STAPHYLOCOCCUS AUREUS: Primary | ICD-10-CM

## 2025-07-17 PROCEDURE — 63600175 PHARM REV CODE 636 W HCPCS: Mod: JZ,TB | Performed by: STUDENT IN AN ORGANIZED HEALTH CARE EDUCATION/TRAINING PROGRAM

## 2025-07-17 PROCEDURE — 99999 PR PBB SHADOW E&M-EST. PATIENT-LVL III: CPT | Mod: PBBFAC,,, | Performed by: STUDENT IN AN ORGANIZED HEALTH CARE EDUCATION/TRAINING PROGRAM

## 2025-07-17 PROCEDURE — 25000003 PHARM REV CODE 250: Performed by: STUDENT IN AN ORGANIZED HEALTH CARE EDUCATION/TRAINING PROGRAM

## 2025-07-17 PROCEDURE — 99213 OFFICE O/P EST LOW 20 MIN: CPT | Mod: PBBFAC | Performed by: STUDENT IN AN ORGANIZED HEALTH CARE EDUCATION/TRAINING PROGRAM

## 2025-07-17 RX ORDER — HEPARIN 100 UNIT/ML
500 SYRINGE INTRAVENOUS
OUTPATIENT
Start: 2025-07-24

## 2025-07-17 RX ORDER — ACETAMINOPHEN, ASPIRIN (NSAID), AND CAFFEINE 250; 250; 65 MG/1; MG/1; MG/1
TABLET, FILM COATED ORAL
COMMUNITY

## 2025-07-17 RX ADMIN — DALBAVANCIN 1000 MG: 500 INJECTION, POWDER, FOR SOLUTION INTRAVENOUS at 12:07
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
## 2025-07-17 NOTE — PROGRESS NOTES
Pt here for Dalvance dose 2/2.     Limited head-to-toe assessment due to privacy issues and visit reason though the opportunity was given for patient to express any concerns  Patient arrives for infusion of Dalvance as ordered by Nancy Gavin All benefits, risks, requirements, and alternatives should have been discussed with patient by ordering provider at the time the order was placed.

## 2025-07-22 ENCOUNTER — TELEPHONE (OUTPATIENT)
Dept: INFECTIOUS DISEASES | Facility: CLINIC | Age: 45
End: 2025-07-22
Payer: MEDICAID

## 2025-07-22 NOTE — TELEPHONE ENCOUNTER
Called pt and left vm about scheduling hep b vaccine.     ----- Message from Nancy Dalton MD sent at 7/22/2025  7:32 AM CDT -----  Can you call her and ask if she would like to schedule her hepatitis B vaccines?  ----- Message -----  From: Lab, Background User  Sent: 7/17/2025   2:21 PM CDT  To: Nancy Dalton MD

## 2025-08-05 ENCOUNTER — TELEPHONE (OUTPATIENT)
Dept: INFECTIOUS DISEASES | Facility: HOSPITAL | Age: 45
End: 2025-08-05
Payer: MEDICAID

## 2025-08-05 NOTE — TELEPHONE ENCOUNTER
Copied from CRM #[id number]. Topic: General Inquiry - Patient Advice  >> Aug 5, 2025 12:14 PM Ida wrote:  Patient would like to speak w/ you regarding Infusion Treatments    Please call  Phone [phone]  Thank You      Called patient regarding message received. No answer/lvm with call back number [phone]. Awaiting return call.

## 2025-08-06 ENCOUNTER — PATIENT MESSAGE (OUTPATIENT)
Dept: INFECTIOUS DISEASES | Facility: CLINIC | Age: 45
End: 2025-08-06
Payer: MEDICAID

## 2025-08-06 DIAGNOSIS — M00.062 STAPHYLOCOCCAL ARTHRITIS OF LEFT KNEE: Primary | ICD-10-CM

## 2025-08-07 ENCOUNTER — TELEPHONE (OUTPATIENT)
Dept: HEPATOLOGY | Facility: CLINIC | Age: 45
End: 2025-08-07
Payer: MEDICAID

## 2025-08-07 NOTE — TELEPHONE ENCOUNTER
I returned the patient's call there was no answer and I left a voice message.  Patient was discharged on 7/2/25 and script is now over a month old.  Patient needs to follow up with her outpatient provider for a new script.

## 2025-08-16 ENCOUNTER — HOSPITAL ENCOUNTER (EMERGENCY)
Facility: OTHER | Age: 45
Discharge: HOME | End: 2025-08-16
Attending: EMERGENCY MEDICINE
Payer: MEDICAID

## 2025-08-16 VITALS
RESPIRATION RATE: 18 BRPM | HEART RATE: 88 BPM | TEMPERATURE: 99 F | WEIGHT: 206 LBS | SYSTOLIC BLOOD PRESSURE: 148 MMHG | BODY MASS INDEX: 34.32 KG/M2 | HEIGHT: 65 IN | DIASTOLIC BLOOD PRESSURE: 92 MMHG

## 2025-08-16 DIAGNOSIS — L03.90 CELLULITIS, UNSPECIFIED CELLULITIS SITE: Primary | ICD-10-CM

## 2025-08-16 DIAGNOSIS — M79.604 RIGHT LEG PAIN: ICD-10-CM

## 2025-08-16 LAB
ABSOLUTE EOSINOPHIL (OHS): 0.17 K/UL
ABSOLUTE MONOCYTE (OHS): 0.49 K/UL (ref 0.3–1)
ABSOLUTE NEUTROPHIL COUNT (OHS): 2.23 K/UL (ref 1.8–7.7)
ALBUMIN SERPL BCP-MCNC: 3.5 G/DL (ref 3.5–5.2)
ALP SERPL-CCNC: 126 UNIT/L (ref 40–150)
ALT SERPL W/O P-5'-P-CCNC: 17 UNIT/L (ref 10–44)
ANION GAP (OHS): 9 MMOL/L (ref 8–16)
AST SERPL-CCNC: 39 UNIT/L (ref 11–45)
B-HCG UR QL: NEGATIVE
BASOPHILS # BLD AUTO: 0.04 K/UL
BASOPHILS NFR BLD AUTO: 0.7 %
BILIRUB SERPL-MCNC: 0.3 MG/DL (ref 0.1–1)
BUN SERPL-MCNC: 10 MG/DL (ref 6–20)
CALCIUM SERPL-MCNC: 9.6 MG/DL (ref 8.7–10.5)
CHLORIDE SERPL-SCNC: 105 MMOL/L (ref 95–110)
CO2 SERPL-SCNC: 22 MMOL/L (ref 23–29)
CREAT SERPL-MCNC: 1 MG/DL (ref 0.5–1.4)
CTP QC/QA: YES
ERYTHROCYTE [DISTWIDTH] IN BLOOD BY AUTOMATED COUNT: 15 % (ref 11.5–14.5)
GFR SERPLBLD CREATININE-BSD FMLA CKD-EPI: >60 ML/MIN/1.73/M2
GLUCOSE SERPL-MCNC: 104 MG/DL (ref 70–110)
HCT VFR BLD AUTO: 33.4 % (ref 37–48.5)
HGB BLD-MCNC: 10.3 GM/DL (ref 12–16)
IMM GRANULOCYTES # BLD AUTO: 0.01 K/UL (ref 0–0.04)
IMM GRANULOCYTES NFR BLD AUTO: 0.2 % (ref 0–0.5)
LYMPHOCYTES # BLD AUTO: 3.11 K/UL (ref 1–4.8)
MCH RBC QN AUTO: 26.5 PG (ref 27–31)
MCHC RBC AUTO-ENTMCNC: 30.8 G/DL (ref 32–36)
MCV RBC AUTO: 86 FL (ref 82–98)
NUCLEATED RBC (/100WBC) (OHS): 0 /100 WBC
PLATELET # BLD AUTO: 385 K/UL (ref 150–450)
PMV BLD AUTO: 8 FL (ref 9.2–12.9)
POTASSIUM SERPL-SCNC: 4.2 MMOL/L (ref 3.5–5.1)
PROT SERPL-MCNC: 9.3 GM/DL (ref 6–8.4)
RBC # BLD AUTO: 3.89 M/UL (ref 4–5.4)
RELATIVE EOSINOPHIL (OHS): 2.8 %
RELATIVE LYMPHOCYTE (OHS): 51.4 % (ref 18–48)
RELATIVE MONOCYTE (OHS): 8.1 % (ref 4–15)
RELATIVE NEUTROPHIL (OHS): 36.8 % (ref 38–73)
SODIUM SERPL-SCNC: 136 MMOL/L (ref 136–145)
WBC # BLD AUTO: 6.05 K/UL (ref 3.9–12.7)

## 2025-08-16 PROCEDURE — 85025 COMPLETE CBC W/AUTO DIFF WBC: CPT | Performed by: EMERGENCY MEDICINE

## 2025-08-16 PROCEDURE — 99285 EMERGENCY DEPT VISIT HI MDM: CPT | Mod: 25

## 2025-08-16 PROCEDURE — 80053 COMPREHEN METABOLIC PANEL: CPT | Performed by: EMERGENCY MEDICINE

## 2025-08-16 PROCEDURE — 25000003 PHARM REV CODE 250: Performed by: EMERGENCY MEDICINE

## 2025-08-16 PROCEDURE — 81025 URINE PREGNANCY TEST: CPT | Performed by: EMERGENCY MEDICINE

## 2025-08-16 RX ORDER — MUPIROCIN 20 MG/G
OINTMENT TOPICAL 2 TIMES DAILY
Qty: 15 G | Refills: 0 | Status: SHIPPED | OUTPATIENT
Start: 2025-08-16 | End: 2025-08-30

## 2025-08-16 RX ORDER — SULFAMETHOXAZOLE AND TRIMETHOPRIM 800; 160 MG/1; MG/1
1 TABLET ORAL 2 TIMES DAILY
Qty: 20 TABLET | Refills: 0 | Status: SHIPPED | OUTPATIENT
Start: 2025-08-16 | End: 2025-08-26

## 2025-08-16 RX ORDER — SULFAMETHOXAZOLE AND TRIMETHOPRIM 800; 160 MG/1; MG/1
1 TABLET ORAL
Status: COMPLETED | OUTPATIENT
Start: 2025-08-16 | End: 2025-08-16

## 2025-08-16 RX ORDER — LIDOCAINE 50 MG/G
1 PATCH TOPICAL ONCE
Status: DISCONTINUED | OUTPATIENT
Start: 2025-08-16 | End: 2025-08-16 | Stop reason: HOSPADM

## 2025-08-16 RX ADMIN — SULFAMETHOXAZOLE AND TRIMETHOPRIM 1 TABLET: 800; 160 TABLET ORAL at 06:08

## (undated) DEVICE — SUT VICRYL 2-0 36 CT-1

## (undated) DEVICE — SUT 4.0 ETHILON

## (undated) DEVICE — WRAP COBAN NL STRL 6INX5YD

## (undated) DEVICE — DRESSING XEROFORM NONADH 1X8IN

## (undated) DEVICE — ALCOHOL ISOPROPYL BLU 70% 16OZ

## (undated) DEVICE — DRAPE U SPLIT SHEET 54X76IN

## (undated) DEVICE — SOCKINETTE IMPERVIOUS 12X48IN

## (undated) DEVICE — SPONGE COTTON TRAY 4X4IN

## (undated) DEVICE — NDL HYPO REG 25G X 1 1/2

## (undated) DEVICE — PULSAVAC ZIMMER

## (undated) DEVICE — GLOVE BIOGEL SKINSENSE PI 8.0

## (undated) DEVICE — SOL NACL IRR 3000ML

## (undated) DEVICE — UNDERGLOVES BIOGEL PI SIZE 8

## (undated) DEVICE — STAPLER SKIN ROTATING HEAD

## (undated) DEVICE — APPLICATOR CHLORAPREP ORN 26ML

## (undated) DEVICE — DRAPE EXTREMITY ORTHOMAX

## (undated) DEVICE — SUT VICRYL PLUS ANTIBACT

## (undated) DEVICE — UNDERGLOVES BIOGEL PI SIZE 8.5

## (undated) DEVICE — Device

## (undated) DEVICE — KIT EVACUATOR 3-SPRING 1/8 DRN

## (undated) DEVICE — UNDERPAD ULTRASORB 300LB 30X36

## (undated) DEVICE — GLOVE BIOGEL SKINSENSE PI 7.5